# Patient Record
Sex: MALE | Race: BLACK OR AFRICAN AMERICAN | Employment: UNEMPLOYED | URBAN - METROPOLITAN AREA
[De-identification: names, ages, dates, MRNs, and addresses within clinical notes are randomized per-mention and may not be internally consistent; named-entity substitution may affect disease eponyms.]

---

## 2018-02-13 ENCOUNTER — APPOINTMENT (OUTPATIENT)
Dept: ULTRASOUND IMAGING | Age: 63
DRG: 853 | End: 2018-02-13
Attending: EMERGENCY MEDICINE
Payer: COMMERCIAL

## 2018-02-13 ENCOUNTER — HOSPITAL ENCOUNTER (INPATIENT)
Age: 63
LOS: 8 days | Discharge: NURSING, REHAB OR LONG-TERM CARE | DRG: 853 | End: 2018-02-21
Attending: EMERGENCY MEDICINE | Admitting: INTERNAL MEDICINE
Payer: COMMERCIAL

## 2018-02-13 ENCOUNTER — APPOINTMENT (OUTPATIENT)
Dept: GENERAL RADIOLOGY | Age: 63
DRG: 853 | End: 2018-02-13
Attending: EMERGENCY MEDICINE
Payer: COMMERCIAL

## 2018-02-13 ENCOUNTER — APPOINTMENT (OUTPATIENT)
Dept: ULTRASOUND IMAGING | Age: 63
DRG: 853 | End: 2018-02-13
Attending: INTERNAL MEDICINE
Payer: COMMERCIAL

## 2018-02-13 DIAGNOSIS — L03.116 CELLULITIS OF LEFT LEG: ICD-10-CM

## 2018-02-13 DIAGNOSIS — L02.416 ABSCESS OF LEFT LEG: ICD-10-CM

## 2018-02-13 DIAGNOSIS — M79.89 LEFT LEG SWELLING: Primary | ICD-10-CM

## 2018-02-13 DIAGNOSIS — R50.9 FEVER, UNSPECIFIED FEVER CAUSE: ICD-10-CM

## 2018-02-13 PROBLEM — D72.829 LEUKOCYTOSIS: Status: ACTIVE | Noted: 2018-02-13

## 2018-02-13 PROBLEM — E88.09 HYPOALBUMINEMIA: Status: ACTIVE | Noted: 2018-02-13

## 2018-02-13 PROBLEM — D75.839 THROMBOCYTOSIS: Status: ACTIVE | Noted: 2018-02-13

## 2018-02-13 PROBLEM — R74.8 ABNORMAL TRANSAMINASES: Status: ACTIVE | Noted: 2018-02-13

## 2018-02-13 PROBLEM — D64.9 ANEMIA: Status: ACTIVE | Noted: 2018-02-13

## 2018-02-13 LAB
ALBUMIN SERPL-MCNC: 2 G/DL (ref 3.2–4.6)
ALBUMIN/GLOB SERPL: 0.3 {RATIO} (ref 1.2–3.5)
ALP SERPL-CCNC: 212 U/L (ref 50–136)
ALT SERPL-CCNC: 141 U/L (ref 12–65)
ANION GAP SERPL CALC-SCNC: 8 MMOL/L (ref 7–16)
AST SERPL-CCNC: 174 U/L (ref 15–37)
ATRIAL RATE: 107 BPM
BASOPHILS # BLD: 0 K/UL (ref 0–0.2)
BASOPHILS NFR BLD: 0 % (ref 0–2)
BILIRUB SERPL-MCNC: 1.3 MG/DL (ref 0.2–1.1)
BNP SERPL-MCNC: 2 PG/ML
BUN SERPL-MCNC: 20 MG/DL (ref 8–23)
CALCIUM SERPL-MCNC: 9.7 MG/DL (ref 8.3–10.4)
CALCULATED P AXIS, ECG09: 65 DEGREES
CALCULATED R AXIS, ECG10: 49 DEGREES
CALCULATED T AXIS, ECG11: 54 DEGREES
CHLORIDE SERPL-SCNC: 102 MMOL/L (ref 98–107)
CO2 SERPL-SCNC: 27 MMOL/L (ref 21–32)
CREAT SERPL-MCNC: 1.17 MG/DL (ref 0.8–1.5)
DIAGNOSIS, 93000: NORMAL
DIFFERENTIAL METHOD BLD: ABNORMAL
EOSINOPHIL # BLD: 0.1 K/UL (ref 0–0.8)
EOSINOPHIL NFR BLD: 1 % (ref 0.5–7.8)
ERYTHROCYTE [DISTWIDTH] IN BLOOD BY AUTOMATED COUNT: 16 % (ref 11.9–14.6)
GLOBULIN SER CALC-MCNC: 6.1 G/DL (ref 2.3–3.5)
GLUCOSE SERPL-MCNC: 134 MG/DL (ref 65–100)
HCT VFR BLD AUTO: 27 % (ref 41.1–50.3)
HGB BLD-MCNC: 8.5 G/DL (ref 13.6–17.2)
IMM GRANULOCYTES # BLD: 0.2 K/UL (ref 0–0.5)
IMM GRANULOCYTES NFR BLD AUTO: 1 % (ref 0–5)
LYMPHOCYTES # BLD: 1.7 K/UL (ref 0.5–4.6)
LYMPHOCYTES NFR BLD: 13 % (ref 13–44)
MCH RBC QN AUTO: 24.7 PG (ref 26.1–32.9)
MCHC RBC AUTO-ENTMCNC: 31.5 G/DL (ref 31.4–35)
MCV RBC AUTO: 78.5 FL (ref 79.6–97.8)
MONOCYTES # BLD: 1.6 K/UL (ref 0.1–1.3)
MONOCYTES NFR BLD: 12 % (ref 4–12)
NEUTS SEG # BLD: 10 K/UL (ref 1.7–8.2)
NEUTS SEG NFR BLD: 73 % (ref 43–78)
P-R INTERVAL, ECG05: 164 MS
PLATELET # BLD AUTO: 743 K/UL (ref 150–450)
PMV BLD AUTO: 9.3 FL (ref 10.8–14.1)
POTASSIUM SERPL-SCNC: 4.3 MMOL/L (ref 3.5–5.1)
PROT SERPL-MCNC: 8.1 G/DL (ref 6.3–8.2)
Q-T INTERVAL, ECG07: 340 MS
QRS DURATION, ECG06: 80 MS
QTC CALCULATION (BEZET), ECG08: 453 MS
RBC # BLD AUTO: 3.44 M/UL (ref 4.23–5.67)
SODIUM SERPL-SCNC: 137 MMOL/L (ref 136–145)
TROPONIN I SERPL-MCNC: <0.04 NG/ML (ref 0.02–0.05)
VENTRICULAR RATE, ECG03: 107 BPM
WBC # BLD AUTO: 13.7 K/UL (ref 4.3–11.1)

## 2018-02-13 PROCEDURE — 65270000029 HC RM PRIVATE

## 2018-02-13 PROCEDURE — 96366 THER/PROPH/DIAG IV INF ADDON: CPT | Performed by: EMERGENCY MEDICINE

## 2018-02-13 PROCEDURE — 74011250637 HC RX REV CODE- 250/637: Performed by: INTERNAL MEDICINE

## 2018-02-13 PROCEDURE — 80053 COMPREHEN METABOLIC PANEL: CPT | Performed by: EMERGENCY MEDICINE

## 2018-02-13 PROCEDURE — 99283 EMERGENCY DEPT VISIT LOW MDM: CPT | Performed by: EMERGENCY MEDICINE

## 2018-02-13 PROCEDURE — 96367 TX/PROPH/DG ADDL SEQ IV INF: CPT | Performed by: EMERGENCY MEDICINE

## 2018-02-13 PROCEDURE — 87186 SC STD MICRODIL/AGAR DIL: CPT | Performed by: EMERGENCY MEDICINE

## 2018-02-13 PROCEDURE — 87641 MR-STAPH DNA AMP PROBE: CPT | Performed by: EMERGENCY MEDICINE

## 2018-02-13 PROCEDURE — 93971 EXTREMITY STUDY: CPT

## 2018-02-13 PROCEDURE — 87205 SMEAR GRAM STAIN: CPT | Performed by: EMERGENCY MEDICINE

## 2018-02-13 PROCEDURE — 83880 ASSAY OF NATRIURETIC PEPTIDE: CPT | Performed by: EMERGENCY MEDICINE

## 2018-02-13 PROCEDURE — 74011250636 HC RX REV CODE- 250/636: Performed by: INTERNAL MEDICINE

## 2018-02-13 PROCEDURE — 99218 HC RM OBSERVATION: CPT

## 2018-02-13 PROCEDURE — 87040 BLOOD CULTURE FOR BACTERIA: CPT | Performed by: EMERGENCY MEDICINE

## 2018-02-13 PROCEDURE — 74011000258 HC RX REV CODE- 258: Performed by: EMERGENCY MEDICINE

## 2018-02-13 PROCEDURE — 85025 COMPLETE CBC W/AUTO DIFF WBC: CPT | Performed by: EMERGENCY MEDICINE

## 2018-02-13 PROCEDURE — 71046 X-RAY EXAM CHEST 2 VIEWS: CPT

## 2018-02-13 PROCEDURE — 96372 THER/PROPH/DIAG INJ SC/IM: CPT | Performed by: EMERGENCY MEDICINE

## 2018-02-13 PROCEDURE — 84484 ASSAY OF TROPONIN QUANT: CPT | Performed by: EMERGENCY MEDICINE

## 2018-02-13 PROCEDURE — 96365 THER/PROPH/DIAG IV INF INIT: CPT | Performed by: EMERGENCY MEDICINE

## 2018-02-13 PROCEDURE — 93005 ELECTROCARDIOGRAM TRACING: CPT | Performed by: EMERGENCY MEDICINE

## 2018-02-13 PROCEDURE — 99284 EMERGENCY DEPT VISIT MOD MDM: CPT | Performed by: EMERGENCY MEDICINE

## 2018-02-13 PROCEDURE — 74011250636 HC RX REV CODE- 250/636: Performed by: EMERGENCY MEDICINE

## 2018-02-13 PROCEDURE — 87077 CULTURE AEROBIC IDENTIFY: CPT | Performed by: EMERGENCY MEDICINE

## 2018-02-13 RX ORDER — ACETAMINOPHEN AND CODEINE PHOSPHATE 300; 30 MG/1; MG/1
1 TABLET ORAL
COMMUNITY

## 2018-02-13 RX ORDER — CYCLOBENZAPRINE HCL 5 MG
5 TABLET ORAL
COMMUNITY

## 2018-02-13 RX ORDER — ACETAMINOPHEN 500 MG
1000 TABLET ORAL
Status: DISCONTINUED | OUTPATIENT
Start: 2018-02-13 | End: 2018-02-13

## 2018-02-13 RX ORDER — GABAPENTIN 300 MG/1
300 CAPSULE ORAL 3 TIMES DAILY
COMMUNITY
End: 2018-03-07

## 2018-02-13 RX ORDER — HEPARIN SODIUM 5000 [USP'U]/ML
5000 INJECTION, SOLUTION INTRAVENOUS; SUBCUTANEOUS EVERY 8 HOURS
Status: DISCONTINUED | OUTPATIENT
Start: 2018-02-13 | End: 2018-02-21 | Stop reason: HOSPADM

## 2018-02-13 RX ORDER — TRAMADOL HYDROCHLORIDE 50 MG/1
50 TABLET ORAL
Status: DISCONTINUED | OUTPATIENT
Start: 2018-02-13 | End: 2018-02-21 | Stop reason: HOSPADM

## 2018-02-13 RX ORDER — VANCOMYCIN 2 GRAM/500 ML IN 0.9 % SODIUM CHLORIDE INTRAVENOUS
2000 EVERY 12 HOURS
Status: DISCONTINUED | OUTPATIENT
Start: 2018-02-13 | End: 2018-02-15 | Stop reason: DRUGHIGH

## 2018-02-13 RX ORDER — ACETAMINOPHEN 325 MG/1
650 TABLET ORAL
Status: DISCONTINUED | OUTPATIENT
Start: 2018-02-13 | End: 2018-02-21 | Stop reason: HOSPADM

## 2018-02-13 RX ADMIN — HEPARIN SODIUM 5000 UNITS: 5000 INJECTION, SOLUTION INTRAVENOUS; SUBCUTANEOUS at 20:11

## 2018-02-13 RX ADMIN — SODIUM CHLORIDE 1000 ML: 900 INJECTION, SOLUTION INTRAVENOUS at 16:27

## 2018-02-13 RX ADMIN — PIPERACILLIN SODIUM,TAZOBACTAM SODIUM 4.5 G: 4; .5 INJECTION, POWDER, FOR SOLUTION INTRAVENOUS at 16:28

## 2018-02-13 RX ADMIN — TRAMADOL HYDROCHLORIDE 50 MG: 50 TABLET, FILM COATED ORAL at 22:31

## 2018-02-13 RX ADMIN — VANCOMYCIN HYDROCHLORIDE 2000 MG: 10 INJECTION, POWDER, LYOPHILIZED, FOR SOLUTION INTRAVENOUS at 17:02

## 2018-02-13 NOTE — ED TRIAGE NOTES
Patient traveled by bus 12 hours on 2/8/2018 and started swelling to left leg on 2/9/2018. Patient advises pain is worse to knee area, denies any known trauma.

## 2018-02-13 NOTE — IP AVS SNAPSHOT
[place] 
 
 
 [id number] [facility] 
[phone] Patient: Briana Seymour MRN: [id number] VBD:6/36/1518 A check vince indicates which time of day the medication should be taken. My Medications ASK your doctor about these medications Instructions Each Dose to Equal  
 Morning Noon Evening Bedtime  
 cyclobenzaprine 5 mg tablet Commonly known as:  FLEXERIL Your last dose was: Your next dose is: Take 5 mg by mouth. 5 mg  
    
   
   
   
  
 gabapentin 300 mg capsule Commonly known as:  NEURONTIN Your last dose was: Your next dose is: Take 300 mg by mouth three (3) times daily. 300 mg  
    
   
   
   
  
 TYLENOL-CODEINE #3 300-30 mg per tablet Generic drug:  acetaminophen-codeine Your last dose was: Your next dose is: Take 1 Tab by mouth every four (4) hours as needed for Pain. 1 Tab

## 2018-02-13 NOTE — H&P
History and Physical    Patient: Herman Mast MRN: [id number]  SSN: [id number]    YOB: 1955  Age: 57 y.o. Sex: male      Subjective:   Cc: \" my left leg hurts\"    Herman Mast is a 57 y.o. male who has no significant PMH comes today after he experienced left leg pain over 1-2 weeks, that turned edematous 4 days ago. Of note, he lives in [place] and came to visit family members to [place], [place] on 2/9/18 via Bus. He denies trauma, falls, scratches over that leg. He never experienced episodes of cellulitis in the past. Denies fever, chills, general malaise, chest pain, cough, abdominal pain, diarrhea, dysuria, melena, GI bleed or any other symptoms. He has been fasting for 3 weeks due to Confucianist beliefs. He last visited his outside PCP on April/17, and he was told everything was normal.   Upon arrival to er vs /74    RR  16  T100.9F . Labs: wbc 13k, hb 8.5, plat 750k, normal bnp, negative troponin. Duplex of LLE was unremarkable. He was given vanc and zosyn iv. EKG: showed sinus tachycardia, qtc 453, cxr unremarkable. BC x 2 taken. Hospitalist was contacted for admission of this unfortunate patient with a diagnosis of LLE cellulitis. ROS: all pertinent findings described in my note    PMH: as above  Social Hx: non smoker, no alcohol, no drugs  Family hx: his father has ESRD on HD    History reviewed. No pertinent past medical history. Past Surgical History:   Procedure Laterality Date    HX ORTHOPAEDIC      back      History reviewed. No pertinent family history. Social History   Substance Use Topics    Smoking status: Never Smoker    Smokeless tobacco: Never Used    Alcohol use No      Prior to Admission medications    Medication Sig Start Date End Date Taking? Authorizing Provider   acetaminophen-codeine (TYLENOL-CODEINE #3) 300-30 mg per tablet Take 1 Tab by mouth every four (4) hours as needed for Pain.    Yes Phys Other, MD   cyclobenzaprine (FLEXERIL) 5 mg tablet Take 5 mg by mouth. Yes Sylvia Sifuentes MD   gabapentin (NEURONTIN) 300 mg capsule Take 300 mg by mouth three (3) times daily. Yes Sylvia Sifuentes MD        No Known Allergies    Review of Systems:  A comprehensive review of systems was negative except for that written in the History of Present Illness. Objective:     Vitals:    02/13/18 1335   BP: 128/74   Pulse: (!) 120   Resp: 16   Temp: (!) 100.9 °F (38.3 °C)   SpO2: 96%   Weight: 113.4 kg (250 lb)   Height: 5' 8\" (1.727 m)        Physical Exam:  GENERAL: alert, cooperative, no distress, appears stated age  EYE: negative  LYMPHATIC: Cervical, supraclavicular, and axillary nodes normal.   THROAT & NECK: normal and no erythema or exudates noted. LUNG: clear to auscultation bilaterally  HEART: regular rate and rhythm, S1, S2 normal, no murmur, click, rub or gallop  ABDOMEN: distended, soft, non-tender. Bowel sounds normal. No masses,  no organomegaly  EXTREMITIES:  LLE edematous, shiny, mild tender to touch, no skinbreakdown, no ulcers, mild erythema from below the knee to foot   SKIN: chronic skin changes, hyperpigmentation   NEUROLOGIC: negative  PSYCHIATRIC: non focal    Assessment:     Hospital Problems  Date Reviewed: 2/13/2018          Codes Class Noted POA    Leukocytosis ICD-10-CM: D72.829  ICD-9-CM: 288.60  2/13/2018 Yes        Hypoalbuminemia ICD-10-CM: E88.09  ICD-9-CM: 273.8  2/13/2018 Yes        Anemia ICD-10-CM: D64.9  ICD-9-CM: 285.9  2/13/2018 Yes        Left leg cellulitis ICD-10-CM: L03.116  ICD-9-CM: 682.6  2/13/2018 Yes        Abnormal transaminases ICD-10-CM: R74.8  ICD-9-CM: 790.4  2/13/2018 Yes        Thrombocytosis ([address] ICD-10-CM: D47.3  ICD-9-CM: 238.71  2/13/2018 Yes              Plan: 1. Non purulent- Left leg cellulitis: possible superimposed to venous insufficiency   2. Leukocytosis: multifactorial, possible reactive  3. Thrombocytosis: possible to stress / infection  4. Hypoalbuminemia: he has been fasting for around 3 weeks due to Yarsani beliefs / also liver disease to be ruled out in light of elevated lfts  5. Transaminitis and elevated alk phosphatase   6. Anemia: unknown if SEGUNDO / chronic anemia  7. Obesity    Plan:  -Admit under observation  -Continue vancomycin, stop zosyn  -pain control: tylenol, ultram  -iron studies for anemia w/u  -Liver US for hypoalbuminemia  -Send PT/INR, hepatitis panel in am  -Keep left leg elevated  -F/U blood cultures   -Labs in am  -DVT ppx: heparin, GCS over right leg    Full code    Estimated LOS < 2 MN  Estimated DC planning: home  Risk: moderate  Goals of care discussed with patient and family member present in the room     Signed By: Faiza Diaz MD     February 13, 2018

## 2018-02-13 NOTE — IP AVS SNAPSHOT
Summary of Care Report The Summary of Care report has been created to help improve care coordination. Users with access to [employer] or [employer] (Web-based application) may access additional patient information including the Discharge Summary. If you are not currently a [address] user and need more information, please call the number listed below in the [place] [address] section and ask to be connected with Medical Records. Facility Information Name Address Phone [address] [place] [address] [address] [place] [phone] Patient Information Patient Name Sex [place] Gutierrez ([id number]) Male 1955 Discharge Information Admitting Provider Service Area Unit Mariely Lorenz MD / [place]  Med Surg / [phone] Discharge Provider Discharge Date/Time Discharge Disposition Destination (none) 2018 (Pending) [facility] (none) Hospital Problems as of 2018  Reviewed: 2018  4:31 PM by Mariely Lorenz MD  
  
  
  
 Class Noted - Resolved Last Modified POA Active Problems Leukocytosis  2018 - Present 2018 by Mariely Lorenz MD Yes Entered by Mariely Lorenz MD  
  Hypoalbuminemia  2018 - Present 2018 by Mariely Lorenz MD Yes Entered by Mariely Lorenz MD  
  Anemia  2018 - Present 2018 by Mariely Lorenz MD Yes Entered by Mariely Lorenz MD  
  Left leg cellulitis  2018 - Present 2018 by Mariely Lorenz MD Yes Entered by Mariely Lorenz MD  
  Abnormal transaminases  2018 - Present 2018 by Mariely Lorenz MD Yes Entered by Mariely Lorenz MD  
  Thrombocytosis ([address]  2018 - Present 2018 by Mariely Lorenz MD Yes Entered by Mariely Lorenz MD  
  Cellulitis  2/15/2018 - Present 2/15/2018 by Mariely Lorenz MD Unknown Entered by Dilia Noonan MD  
  
Non-Hospital Problems as of 2/20/2018  Reviewed: 2/13/2018  4:31 PM by Dilia Noonan MD  
 None You are allergic to the following No active allergies Current Discharge Medication List  
  
ASK your doctor about these medications Dose & Instructions Dispensing Information Comments  
 cyclobenzaprine 5 mg tablet Commonly known as:  FLEXERIL Dose:  5 mg Take 5 mg by mouth. Refills:  0  
   
 gabapentin 300 mg capsule Commonly known as:  NEURONTIN Dose:  300 mg Take 300 mg by mouth three (3) times daily. Refills:  0  
   
 TYLENOL-CODEINE #3 300-30 mg per tablet Generic drug:  acetaminophen-codeine Dose:  1 Tab Take 1 Tab by mouth every four (4) hours as needed for Pain. Refills:  0 Current Immunizations Name Date  
 TB Skin Test (PPD) Intradermal  Deferred (), 2/18/2018 Surgery Information ID Date/Time Status Primary Surgeon All Procedures Location [id number] 2/17/2018 [facility] Petra Rodrigues MD INCISION AND DRAINAGE LOWER EXTREMITY [facility] MAIN OR    
 [id number] 2/21/2018 [address] [place] [address] Street, MD LEFT LEG  WOUND CLOSURE   ROOM 356 [facility] MAIN OR Follow-up Information Follow up With Details Comments Contact Info None   None (395) Patient stated that they have no PCP Discharge Instructions None Chart Review Routing History Recipient Method Report Sent By Mary Cervantes MD  
Fax: [phone] Phone: [phone] Fax IP Auto Routed MD Nidhi Slater 2/16/2018 10:16 AM 02/16/2018

## 2018-02-13 NOTE — IP AVS SNAPSHOT
[facility] [address] [facility] 
[phone] Patient: Colleen Bhatt MRN: [id number] EWQ:7/46/6771 About your hospitalization You were admitted on:  February 13, 2018 You last received care in the:  [address] [place] [address] You were discharged on:  February 20, 2018 Why you were hospitalized Your primary diagnosis was:  Not on File Your diagnoses also included:  Leukocytosis, Hypoalbuminemia, Anemia, Left Leg Cellulitis, Abnormal Transaminases, Thrombocytosis (Hcc), Cellulitis Follow-up Information Follow up With Details Comments Contact Info None   None (395) Patient stated that they have no PCP Your Scheduled Appointments Wednesday February 21, 2018 EXTREMITY IRRIGATION AND DEBRIDEMENT with Velma Bamberger, MD  
D SURGERY ([address] [place] [address]) [address] [place] [address] [facility]  
[phone] Discharge Orders None A check vince indicates which time of day the medication should be taken. My Medications ASK your doctor about these medications Instructions Each Dose to Equal  
 Morning Noon Evening Bedtime  
 cyclobenzaprine 5 mg tablet Commonly known as:  FLEXERIL Your last dose was: Your next dose is: Take 5 mg by mouth. 5 mg  
    
   
   
   
  
 gabapentin 300 mg capsule Commonly known as:  NEURONTIN Your last dose was: Your next dose is: Take 300 mg by mouth three (3) times daily. 300 mg  
    
   
   
   
  
 TYLENOL-CODEINE #3 300-30 mg per tablet Generic drug:  acetaminophen-codeine Your last dose was: Your next dose is: Take 1 Tab by mouth every four (4) hours as needed for Pain. 1 Tab Discharge Instructions None MyChart Announcement  We are excited to announce that we are making your provider's discharge notes available to you in [employer]. You will see these notes when they are completed and signed by the physician that discharged you from your recent hospital stay. If you have any questions or concerns about any information you see in [employer], please call the Health Information Department where you were seen or reach out to your Primary Care Provider for more information about your plan of care. Introducing [facility]! [facility] introduces [employer] patient portal. Now you can access parts of your medical record, email your doctor's office, and request medication refills online. 1. In your internet browser, go to [email or web address] 2. Click on the First Time User? Click Here link in the Sign In box. You will see the New Member Sign Up page. 3. Enter your [employer] Access Code exactly as it appears below. You will not need to use this code after youve completed the sign-up process. If you do not sign up before the expiration date, you must request a new code. · Man[employer] Access Code: [email or web address] Expires: 5/14/2018  1:31 PM 
 
4. Enter the last four digits of your Social Security Number (xxxx) and Date of Birth (mm/dd/yyyy) as indicated and click Submit. You will be taken to the next sign-up page. 5. Create a Man[employer] Systems ID. This will be your Man[employer] Systems login ID and cannot be changed, so think of one that is secure and easy to remember. 6. Create a [employer] password. You can change your password at any time. 7. Enter your Password Reset Question and Answer. This can be used at a later time if you forget your password. 8. Enter your e-mail address. You will receive e-mail notification when new information is available in [address] 9. Click Sign Up. You can now view and download portions of your medical record. 10. Click the Download Summary menu link to download a portable copy of your medical information. If you have questions, please visit the Frequently Asked Questions section of the MyChart website. Remember, MyChart is NOT to be used for urgent needs. For medical emergencies, dial 911. Now available from your iPhone and Android! Unresulted Labs-Please follow up with your PCP about these lab tests Order Current Status CULTURE, ANAEROBIC Preliminary result CULTURE, ANAEROBIC Preliminary result CULTURE, ANAEROBIC Preliminary result CULTURE, ANAEROBIC Preliminary result CULTURE, ANAEROBIC Preliminary result CULTURE, BLOOD Preliminary result CULTURE, BLOOD Preliminary result Providers Seen During Your Hospitalization Provider Specialty Primary office phone Nidhi Orozco MD Emergency Medicine [phone] Augusto Latif MD Internal Medicine [phone] Immunizations Administered for This Admission Name Date  
 TB Skin Test (PPD) Intradermal  Deferred (), 2/18/2018 Your Primary Care Physician (PCP) Primary Care Physician Office Phone Office Fax NONE ** None ** ** None ** You are allergic to the following No active allergies Recent Documentation Height Weight BMI Smoking Status 1.727 m 113.4 kg 38.01 kg/m2 Never Smoker Patient Belongings The following personal items are in your possession at time of discharge: 
  Dental Appliances: Other (comment) (crown glued in.)  Visual Aid: Glasses      Home Medications: None   Jewelry: Watch, With patient  Clothing: At bedside, Footwear, Jacket/Coat, Shirt, Socks, Pants, Undergarments    Other Valuables: None Discharge Instructions Attachments/References ECHOCARDIOGRAM: TRANSESOPHAGEAL: POST-OP (ENGLISH) Patient Handouts Transesophageal Echocardiogram: What to Expect at [facility] Your Recovery A transesophageal echocardiogram is a test to help your doctor look at the inside of your heart.  A small device called a transducer directs sound waves toward your heart. The sound waves make a picture of the heart's valves and chambers. Before the test, your throat was sprayed with medicine to numb it. You won't be able to eat or drink until the numbness wears off. Your throat may be sore for a few days. You may have had a sedative to help you relax. You may be unsteady after having sedation. It can take a few hours for the medicine's effects to wear off. Common side effects include nausea, vomiting, and feeling sleepy or tired. This care sheet gives you a general idea about how long it will take for you to recover. But each person recovers at a different pace. Follow the steps below to feel better as quickly as possible. How can you care for yourself at home? Activity ? · If a sedative was used, your doctor will tell you when it is safe for you to do your normal activities. ? · For your safety, do not drive or operate any machinery that could be dangerous. Wait until the medicine wears off and you can think clearly and react easily. Diet ? · You can eat your normal diet. Follow-up care is a key part of your treatment and safety. Be sure to make and go to all appointments, and call your doctor if you are having problems. It's also a good idea to know your test results and keep a list of the medicines you take. When should you call for help? Watch closely for changes in your health, and be sure to contact your doctor if you have any problems. Where can you learn more? Go to http://rufus-caitlyn.info/. Enter M068 in the search box to learn more about \"Transesophageal Echocardiogram: What to Expect at Home. \" Current as of: September 21, 2016 Content Version: 11.4 © 3224-6989 [employer]. Care instructions adapted under license by [employer] (which disclaims liability or warranty for this information).  If you have questions about a medical condition or this instruction, always ask your healthcare professional. [address] any warranty or liability for your use of this information. Please provide this summary of care documentation to your next provider. Signatures-by signing, you are acknowledging that this After Visit Summary has been reviewed with you and you have received a copy. Patient Signature:  ____________________________________________________________ Date:  ____________________________________________________________  
  
Niesha Goodricher Provider Signature:  ____________________________________________________________ Date:  ____________________________________________________________

## 2018-02-14 ENCOUNTER — APPOINTMENT (OUTPATIENT)
Dept: ULTRASOUND IMAGING | Age: 63
DRG: 853 | End: 2018-02-14
Attending: INTERNAL MEDICINE
Payer: COMMERCIAL

## 2018-02-14 LAB
ALBUMIN SERPL-MCNC: 1.7 G/DL (ref 3.2–4.6)
ALBUMIN/GLOB SERPL: 0.3 {RATIO} (ref 1.2–3.5)
ALP SERPL-CCNC: 198 U/L (ref 50–136)
ALT SERPL-CCNC: 133 U/L (ref 12–65)
ANION GAP SERPL CALC-SCNC: 9 MMOL/L (ref 7–16)
AST SERPL-CCNC: 133 U/L (ref 15–37)
BILIRUB DIRECT SERPL-MCNC: 0.5 MG/DL
BILIRUB SERPL-MCNC: 0.9 MG/DL (ref 0.2–1.1)
BUN SERPL-MCNC: 15 MG/DL (ref 8–23)
CALCIUM SERPL-MCNC: 9.2 MG/DL (ref 8.3–10.4)
CHLORIDE SERPL-SCNC: 102 MMOL/L (ref 98–107)
CO2 SERPL-SCNC: 25 MMOL/L (ref 21–32)
CREAT SERPL-MCNC: 0.81 MG/DL (ref 0.8–1.5)
FERRITIN SERPL-MCNC: 501 NG/ML (ref 8–388)
GLOBULIN SER CALC-MCNC: 5.1 G/DL (ref 2.3–3.5)
GLUCOSE SERPL-MCNC: 99 MG/DL (ref 65–100)
INR PPP: 1.2
IRON SATN MFR SERPL: 10 %
IRON SERPL-MCNC: 14 UG/DL (ref 35–150)
POTASSIUM SERPL-SCNC: 4.5 MMOL/L (ref 3.5–5.1)
PROT SERPL-MCNC: 6.8 G/DL (ref 6.3–8.2)
PROTHROMBIN TIME: 15.6 SEC (ref 11.5–14.5)
SODIUM SERPL-SCNC: 136 MMOL/L (ref 136–145)
TIBC SERPL-MCNC: 138 UG/DL (ref 250–450)

## 2018-02-14 PROCEDURE — 85610 PROTHROMBIN TIME: CPT | Performed by: INTERNAL MEDICINE

## 2018-02-14 PROCEDURE — 82728 ASSAY OF FERRITIN: CPT | Performed by: INTERNAL MEDICINE

## 2018-02-14 PROCEDURE — 76705 ECHO EXAM OF ABDOMEN: CPT

## 2018-02-14 PROCEDURE — 85025 COMPLETE CBC W/AUTO DIFF WBC: CPT | Performed by: INTERNAL MEDICINE

## 2018-02-14 PROCEDURE — 74011250637 HC RX REV CODE- 250/637: Performed by: INTERNAL MEDICINE

## 2018-02-14 PROCEDURE — 93306 TTE W/DOPPLER COMPLETE: CPT

## 2018-02-14 PROCEDURE — 36415 COLL VENOUS BLD VENIPUNCTURE: CPT | Performed by: INTERNAL MEDICINE

## 2018-02-14 PROCEDURE — 87205 SMEAR GRAM STAIN: CPT | Performed by: INTERNAL MEDICINE

## 2018-02-14 PROCEDURE — P9045 ALBUMIN (HUMAN), 5%, 250 ML: HCPCS | Performed by: INTERNAL MEDICINE

## 2018-02-14 PROCEDURE — 99218 HC RM OBSERVATION: CPT

## 2018-02-14 PROCEDURE — 80074 ACUTE HEPATITIS PANEL: CPT | Performed by: INTERNAL MEDICINE

## 2018-02-14 PROCEDURE — 74011250636 HC RX REV CODE- 250/636: Performed by: INTERNAL MEDICINE

## 2018-02-14 PROCEDURE — 87040 BLOOD CULTURE FOR BACTERIA: CPT | Performed by: INTERNAL MEDICINE

## 2018-02-14 PROCEDURE — 80048 BASIC METABOLIC PNL TOTAL CA: CPT | Performed by: INTERNAL MEDICINE

## 2018-02-14 PROCEDURE — 83540 ASSAY OF IRON: CPT | Performed by: INTERNAL MEDICINE

## 2018-02-14 PROCEDURE — 80076 HEPATIC FUNCTION PANEL: CPT | Performed by: INTERNAL MEDICINE

## 2018-02-14 PROCEDURE — 74011250636 HC RX REV CODE- 250/636: Performed by: EMERGENCY MEDICINE

## 2018-02-14 PROCEDURE — 65270000029 HC RM PRIVATE

## 2018-02-14 RX ORDER — HYDROCODONE BITARTRATE AND ACETAMINOPHEN 10; 325 MG/1; MG/1
1 TABLET ORAL ONCE
Status: COMPLETED | OUTPATIENT
Start: 2018-02-14 | End: 2018-02-14

## 2018-02-14 RX ORDER — FUROSEMIDE 10 MG/ML
20 INJECTION INTRAMUSCULAR; INTRAVENOUS DAILY
Status: DISCONTINUED | OUTPATIENT
Start: 2018-02-14 | End: 2018-02-16

## 2018-02-14 RX ORDER — ALBUMIN HUMAN 50 G/1000ML
25 SOLUTION INTRAVENOUS EVERY 12 HOURS
Status: COMPLETED | OUTPATIENT
Start: 2018-02-14 | End: 2018-02-17

## 2018-02-14 RX ORDER — CYCLOBENZAPRINE HCL 10 MG
10 TABLET ORAL ONCE
Status: COMPLETED | OUTPATIENT
Start: 2018-02-14 | End: 2018-02-14

## 2018-02-14 RX ORDER — SODIUM CHLORIDE 9 MG/ML
100 INJECTION, SOLUTION INTRAVENOUS ONCE
Status: COMPLETED | OUTPATIENT
Start: 2018-02-14 | End: 2018-02-17

## 2018-02-14 RX ADMIN — ALBUMIN (HUMAN) 25 G: 12.5 INJECTION, SOLUTION INTRAVENOUS at 23:52

## 2018-02-14 RX ADMIN — VANCOMYCIN HYDROCHLORIDE 2000 MG: 10 INJECTION, POWDER, LYOPHILIZED, FOR SOLUTION INTRAVENOUS at 16:18

## 2018-02-14 RX ADMIN — HEPARIN SODIUM 5000 UNITS: 5000 INJECTION, SOLUTION INTRAVENOUS; SUBCUTANEOUS at 18:00

## 2018-02-14 RX ADMIN — HEPARIN SODIUM 5000 UNITS: 5000 INJECTION, SOLUTION INTRAVENOUS; SUBCUTANEOUS at 12:04

## 2018-02-14 RX ADMIN — TRAMADOL HYDROCHLORIDE 50 MG: 50 TABLET, FILM COATED ORAL at 12:04

## 2018-02-14 RX ADMIN — VANCOMYCIN HYDROCHLORIDE 2000 MG: 10 INJECTION, POWDER, LYOPHILIZED, FOR SOLUTION INTRAVENOUS at 04:23

## 2018-02-14 RX ADMIN — FUROSEMIDE 20 MG: 10 INJECTION, SOLUTION INTRAMUSCULAR; INTRAVENOUS at 12:05

## 2018-02-14 RX ADMIN — ALBUMIN (HUMAN) 25 G: 12.5 INJECTION, SOLUTION INTRAVENOUS at 12:39

## 2018-02-14 RX ADMIN — HEPARIN SODIUM 5000 UNITS: 5000 INJECTION, SOLUTION INTRAVENOUS; SUBCUTANEOUS at 04:23

## 2018-02-14 RX ADMIN — TRAMADOL HYDROCHLORIDE 50 MG: 50 TABLET, FILM COATED ORAL at 04:23

## 2018-02-14 RX ADMIN — TRAMADOL HYDROCHLORIDE 50 MG: 50 TABLET, FILM COATED ORAL at 18:00

## 2018-02-14 RX ADMIN — Medication: at 12:39

## 2018-02-14 RX ADMIN — ACETAMINOPHEN 650 MG: 325 TABLET ORAL at 08:47

## 2018-02-14 RX ADMIN — ACETAMINOPHEN 650 MG: 325 TABLET ORAL at 00:16

## 2018-02-14 RX ADMIN — CYCLOBENZAPRINE HYDROCHLORIDE 10 MG: 10 TABLET, FILM COATED ORAL at 00:16

## 2018-02-14 RX ADMIN — HYDROCODONE BITARTRATE AND ACETAMINOPHEN 1 TABLET: 10; 325 TABLET ORAL at 00:16

## 2018-02-14 NOTE — PROGRESS NOTES
Breanna Sherman is a 57 y.o M admitted for LLE Cellulitis. Arrived via stretcher with daughter and sister at bedside. Pt stated he was traveling from [place] for a visit here. Pt stated BLE swelling began last Friday, but since then has worsened. Patient is A&Ox4. Able to verbalize needs. Resting quietly with no distress noted. Admission assessment completed. Neurovascular and peripheral vascular checks WNL. LLE elevated on 3 pillows. Voiding clear yellow urine via urinal. Ambulates with 1 assist. PIV capped and flushed without difficulty. Dressing to PIV intact with no S/S of infection. Requesting stronger pain meds. Will page hosp about request. Bed low and locked. Call light within reach. Instructed to call for assistance. Patient verbalizes understanding. Will continue to monitor.

## 2018-02-14 NOTE — PROGRESS NOTES
Pharmacokinetic Consult to Pharmacist    Joce Joshua is a 57 y.o. male being treated for cellulitis with Vancomycin. Height: 5' 8\" (172.7 cm)  Weight: 113.4 kg (250 lb)  Lab Results   Component Value Date/Time    BUN 15 02/14/2018 07:07 AM    Creatinine 0.81 02/14/2018 07:07 AM    WBC 14.6 (H) 02/14/2018 07:07 AM      Estimated Creatinine Clearance: 112.6 mL/min (based on Cr of 0.81). CULTURES:  All Micro Results     Procedure Component Value Units Date/Time    CULTURE, BLOOD [[id number]] Collected:  02/13/18 1626    Order Status:  Completed Specimen:  Blood from Blood Updated:  02/14/18 1037     Special Requests: --        RIGHT  Antecubital       GRAM STAIN GRAM POSITIVE COCCI                 AEROBIC AND ANAEROBIC BOTTLES              RESULTS VERIFIED, PHONED TO AND READ BACK BY [address] [place] [address] RN @3926 2/14/18 BY      Culture result:         CULTURE IN PROGRESS,FURTHER UPDATES TO FOLLOW    CULTURE, BLOOD [[id number]] Collected:  02/13/18 1638    Order Status:  Completed Specimen:  Blood from Blood Updated:  02/14/18 0733     Special Requests: --        LEFT  HAND       Culture result: NO GROWTH AFTER 14 HOURS               No results found for: [place]    Day 2 of vancomycin. Goal trough is 12 - 18. Will continue with 2 grams IV every 12 hours. Will continue to follow patient.       Thank you,  Jaye MillerD, BCPS

## 2018-02-14 NOTE — PROGRESS NOTES
TRANSFER - IN REPORT:    Verbal report received from Sarah Hidalgo on Keren Habermann  being received from ED for routine progression of care      Report consisted of patients Situation, Background, Assessment and   Recommendations(SBAR). Information from the following report(s) SBAR, ED Summary, Intake/Output and MAR was reviewed with the receiving nurse. Opportunity for questions and clarification was provided. Assessment completed upon patients arrival to unit and care assumed.

## 2018-02-14 NOTE — PROGRESS NOTES
Shift assessment complete, see flow sheet for full assessment. Pt alert and oriented x4, denies pain at this time. Respirations even and unlabored breath sounds clear bilaterally. S1 S2 auscultated, VSS. Skin warm, dry. +3 edema in LLE. Abdomen soft and non tender, bowel sounds active in all quadrants. Bed low and locked, call light within reach, pt advised to call if assistance is needed.

## 2018-02-14 NOTE — PROGRESS NOTES
Progress Note    Patient: Milan Garcia MRN: [id number]  SSN: [id number]    YOB: 1955  Age: 57 y.o. Sex: male      Admit Date: 2/13/2018    LOS: 0 days     Subjective:   Patient examined at bedside. He was found in no distress, stated feeling better. Had no fever, no vomiting, no abdominal pain nor diarrhea. Blood culture was positive for MRSA. Currently he remains on vancomycin. ECHO ordered. Awaiting complete results from blood cultures. ROS: all pertinent findings described in my note. Objective:     Vitals:    02/13/18 2130 02/13/18 2230 02/13/18 2302 02/14/18 0400   BP: 148/80 (!) 155/98 (!) 154/94 146/88   Pulse: (!) 112 (!) 113 (!) 110 (!) 107   Resp: 24 21 20 20   Temp:  99.2 °F (37.3 °C) 98.4 °F (36.9 °C) 96.1 °F (35.6 °C)   SpO2: 97% 99% 99% 98%   Weight:       Height:            Intake and Output:  Current Shift:    Last three shifts: 02/12 1901 - 02/14 0700  In: -   Out: 700 [Urine:700]    Physical Exam:   GENERAL: alert, cooperative, no distress, appears stated age  EYE: negative  LYMPHATIC: Cervical, supraclavicular, and axillary nodes normal.   THROAT & NECK: normal and no erythema or exudates noted. LUNG: clear to auscultation bilaterally  HEART: regular rate and rhythm, S1, S2 normal, no murmur, click, rub or gallop  ABDOMEN: soft, non-tender. Bowel sounds normal. No masses,  no organomegaly  EXTREMITIES:  LLE: edema, non tender, mild erythema, right leg: atraumatic, no cyanosis or edema  SKIN: Normal.  NEUROLOGIC: negative  Vascular: pulses present   PSYCHIATRIC: non focal    Lab/Data Review: All lab results for the last 24 hours reviewed. Assessment:     Active Problems:    Leukocytosis (2/13/2018)      Hypoalbuminemia (2/13/2018)      Anemia (2/13/2018)      Left leg cellulitis (2/13/2018)      Abnormal transaminases (2/13/2018)      Thrombocytosis ([address] (2/13/2018)        Plan: 1. Non purulent- Left leg cellulitis: possible superimposed to venous insufficiency: continue vancomycin- keep leg elevated - f/u blood cultures, so far growing MRSA on 1/2 bottle. Repeated cultures sent- ECHO    2.Leukocytosis: multifactorial, possible due to infection - monitor     3. Thrombocytosis: possible to stress / infection- monitor daily     4. Hypoalbuminemia: he has been fasting for around 3 weeks due to Christian beliefs / also liver disease to be ruled out in light of elevated lfts: start albumin iv and alternate with lasix for 2 days     5. Transaminitis and elevated alk phosphatase: stable, liver US was normal- f/u hepatitis panel     6. Anemia: unknown if SEGUNDO / chronic anemia: low iron stores- may need iron transfusion once his infection is clear     7. Obesity: counseling      -DVT ppx: heparin, GCS over right leg     Full code    Signed By: Nat Tuhrman MD     February 14, 2018

## 2018-02-14 NOTE — ED NOTES
Received bedside report from [address] [place] [address]. Left leg elevated on three pillows.

## 2018-02-14 NOTE — ED NOTES
TRANSFER - OUT REPORT:    Verbal report given to [facility]) on [facility]  being transferred to [id number]) for routine progression of care       Report consisted of patients Situation, Background, Assessment and   Recommendations(SBAR). Information from the following report(s) ED Summary was reviewed with the receiving nurse. Lines:       Opportunity for questions and clarification was provided.       Patient transported with:   Tech, family with pt

## 2018-02-14 NOTE — PROGRESS NOTES
Spoke with Dr. Abiola Li about pts request for more pain meds and received an order to give a one time dose of Norco 10mg and to let the morning rounding physician change meds.

## 2018-02-15 ENCOUNTER — APPOINTMENT (OUTPATIENT)
Dept: GENERAL RADIOLOGY | Age: 63
DRG: 853 | End: 2018-02-15
Attending: INTERNAL MEDICINE
Payer: COMMERCIAL

## 2018-02-15 PROBLEM — L03.90 CELLULITIS: Status: ACTIVE | Noted: 2018-02-15

## 2018-02-15 LAB
ANION GAP SERPL CALC-SCNC: 9 MMOL/L (ref 7–16)
BASOPHILS # BLD: 0 K/UL (ref 0–0.2)
BASOPHILS NFR BLD: 0 % (ref 0–2)
BUN SERPL-MCNC: 13 MG/DL (ref 8–23)
CALCIUM SERPL-MCNC: 9.3 MG/DL (ref 8.3–10.4)
CHLORIDE SERPL-SCNC: 100 MMOL/L (ref 98–107)
CO2 SERPL-SCNC: 27 MMOL/L (ref 21–32)
CREAT SERPL-MCNC: 0.89 MG/DL (ref 0.8–1.5)
DIFFERENTIAL METHOD BLD: ABNORMAL
DIFFERENTIAL METHOD BLD: ABNORMAL
EOSINOPHIL # BLD: 0.3 K/UL (ref 0–0.8)
EOSINOPHIL # BLD: 0.4 K/UL (ref 0–0.8)
EOSINOPHIL NFR BLD MANUAL: 3 % (ref 1–8)
EOSINOPHIL NFR BLD: 2 % (ref 0.5–7.8)
ERYTHROCYTE [DISTWIDTH] IN BLOOD BY AUTOMATED COUNT: 16.1 % (ref 11.9–14.6)
ERYTHROCYTE [DISTWIDTH] IN BLOOD BY AUTOMATED COUNT: 16.1 % (ref 11.9–14.6)
GLUCOSE SERPL-MCNC: 101 MG/DL (ref 65–100)
HAV IGM SERPL QL IA: NEGATIVE
HBV CORE IGM SERPL QL IA: NEGATIVE
HBV SURFACE AG SERPL QL IA: NEGATIVE
HCT VFR BLD AUTO: 24 % (ref 41.1–50.3)
HCT VFR BLD AUTO: 25.7 % (ref 41.1–50.3)
HCV AB S/CO SERPL IA: <0.1 S/CO RATIO (ref 0–0.9)
HGB BLD-MCNC: 7.8 G/DL (ref 13.6–17.2)
HGB BLD-MCNC: 8 G/DL (ref 13.6–17.2)
IMM GRANULOCYTES # BLD: 0.4 K/UL (ref 0–0.5)
IMM GRANULOCYTES NFR BLD AUTO: 3 % (ref 0–5)
LYMPHOCYTES # BLD: 2.3 K/UL (ref 0.5–4.6)
LYMPHOCYTES # BLD: 4.7 K/UL (ref 0.5–4.6)
LYMPHOCYTES NFR BLD MANUAL: 32 % (ref 16–44)
LYMPHOCYTES NFR BLD: 16 % (ref 13–44)
MCH RBC QN AUTO: 24.4 PG (ref 26.1–32.9)
MCH RBC QN AUTO: 25.3 PG (ref 26.1–32.9)
MCHC RBC AUTO-ENTMCNC: 31.1 G/DL (ref 31.4–35)
MCHC RBC AUTO-ENTMCNC: 32.5 G/DL (ref 31.4–35)
MCV RBC AUTO: 77.9 FL (ref 79.6–97.8)
MCV RBC AUTO: 78.4 FL (ref 79.6–97.8)
MONOCYTES # BLD: 0.6 K/UL (ref 0.1–1.3)
MONOCYTES # BLD: 1.8 K/UL (ref 0.1–1.3)
MONOCYTES NFR BLD MANUAL: 4 % (ref 3–9)
MONOCYTES NFR BLD: 12 % (ref 4–12)
NEUTS SEG # BLD: 8.9 K/UL (ref 1.7–8.2)
NEUTS SEG # BLD: 9.7 K/UL (ref 1.7–8.2)
NEUTS SEG NFR BLD MANUAL: 61 % (ref 47–75)
NEUTS SEG NFR BLD: 67 % (ref 43–78)
PLATELET # BLD AUTO: 687 K/UL (ref 150–450)
PLATELET # BLD AUTO: 727 K/UL (ref 150–450)
PLATELET COMMENTS,PCOM: ABNORMAL
PMV BLD AUTO: 9.3 FL (ref 10.8–14.1)
PMV BLD AUTO: 9.4 FL (ref 10.8–14.1)
POTASSIUM SERPL-SCNC: 3.7 MMOL/L (ref 3.5–5.1)
RBC # BLD AUTO: 3.08 M/UL (ref 4.23–5.67)
RBC # BLD AUTO: 3.28 M/UL (ref 4.23–5.67)
RBC MORPH BLD: ABNORMAL
SODIUM SERPL-SCNC: 136 MMOL/L (ref 136–145)
VANCOMYCIN TROUGH SERPL-MCNC: 10.6 UG/ML (ref 5–20)
WBC # BLD AUTO: 14.4 K/UL (ref 4.3–11.1)
WBC # BLD AUTO: 14.6 K/UL (ref 4.3–11.1)

## 2018-02-15 PROCEDURE — 36415 COLL VENOUS BLD VENIPUNCTURE: CPT | Performed by: INTERNAL MEDICINE

## 2018-02-15 PROCEDURE — 73560 X-RAY EXAM OF KNEE 1 OR 2: CPT

## 2018-02-15 PROCEDURE — 89060 EXAM SYNOVIAL FLUID CRYSTALS: CPT | Performed by: ORTHOPAEDIC SURGERY

## 2018-02-15 PROCEDURE — P9045 ALBUMIN (HUMAN), 5%, 250 ML: HCPCS | Performed by: INTERNAL MEDICINE

## 2018-02-15 PROCEDURE — 85025 COMPLETE CBC W/AUTO DIFF WBC: CPT | Performed by: INTERNAL MEDICINE

## 2018-02-15 PROCEDURE — 74011250636 HC RX REV CODE- 250/636: Performed by: INTERNAL MEDICINE

## 2018-02-15 PROCEDURE — 65270000029 HC RM PRIVATE

## 2018-02-15 PROCEDURE — 84560 ASSAY OF URINE/URIC ACID: CPT

## 2018-02-15 PROCEDURE — 80202 ASSAY OF VANCOMYCIN: CPT | Performed by: INTERNAL MEDICINE

## 2018-02-15 PROCEDURE — 80048 BASIC METABOLIC PNL TOTAL CA: CPT | Performed by: INTERNAL MEDICINE

## 2018-02-15 PROCEDURE — 74011250637 HC RX REV CODE- 250/637: Performed by: INTERNAL MEDICINE

## 2018-02-15 PROCEDURE — 74011250636 HC RX REV CODE- 250/636: Performed by: EMERGENCY MEDICINE

## 2018-02-15 RX ORDER — VANCOMYCIN HYDROCHLORIDE
1250 EVERY 8 HOURS
Status: DISCONTINUED | OUTPATIENT
Start: 2018-02-16 | End: 2018-02-17 | Stop reason: DRUGHIGH

## 2018-02-15 RX ADMIN — HEPARIN SODIUM 5000 UNITS: 5000 INJECTION, SOLUTION INTRAVENOUS; SUBCUTANEOUS at 17:42

## 2018-02-15 RX ADMIN — VANCOMYCIN HYDROCHLORIDE 2000 MG: 10 INJECTION, POWDER, LYOPHILIZED, FOR SOLUTION INTRAVENOUS at 04:03

## 2018-02-15 RX ADMIN — SODIUM CHLORIDE 100 ML/HR: 900 INJECTION, SOLUTION INTRAVENOUS at 02:14

## 2018-02-15 RX ADMIN — TRAMADOL HYDROCHLORIDE 50 MG: 50 TABLET, FILM COATED ORAL at 17:42

## 2018-02-15 RX ADMIN — HEPARIN SODIUM 5000 UNITS: 5000 INJECTION, SOLUTION INTRAVENOUS; SUBCUTANEOUS at 02:00

## 2018-02-15 RX ADMIN — TRAMADOL HYDROCHLORIDE 50 MG: 50 TABLET, FILM COATED ORAL at 08:38

## 2018-02-15 RX ADMIN — FUROSEMIDE 20 MG: 10 INJECTION, SOLUTION INTRAMUSCULAR; INTRAVENOUS at 13:13

## 2018-02-15 RX ADMIN — ACETAMINOPHEN 650 MG: 325 TABLET ORAL at 21:14

## 2018-02-15 RX ADMIN — ALBUMIN (HUMAN) 25 G: 12.5 INJECTION, SOLUTION INTRAVENOUS at 11:16

## 2018-02-15 RX ADMIN — TRAMADOL HYDROCHLORIDE 50 MG: 50 TABLET, FILM COATED ORAL at 02:01

## 2018-02-15 RX ADMIN — HEPARIN SODIUM 5000 UNITS: 5000 INJECTION, SOLUTION INTRAVENOUS; SUBCUTANEOUS at 11:16

## 2018-02-15 NOTE — PROGRESS NOTES
Shift assessment complete, see flow sheet for full assessment. Pt alert and oriented x4, denies pain at this time. Respirations even and unlabored breath sounds clear bilaterally. S1 S2 auscultated, VSS. Skin warm, dry. +3 edema in LLE. Abdomen soft and non tender, bowel sounds active in all quadrants.  Bed low and locked, call light within reach, pt advised to call if assistance is needed

## 2018-02-15 NOTE — PHYSICIAN ADVISORY
Letter of Determination: Upgrade from Observation to Inpatient Status    This patient was originally hospitalized as Outpatient Status with Observation Services on 2/13/2018 for left lower extremity cellulitis. This patient now meets for Inpatient Admission based on medical necessity. The patient's stay was medically necessary based on blood culture results positive for methicillin sensitive staphylococcus aureus in two bottles, and treatement with intravenous vancomycin 2000 mg every 12 hours, and piperacillin-tazobactam 4.5g. It is our recommendation that this patient's hospitalization status should be upgraded from OBSERVATION to INPATIENT status.      The final decision regarding the patient's hospitalization status depends on the attending physician's judgement.     Manju Atkins MD, TIFFANIE,   Physician [place].

## 2018-02-15 NOTE — CONSULTS
Infectious Disease Consult    Today's Date: 2/15/2018   Admit Date: 2/13/2018    Impression:   · MRSA bacteremia (2/13 - 2/14)  · Left knee pain / swelling concerning for possible septic knee  · Recent lumbar spine surgery with hardware, 10/2017 (in [place])  · Elevated LFTs - hep panel negative; ? NAFLD  · Microcytic anemia with elevated ferritin - likely Chronic disease anemia or inflammatory anemia  · Thrombocytosis    Plan:   · Recommend orthopedic consultation to perform arthrocentesis and consider I&D given his painful L knee. · Will order left knee plain films. · Continue IV vancomycin - target trough of 15-20. · He may need MANJINDER but defer for now. · He has no back pain, so will defer MRI of the spine for now. Anti-infectives:     Inpat Anti-Infectives     Start     Ordered Stop    02/15/18 1500  VANCOMYCIN INFORMATION NOTE  Other,   ONCE      02/14/18 1102 02/16/18 0259    02/13/18 1602  vancomycin (VANCOCIN) 2000 mg in  ml infusion  2,000 mg,   IntraVENous,   EVERY 12 HOURS      02/13/18 1602 --          Subjective:   Date of Consultation:  February 15, 2018  Referring Physician: Dr. Orrin Severance    Patient is a 57 y.o. male who presented to [facility] on 2/13 with left lower extremity pain which had worsened over 1-2 weeks prior. He had been doing PT in [place] where he lives related to a recent back surgery, and he started having pain in his leg. He then took a bus to [place] to visit family members here, but his left leg pain worsened and he came to the hospital.  On presentation, he was noted to have non-purulent left leg cellulitis and venous insufficiency, fever, and leukocytosis. He was placed on IV vancomycin. Admission blood cultures are positive for MRSA in 2/2 bottles, and repeat blood cultures from 2/14 are also positive. He continues to have leukocytosis, but his fevers are improved. MANJINDER from 2/14 shows no evidence of endocarditis. Admision CXR and LLE doppler are unremarkable.   Abdominal US was obtained to evaluate elevated LFTs and no strong evidence of cholecystitis is present. ID is consulted for further evaluation. In discussion with the patient, he is unable to move his left leg without significant pain. It is swollen and he has significant LLE edema. When we tried to lift his leg he had severe left knee pain. He denies back pain. Patient Active Problem List   Diagnosis Code    Leukocytosis D72.829    Hypoalbuminemia E88.09    Anemia D64.9    Left leg cellulitis L03. 116    Abnormal transaminases R74.8    Thrombocytosis (HCC) D47.3     History reviewed. No pertinent past medical history. History reviewed. No pertinent family history. Social History   Substance Use Topics    Smoking status: Never Smoker    Smokeless tobacco: Never Used    Alcohol use No     Past Surgical History:   Procedure Laterality Date    HX ORTHOPAEDIC      back      Prior to Admission medications    Medication Sig Start Date End Date Taking? Authorizing Provider   acetaminophen-codeine (TYLENOL-CODEINE #3) 300-30 mg per tablet Take 1 Tab by mouth every four (4) hours as needed for Pain. Yes Phys Other, MD   cyclobenzaprine (FLEXERIL) 5 mg tablet Take 5 mg by mouth. Yes Phys Other, MD   gabapentin (NEURONTIN) 300 mg capsule Take 300 mg by mouth three (3) times daily. Yes Phys Other, MD       No Known Allergies     Review of Systems:  A comprehensive review of systems was negative except for that written in the History of Present Illness. Objective:     Visit Vitals    /67 (BP 1 Location: Right arm, BP Patient Position: Post activity)    Pulse (!) 106    Temp 98.5 °F (36.9 °C)    Resp 18    Ht 5' 8\" (1.727 m)    Wt 113.4 kg (250 lb)    SpO2 97%    BMI 38.01 kg/m2     Temp (24hrs), Av.4 °F (37.4 °C), Min:98.2 °F (36.8 °C), Max:100.2 °F (37.9 °C)       Lines:  Peripheral IV:            Physical Exam:    General:  Alert, cooperative, in no acute distress   Eyes:  Sclera anicteric. Pupils equally round and reactive to light. Mouth/Throat: Mucous membranes normal, oral pharynx clear   Neck: Supple   Lungs:   Clear to auscultation bilaterally, good effort   CV:  Regular rate and rhythm,no murmur, click, rub or gallop   Abdomen:   Soft, non-tender. bowel sounds normal. non-distended   Extremities: No cyanosis; severe LLE edema   Skin: LLE venous stasis changes noted   Lymph nodes: Cervical and supraclavicular normal   Musculoskeletal: L knee is swollen with minimal ROM and severe pain   Lines/Devices:  Intact, no erythema, drainage or tenderness   Psych: Alert and oriented, normal mood affect given the setting       Data Review:     CBC:  Recent Labs      02/15/18   0441  02/14/18   0707  02/13/18   1533   WBC  14.4*  14.6*  13.7*   GRANS  67  61  73   MONOS  12  4  12   EOS  2  3  1   ANEU  9.7*  8.9*  10.0*   ABL  2.3  4.7*  1.7   HGB  7.8*  8.0*  8.5*   HCT  24.0*  25.7*  27.0*   PLT  687*  727*  743*       BMP:  Recent Labs      02/15/18   0441  02/14/18   0707  02/13/18   1533   CREA  0.89  0.81  1.17   BUN  13  15  20   NA  136  136  137   K  3.7  4.5  4.3   CL  100  102  102   CO2  27  25  27   AGAP  9  9  8   GLU  101*  99  134*       LFTS:  Recent Labs      02/14/18   0707  02/13/18   1533   TBILI  0.9  1.3*   ALT  133*  141*   SGOT  133*  174*   AP  198*  212*   TP  6.8  8.1   ALB  1.7*  2.0*       Microbiology:     All Micro Results     Procedure Component Value Units Date/Time    CULTURE, BLOOD [[id number]]  (Abnormal) Collected:  02/13/18 1638    Order Status:  Completed Specimen:  Blood from Blood Updated:  02/15/18 0808     Special Requests: --        LEFT  HAND       GRAM STAIN GRAM POSITIVE COCCI                 AEROBIC AND ANAEROBIC BOTTLES              CRITICAL RESULT NOT CALLED DUE TO PREVIOUS NOTIFICATION OF CRITICAL RESULT WITHIN THE LAST 24 HOURS. Culture result:         MRSA target DNA sequences detected:  SA target DNA sequence detected within the sample.  Test performed by PCR   Culture/Sensitivity to follow (A)            RESULTS VERIFIED, PHONED TO AND READ BACK BY  Debbi Robbins RN ON 2018 AT 2015 WMR        STAPHYLOCOCCUS AUREUS (A)                 CULTURE IN [place] UPDATES TO FOLLOW    CULTURE, BLOOD [[id number]]  (Abnormal) Collected:  18 1626    Order Status:  Completed Specimen:  Blood from Blood Updated:  02/15/18 0804     Special Requests: --        RIGHT  Antecubital       GRAM STAIN GRAM POSITIVE COCCI                 AEROBIC AND ANAEROBIC BOTTLES              RESULTS VERIFIED, PHONED TO AND READ BACK BY [facility] RN @3862 18 BY      Culture result:         STAPHYLOCOCCUS AUREUS IDENTIFICATION AND SUSCEPTIBILITY TO FOLLOW (A)              MRSA target DNA sequences detected:  SA target DNA sequence detected within the sample. Test performed by PCR   Culture/Sensitivity to follow (A)            RESULTS VERIFIED, PHONED TO AND READ BACK BY  ANGÉLICA RUTHERFORD AT 5056 ON 18 BY W      CULTURE, BLOOD [[id number]] Collected:  18 1205    Order Status:  Completed Specimen:  Blood from Blood Updated:  02/15/18 0720     Special Requests: --        NO SPECIAL REQUESTS  LEFT  HAND       GRAM STAIN         ANAEROBIC BOTTLE POSITIVE AEROBIC BOTTLE POSITIVE      GRAM POSITIVE COCCI                 RESULTS VERIFIED, PHONED TO AND READ BACK BY [facility] FLOOR [id number]. SBRAZEL     Culture result:         CULTURE IN PROGRESS,FURTHER UPDATES TO FOLLOW    CULTURE, BLOOD [[id number]] Collected:  18 1157    Order Status:  Completed Specimen:  Blood from Blood Updated:  02/15/18 0718     Special Requests: --        NO SPECIAL REQUESTS  LEFT  Antecubital       GRAM STAIN         GRAM POSITIVE COCCI AEROBIC BOTTLE POSITIVE              RESULTS VERIFIED, PHONED TO AND READ BACK BY [address] [facility] FLOOR [id number].  SBRAZEL     Culture result:         CULTURE IN PROGRESS,FURTHER UPDATES TO FOLLOW          Imagin18 TTE: AORTIC VALVE: The valve was probably trileaflet. There was no evidence for  stenosis. There was no insufficiency. MITRAL VALVE: Valve structure was normal. There was no evidence for stenosis. There was no regurgitation. TRICUSPID VALVE: The valve structure was normal. There was no evidence for  stenosis. There was trivial regurgitation. PULMONIC VALVE: Not well visualized. There was no evidence for stenosis. There  was mild regurgitation. PERICARDIUM: There was no pericardial effusion. AORTA: The root exhibited normal size. 2/14/18  ABD US: IMPRESSION:   1. Unremarkable ultrasound appearance of the liver.     2. No intra- or extrahepatic biliary ductal dilitation.     3.  Patent portal vein and IVC. Flow in the portal vein is hepatopedal in  direction.      4. Cholelithiasis and moderate gallbladder sludge. No strong indicators for  acute cholecystitis are seen although chronic gallbladder dysfunction cannot be  excluded. If further assessment is desired then this be best performed with a  nuclear medicine hepatobiliary scan. 2/13/18 CXR: IMPRESSION:  Minimal linear atelectasis or scarring lingula. No focal infiltrate  or pulmonary edema. 2/13/18 LLE Doppler: IMPRESSION  IMPRESSION:  No deep venous thrombosis identified.     Signed By: Dilma Moore MD     February 15, 2018

## 2018-02-15 NOTE — PROGRESS NOTES
Progress Note    Patient: Yash Felder MRN: [id number]  SSN: [id number]    YOB: 1955  Age: 57 y.o. Sex: male      Admit Date: 2/13/2018    LOS: 0 days     Subjective:   Patient examined at bedside. He was found in no distress. Denies fever, sob, cough, diarrhea. Blood cultures so far all positive for G+ cocci, 1 of them grew MRSA. Currently remains on vancomycin. ECHO with no evidence of vegetations. ID consulted. Patient stated he lives in [place] and would like to complete his antibiotic treatment there once blood culture results are complete. ROS: all pertinent findings described in my note. Objective:     Vitals:    02/14/18 1604 02/14/18 2042 02/14/18 2326 02/15/18 0509   BP:  136/78 138/80 133/79   Pulse:  (!) 120 (!) 117 (!) 111   Resp:  16  18   Temp: 99.7 °F (37.6 °C) 100.2 °F (37.9 °C) 99.9 °F (37.7 °C) 98.2 °F (36.8 °C)   SpO2:  95% 96% 96%   Weight:       Height:            Intake and Output:  Current Shift:    Last three shifts: 02/13 1901 - 02/15 0700  In: 489 [P.O.:300; I.V.:189]  Out: 1590 [Urine:1590]    Physical Exam:   GENERAL: alert, cooperative, no distress, appears stated age  EYE: negative  LYMPHATIC: Cervical, supraclavicular, and axillary nodes normal.   THROAT & NECK: normal and no erythema or exudates noted. LUNG: clear to auscultation bilaterally  HEART: regular rate and rhythm, S1, S2 normal, no murmur, click, rub or gallop  ABDOMEN: soft, non-tender. Bowel sounds normal. No masses,  no organomegaly  EXTREMITIES:  LLE: edema, tender, mild erythema, right leg: atraumatic, no cyanosis or edema  SKIN: Normal.  NEUROLOGIC: negative  Vascular: pulses present   PSYCHIATRIC: non focal    Lab/Data Review: All lab results for the last 24 hours reviewed.      Assessment:     Active Problems:    Leukocytosis (2/13/2018)      Hypoalbuminemia (2/13/2018)      Anemia (2/13/2018)      Left leg cellulitis (2/13/2018)      Abnormal transaminases (2/13/2018) Thrombocytosis ([facility] (2/13/2018)        Plan: 1. Sepsis due to Non purulent- Left leg cellulitis: possible superimposed to venous insufficiency/ MRSA bacteremia: multiple blood cultures positive for G+Cocci - ECHO negative for vegetations- continue vancomycin- keep leg elevated - f/u blood cultures- ID consult     2. Thrombocytosis: possible to stress / infection- monitor daily     3. Hypoalbuminemia: he has been fasting for around 3 weeks due to Druze beliefs / also liver disease to be ruled out in light of elevated lfts: s/p albumin iv total of 4 doses and  lasix     4. Transaminitis and elevated alk phosphatase: liver US was normal- hepatitis panel negative- likely due to bacteremia- monitor     5. Anemia: unknown if SEGUNDO / chronic anemia: low iron stores- may need iron transfusion once his infection is clear as outpatient     6. Obesity: counseling      -DVT ppx: heparin, GCS over right leg     Full code    *Patient status changed to inpatient. ROS, PMH, social hx and family hx unchanged from HPI done on 2/13/18.      Signed By: Bety Pierce MD     February 15, 2018

## 2018-02-15 NOTE — PROGRESS NOTES
Pharmacokinetic Consult to Pharmacist    Terra Gan is a 57 y.o. male being treated for bacteremia with vancomycin. Height: 5' 8\" (172.7 cm)  Weight: 113.4 kg (250 lb)  Lab Results   Component Value Date/Time    BUN 13 02/15/2018 04:41 AM    Creatinine 0.89 02/15/2018 04:41 AM    WBC 14.4 (H) 02/15/2018 04:41 AM      Estimated Creatinine Clearance: 102.5 mL/min (based on Cr of 0.89). CULTURES:  All Micro Results     Procedure Component Value Units Date/Time    CULTURE, BLOOD [[id number]] Collected:  02/14/18 1205    Order Status:  Completed Specimen:  Blood from Blood Updated:  02/15/18 1249     Special Requests: --        NO SPECIAL REQUESTS  LEFT  HAND       GRAM STAIN GRAM POSITIVE COCCI                 RESULTS VERIFIED, PHONED TO AND READ BACK BY Zaida Rios [facility] [id number]. SBRAZEL              AEROBIC AND ANAEROBIC BOTTLES     Culture result:         CULTURE IN PROGRESS,FURTHER UPDATES TO FOLLOW    CULTURE, BLOOD [[id number]]  (Abnormal) Collected:  02/13/18 1638    Order Status:  Completed Specimen:  Blood from Blood Updated:  02/15/18 0808     Special Requests: --        LEFT  HAND       GRAM STAIN GRAM POSITIVE COCCI                 AEROBIC AND ANAEROBIC BOTTLES              CRITICAL RESULT NOT CALLED DUE TO PREVIOUS NOTIFICATION OF CRITICAL RESULT WITHIN THE LAST 24 HOURS. Culture result:         MRSA target DNA sequences detected:  SA target DNA sequence detected within the sample.  Test performed by PCR   Culture/Sensitivity to follow (A)            RESULTS VERIFIED, PHONED TO AND READ BACK BY  Joe Cheney RN ON 02/14/2018 AT 2015 R        STAPHYLOCOCCUS AUREUS (A)                 [facility] UPDATES TO FOLLOW    CULTURE, BLOOD [[id number]]  (Abnormal) Collected:  02/13/18 1626    Order Status:  Completed Specimen:  Blood from Blood Updated:  02/15/18 0804     Special Requests: --        RIGHT  Antecubital       GRAM STAIN GRAM POSITIVE COCCI                 AEROBIC AND ANAEROBIC BOTTLES              RESULTS VERIFIED, PHONED TO AND READ BACK BY Woody GREENE RN @4379 2/14/18 BY      Culture result:         STAPHYLOCOCCUS AUREUS IDENTIFICATION AND SUSCEPTIBILITY TO FOLLOW (A)              MRSA target DNA sequences detected:  SA target DNA sequence detected within the sample. Test performed by PCR   Culture/Sensitivity to follow (A)            RESULTS VERIFIED, PHONED TO AND READ BACK BY  ANGÉLICA RUTHERFORD AT 7121 ON 2.14.18 BY W      CULTURE, BLOOD [[id number]] Collected:  02/14/18 1157    Order Status:  Completed Specimen:  Blood from Blood Updated:  02/15/18 0718     Special Requests: --        NO SPECIAL REQUESTS  LEFT  Antecubital       GRAM STAIN         GRAM POSITIVE COCCI AEROBIC BOTTLE POSITIVE              RESULTS VERIFIED, PHONED TO AND READ BACK BY [facility] [id number]. SBRAZEL     Culture result:         CULTURE IN PROGRESS,FURTHER UPDATES TO FOLLOW            Lab Results   Component Value Date/Time    Vancomycin,trough 10.6 02/15/2018 02:58 PM       Day 3 of vancomycin. Goal trough is 15-20.  2000mg dose is hanging currently. Will increase vancomycin to 1250mg q 8 hours starting tomorrow at 0100. Will continue to follow patient.       Thank you,  Tarry Eisenmenger, PharmD

## 2018-02-15 NOTE — PROGRESS NOTES
Spiritual Care visit. Initial visit.  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  affirmed his diverse experiences, and prayed for his recovery and health.     Visit by Sydney aYdav M.Ed., Th.B. ,Staff

## 2018-02-16 ENCOUNTER — APPOINTMENT (OUTPATIENT)
Dept: MRI IMAGING | Age: 63
DRG: 853 | End: 2018-02-16
Attending: INTERNAL MEDICINE
Payer: COMMERCIAL

## 2018-02-16 LAB
ABO + RH BLD: NORMAL
ALBUMIN SERPL-MCNC: 2.4 G/DL (ref 3.2–4.6)
ALBUMIN/GLOB SERPL: 0.5 {RATIO} (ref 1.2–3.5)
ALP SERPL-CCNC: 182 U/L (ref 50–136)
ALT SERPL-CCNC: 125 U/L (ref 12–65)
ANION GAP SERPL CALC-SCNC: 11 MMOL/L (ref 7–16)
AST SERPL-CCNC: 112 U/L (ref 15–37)
BACTERIA SPEC CULT: ABNORMAL
BILIRUB DIRECT SERPL-MCNC: 0.3 MG/DL
BILIRUB SERPL-MCNC: 0.8 MG/DL (ref 0.2–1.1)
BLOOD GROUP ANTIBODIES SERPL: NORMAL
BUN SERPL-MCNC: 11 MG/DL (ref 8–23)
CALCIUM SERPL-MCNC: 9.4 MG/DL (ref 8.3–10.4)
CHLORIDE SERPL-SCNC: 99 MMOL/L (ref 98–107)
CO2 SERPL-SCNC: 26 MMOL/L (ref 21–32)
CREAT SERPL-MCNC: 0.86 MG/DL (ref 0.8–1.5)
DIFFERENTIAL METHOD BLD: ABNORMAL
EOSINOPHIL # BLD: 0.2 K/UL (ref 0–0.8)
EOSINOPHIL NFR BLD MANUAL: 2 % (ref 1–8)
ERYTHROCYTE [DISTWIDTH] IN BLOOD BY AUTOMATED COUNT: 16.1 % (ref 11.9–14.6)
GLOBULIN SER CALC-MCNC: 5.3 G/DL (ref 2.3–3.5)
GLUCOSE SERPL-MCNC: 109 MG/DL (ref 65–100)
GRAM STN SPEC: ABNORMAL
HCT VFR BLD AUTO: 25.3 % (ref 41.1–50.3)
HGB BLD-MCNC: 8 G/DL (ref 13.6–17.2)
LYMPHOCYTES # BLD: 2.5 K/UL (ref 0.5–4.6)
LYMPHOCYTES NFR BLD MANUAL: 20 % (ref 16–44)
MCH RBC QN AUTO: 24.5 PG (ref 26.1–32.9)
MCHC RBC AUTO-ENTMCNC: 31.6 G/DL (ref 31.4–35)
MCV RBC AUTO: 77.6 FL (ref 79.6–97.8)
METAMYELOCYTES NFR BLD MANUAL: 3 %
MONOCYTES # BLD: 0.7 K/UL (ref 0.1–1.3)
MONOCYTES NFR BLD MANUAL: 6 % (ref 3–9)
NEUTS BAND NFR BLD MANUAL: 1 % (ref 0–6)
NEUTS SEG # BLD: 9 K/UL (ref 1.7–8.2)
NEUTS SEG NFR BLD MANUAL: 68 % (ref 47–75)
NRBC BLD-RTO: 2 PER 100 WBC
PLATELET # BLD AUTO: 711 K/UL (ref 150–450)
PLATELET COMMENTS,PCOM: ABNORMAL
PMV BLD AUTO: 9.1 FL (ref 10.8–14.1)
POTASSIUM SERPL-SCNC: 3.7 MMOL/L (ref 3.5–5.1)
PROT SERPL-MCNC: 7.7 G/DL (ref 6.3–8.2)
RBC # BLD AUTO: 3.26 M/UL (ref 4.23–5.67)
RBC MORPH BLD: ABNORMAL
SERVICE CMNT-IMP: ABNORMAL
SODIUM SERPL-SCNC: 136 MMOL/L (ref 136–145)
SPECIMEN EXP DATE BLD: NORMAL
WBC # BLD AUTO: 12.4 K/UL (ref 4.3–11.1)

## 2018-02-16 PROCEDURE — 86900 BLOOD TYPING SEROLOGIC ABO: CPT | Performed by: SURGERY

## 2018-02-16 PROCEDURE — 74011250637 HC RX REV CODE- 250/637: Performed by: INTERNAL MEDICINE

## 2018-02-16 PROCEDURE — A9577 INJ MULTIHANCE: HCPCS | Performed by: INTERNAL MEDICINE

## 2018-02-16 PROCEDURE — 87205 SMEAR GRAM STAIN: CPT | Performed by: INTERNAL MEDICINE

## 2018-02-16 PROCEDURE — 80076 HEPATIC FUNCTION PANEL: CPT | Performed by: INTERNAL MEDICINE

## 2018-02-16 PROCEDURE — 74011000258 HC RX REV CODE- 258: Performed by: INTERNAL MEDICINE

## 2018-02-16 PROCEDURE — P9045 ALBUMIN (HUMAN), 5%, 250 ML: HCPCS | Performed by: INTERNAL MEDICINE

## 2018-02-16 PROCEDURE — 74011250636 HC RX REV CODE- 250/636: Performed by: INTERNAL MEDICINE

## 2018-02-16 PROCEDURE — 85025 COMPLETE CBC W/AUTO DIFF WBC: CPT | Performed by: INTERNAL MEDICINE

## 2018-02-16 PROCEDURE — 87040 BLOOD CULTURE FOR BACTERIA: CPT | Performed by: INTERNAL MEDICINE

## 2018-02-16 PROCEDURE — 74011000250 HC RX REV CODE- 250: Performed by: ORTHOPAEDIC SURGERY

## 2018-02-16 PROCEDURE — 87186 SC STD MICRODIL/AGAR DIL: CPT | Performed by: INTERNAL MEDICINE

## 2018-02-16 PROCEDURE — 65270000029 HC RM PRIVATE

## 2018-02-16 PROCEDURE — 36415 COLL VENOUS BLD VENIPUNCTURE: CPT | Performed by: INTERNAL MEDICINE

## 2018-02-16 PROCEDURE — 87077 CULTURE AEROBIC IDENTIFY: CPT | Performed by: INTERNAL MEDICINE

## 2018-02-16 PROCEDURE — 87070 CULTURE OTHR SPECIMN AEROBIC: CPT | Performed by: INTERNAL MEDICINE

## 2018-02-16 PROCEDURE — 80048 BASIC METABOLIC PNL TOTAL CA: CPT | Performed by: INTERNAL MEDICINE

## 2018-02-16 PROCEDURE — 87075 CULTR BACTERIA EXCEPT BLOOD: CPT | Performed by: INTERNAL MEDICINE

## 2018-02-16 PROCEDURE — 73720 MRI LWR EXTREMITY W/O&W/DYE: CPT

## 2018-02-16 RX ORDER — HYDROMORPHONE HYDROCHLORIDE 2 MG/ML
0.5 INJECTION, SOLUTION INTRAMUSCULAR; INTRAVENOUS; SUBCUTANEOUS
Status: DISCONTINUED | OUTPATIENT
Start: 2018-02-16 | End: 2018-02-21 | Stop reason: HOSPADM

## 2018-02-16 RX ORDER — SODIUM CHLORIDE 9 MG/ML
250 INJECTION, SOLUTION INTRAVENOUS AS NEEDED
Status: DISCONTINUED | OUTPATIENT
Start: 2018-02-16 | End: 2018-02-21 | Stop reason: HOSPADM

## 2018-02-16 RX ORDER — LIDOCAINE HYDROCHLORIDE 10 MG/ML
10 INJECTION, SOLUTION EPIDURAL; INFILTRATION; INTRACAUDAL; PERINEURAL ONCE
Status: COMPLETED | OUTPATIENT
Start: 2018-02-16 | End: 2018-02-16

## 2018-02-16 RX ORDER — SODIUM CHLORIDE 0.9 % (FLUSH) 0.9 %
10 SYRINGE (ML) INJECTION
Status: COMPLETED | OUTPATIENT
Start: 2018-02-16 | End: 2018-02-16

## 2018-02-16 RX ORDER — LIDOCAINE HYDROCHLORIDE 10 MG/ML
10 INJECTION, SOLUTION EPIDURAL; INFILTRATION; INTRACAUDAL; PERINEURAL ONCE
Status: DISPENSED | OUTPATIENT
Start: 2018-02-16 | End: 2018-02-17

## 2018-02-16 RX ORDER — NALOXONE HYDROCHLORIDE 0.4 MG/ML
0.4 INJECTION, SOLUTION INTRAMUSCULAR; INTRAVENOUS; SUBCUTANEOUS
Status: DISCONTINUED | OUTPATIENT
Start: 2018-02-16 | End: 2018-02-21 | Stop reason: HOSPADM

## 2018-02-16 RX ADMIN — ALBUMIN (HUMAN) 25 G: 12.5 INJECTION, SOLUTION INTRAVENOUS at 00:45

## 2018-02-16 RX ADMIN — ACETAMINOPHEN 650 MG: 325 TABLET ORAL at 12:01

## 2018-02-16 RX ADMIN — TRAMADOL HYDROCHLORIDE 50 MG: 50 TABLET, FILM COATED ORAL at 17:54

## 2018-02-16 RX ADMIN — Medication 10 ML: at 16:35

## 2018-02-16 RX ADMIN — HEPARIN SODIUM 5000 UNITS: 5000 INJECTION, SOLUTION INTRAVENOUS; SUBCUTANEOUS at 11:42

## 2018-02-16 RX ADMIN — FUROSEMIDE 20 MG: 10 INJECTION, SOLUTION INTRAMUSCULAR; INTRAVENOUS at 12:25

## 2018-02-16 RX ADMIN — TRAMADOL HYDROCHLORIDE 50 MG: 50 TABLET, FILM COATED ORAL at 12:01

## 2018-02-16 RX ADMIN — VANCOMYCIN HYDROCHLORIDE 1250 MG: 10 INJECTION, POWDER, LYOPHILIZED, FOR SOLUTION INTRAVENOUS at 02:20

## 2018-02-16 RX ADMIN — GADOBENATE DIMEGLUMINE 10 ML: 529 INJECTION, SOLUTION INTRAVENOUS at 16:35

## 2018-02-16 RX ADMIN — VANCOMYCIN HYDROCHLORIDE 1250 MG: 10 INJECTION, POWDER, LYOPHILIZED, FOR SOLUTION INTRAVENOUS at 18:00

## 2018-02-16 RX ADMIN — TRAMADOL HYDROCHLORIDE 50 MG: 50 TABLET, FILM COATED ORAL at 02:37

## 2018-02-16 RX ADMIN — HEPARIN SODIUM 5000 UNITS: 5000 INJECTION, SOLUTION INTRAVENOUS; SUBCUTANEOUS at 18:47

## 2018-02-16 RX ADMIN — LIDOCAINE HYDROCHLORIDE 1 ML: 10 INJECTION, SOLUTION EPIDURAL; INFILTRATION; INTRACAUDAL; PERINEURAL at 20:00

## 2018-02-16 RX ADMIN — HEPARIN SODIUM 5000 UNITS: 5000 INJECTION, SOLUTION INTRAVENOUS; SUBCUTANEOUS at 02:37

## 2018-02-16 RX ADMIN — PIPERACILLIN SODIUM,TAZOBACTAM SODIUM 4.5 G: 4; .5 INJECTION, POWDER, FOR SOLUTION INTRAVENOUS at 21:25

## 2018-02-16 RX ADMIN — VANCOMYCIN HYDROCHLORIDE 1250 MG: 10 INJECTION, POWDER, LYOPHILIZED, FOR SOLUTION INTRAVENOUS at 11:42

## 2018-02-16 NOTE — PROGRESS NOTES
Tylenol 650 mg given at this time for c/o left knee pain that pt rated at 6/10. Will monitor effectiveness.

## 2018-02-16 NOTE — PROGRESS NOTES
Dual skin assessment completed with Joya Mendoza. No skin concerns noted. Patient has a band-aid to left knee from puncture for synovial fluids aspiration.

## 2018-02-16 NOTE — CONSULTS
[address]    Shelley Ashford  MR#: [id number]  : 1955  ACCOUNT #: [de-identified]   DATE OF SERVICE: 02/15/2018    CHIEF COMPLAINT:  Possible septic left knee. HISTORY OF PRESENT ILLNESS:  Mr. Kris Potts is a 13-year-old male who has been hospitalized at [address] [place] [address] since . The details of his hospitalization are well documented in his chart. he has sepsis, along with left lower extremity swelling. He reports that his left knee has never been a problem in the past.  He does not know of any serious injury to his left knee. Ten years ago, his right knee gave him some trouble, but that resolved uneventfully. He says neither hip has been a bother to him. He denies a history of gout. He reports that he has pain in his left knee, but a generalized discomfort in the entire left lower extremity also. He says that his upper extremity joints do not bother him. He had lumbar spine surgery last fall. PAST FAMILY/SOCIAL HISTORY:  He is recently . He grew up in [place] but has lived in [place] for the last 12 years. He is an . He has friends and family in the [place] area. REVIEW OF SYSTEMS:  Positive for lumbar spine disease, for which he has had surgery. He does not know of any preexisting gout, hypertension, diabetes, seizure disorder. FAMILY HISTORY:  Noncontributory. PHYSICAL EXAMINATION:   GENERAL:  The examination takes place with him supine in his bed. VITAL SIGNS:  He is afebrile. His pulse is recorded as 110 and blood pressure 116/62. Respirations are 18 and O2 sat is 96%. GENERAL:  He is alert, oriented and conversant. EXTREMITIES:  He feels slightly warm. His upper extremities show no restriction of motion, hot, painful or swollen joints.   His hips have diminished range bilaterally with each externally rotating about 30 degrees and internally rotating to neutral.  Neither hip is painful to him. His right knee has no effusion. His right lower extremity is not swollen. His entire left lower extremity is swollen with a diffuse edema. He has strong pedal pulses in both feet. He has a trace effusion in the left knee by palpation. There is no point tenderness about the left knee. Comfortable ROM is 0-80. RADIOGRAPHS:  Multiple x-rays of the left knee show severe osteoarthritis of all three compartments of the knee. There is bone-on-bone component to it. I do not clearly see an effusion on the x-ray. PROCEDURE NOTE:  After the patient granted permission to do the same, I prepped the skin with Betadine, anesthetized the medial parapatellar area of the left knee with 1 mL of 1% plain lidocaine and then I aspirated his knee with an 18 gauge needle obtaining approximately 5 mL of cloudy yellowish fluid with white specks in it. This has the appearance of gout. The fluid was placed on a purple top, red top, and culture tube and sent to the lab with appropriate orders. IMPRESSION:  1. Sepsis. 2.  Left lower extremity edema, multifactorial.  3.  Probable gouty inflammation of the left knee. 4. Severe osteoathritis of the left knee    DISCUSSION:  I do not think this is going to prove to be an infected left knee, but cultures will be of great help with that. I have discussed with him the possibility that if septic, it may warrant repeat aspiration or possibly surgery. He may continue to use the leg as he is comfortable doing so. MD Corazon Sánchez / Nannette Castleman  D: 02/15/2018 19:52     T: 02/16/2018 00:19  JOB #: [id number]  CC: Grant Cantu MD

## 2018-02-16 NOTE — PROGRESS NOTES
TRANSFER - IN REPORT:    Verbal report received from [facility] (name) on Clarisa Rodriguez  being received from Ortho (unit) for routine progression of care      Report consisted of patients Situation, Background, Assessment and   Recommendations(SBAR). Information from the following report(s) SBAR, Kardex, Intake/Output, MAR, Accordion, Recent Results and Med Rec Status was reviewed with the receiving nurse. Opportunity for questions and clarification was provided. Assessment completed upon patients arrival to unit and care assumed.

## 2018-02-16 NOTE — PROGRESS NOTES
Boyd Atkins has called the lab and they indicate that the only study they have done is synovial fluid analysis for crystals, and they indicate that this is \"being sent out\". That confuses me. The lab says they have the tubes and the fluid. We are going to try once again to obtain the studiess ordered. Lest anyone wonder, please read my \"nursing misc\" order of 8 pm Thurs . Each desired study is clearly described there. I don't know what else one can do . Today his knee does not have an effusion. His whole leg is swollen in a fashion I associate with low oncotic pressure of hypoalbuminemia. He is profoundly malnourished based on his labs and clinical history. Based on my clinical exam today I don not think he has a septic knee or an abcess in his left lower extremity.

## 2018-02-16 NOTE — PROGRESS NOTES
Full note dictated. I aspirated 5 cc of fluid from knee. It appears similar to gout fluid. Very little fluid in the knee.

## 2018-02-16 NOTE — PROGRESS NOTES
Am assessment completed. Pt is alert and oriented x 3, lungs clear, breathing non-labored. Bowel sounds + q 4 continent of bowel and bladder. Verbalizes needs well. Tolerating meals. Took shower this morning. Legs swollen left leg more that right. Left leg hot to touch. Ambulates very slowly and with a walker. Continue to monitor.

## 2018-02-16 NOTE — PROGRESS NOTES
The uric acid test was sent to [facility] by our lab on the knee for processing

## 2018-02-16 NOTE — PROGRESS NOTES
Ortho notified of MRI findings as was infectious disease. Dr. Tone Oglesby also talking with MD on call for ortho.

## 2018-02-16 NOTE — PROGRESS NOTES
Infectious Disease Progress Note    Today's Date: 2018   Admit Date: 2018    Impression:   · MRSA bacteremia ( - )  · Left knee pain / swelling concerning for possible septic knee  · Recent lumbar spine surgery with hardware, 10/2017 (in [place])  · Elevated LFTs - hep panel negative; ? NAFLD  · Microcytic anemia with elevated ferritin - likely Chronic disease anemia or inflammatory anemia  · Thrombocytosis    Plan:   · Continue IV vancomycin. · Repeat blood cultures today. · He may need MANJINDER but defer until next week. · He seems a bit confused so will need to keep an eye on him. · Unclear if cultures were sent from left knee - is this the miscellaneous test at [facility]? · Will get MRI of left leg to clarify what's going on. · Defer MRI of the spine - no back pain and he is able to flex hips without pain. Anti-infectives:   IV vancomycin    Subjective:   Date of Consultation:  2018  Referring Physician: Dr. Jabier Clarke    He was able to get a shower today; remains tachycardic; says he is worried about a fever but none recorded; also seemed unclear that he is already on IV antibiotics    No Known Allergies     Review of Systems:  Pertinent items are noted in the History of Present Illness.     Objective:     Visit Vitals    /81 (BP 1 Location: Left arm, BP Patient Position: At rest)    Pulse (!) 106    Temp 99.8 °F (37.7 °C)    Resp 18    Ht 5' 8\" (1.727 m)    Wt 113.4 kg (250 lb)    SpO2 100%    BMI 38.01 kg/m2     Temp (24hrs), Av.8 °F (37.1 °C), Min:96.9 °F (36.1 °C), Max:99.8 °F (37.7 °C)       Lines:  Peripheral IV:            Physical Exam:    General:  Alert, cooperative, in shower chair;   Eyes:  Sclera anicteric   Mouth/Throat: oral pharynx clear   Neck: Supple   Lungs:   Breathing comfortably   CV:  Tachycardic   Abdomen:   non-distended   Extremities: No cyanosis; severe LLE edema   Skin: LLE venous stasis changes noted   Lymph nodes:    Musculoskeletal: L knee is swollen with minimal ROM   Lines/Devices:  Intact, no erythema, drainage or tenderness   Psych: Alert and responsive; confused about some aspects of care;       Data Review:     CBC:  Recent Labs      02/16/18   0642  02/15/18   0441  02/14/18   0707   WBC  12.4*  14.4*  14.6*   GRANS  68  67  61   MONOS  6  12  4   EOS  2  2  3   ANEU  9.0*  9.7*  8.9*   ABL  2.5  2.3  4.7*   HGB  8.0*  7.8*  8.0*   HCT  25.3*  24.0*  25.7*   PLT  711*  687*  727*       BMP:  Recent Labs      02/16/18   0642  02/15/18   0441  02/14/18   0707   CREA  0.86  0.89  0.81   BUN  11  13  15   NA  136  136  136   K  3.7  3.7  4.5   CL  99  100  102   CO2  26  27  25   AGAP  11  9  9   GLU  109*  101*  99       LFTS:  Recent Labs      02/16/18   0642  02/14/18   0707  02/13/18   1533   TBILI  0.8  0.9  1.3*   ALT  125*  133*  141*   SGOT  112*  133*  174*   AP  182*  198*  212*   TP  7.7  6.8  8.1   ALB  2.4*  1.7*  2.0*       Microbiology:     All Micro Results     Procedure Component Value Units Date/Time    CULTURE, BLOOD [[id number]] Collected:  02/16/18 0852    Order Status:  Completed Specimen:  Blood from Blood Updated:  02/16/18 0857    CULTURE, BLOOD [[id number]] Collected:  02/16/18 0846    Order Status:  Completed Specimen:  Blood from Blood Updated:  02/16/18 0856    CULTURE, BLOOD [[id number]]  (Abnormal) Collected:  02/14/18 1157    Order Status:  Completed Specimen:  Blood from Blood Updated:  02/16/18 0725     Special Requests: --        NO SPECIAL REQUESTS  LEFT  Antecubital       GRAM STAIN         GRAM POSITIVE COCCI AEROBIC BOTTLE POSITIVE              RESULTS VERIFIED, PHONED TO AND READ BACK BY [address] [facility] FLOOR [id number].  SBRAZEL     Culture result: STAPHYLOCOCCUS AUREUS (A)         REFER TO [id number] FOR ID AND SUSCEPTIBILITY    CULTURE, BLOOD [[id number]]  (Abnormal) Collected:  02/14/18 1205    Order Status:  Completed Specimen:  Blood from Blood Updated:  02/16/18 0725     Special Requests: --        NO SPECIAL REQUESTS  LEFT  HAND       GRAM STAIN GRAM POSITIVE COCCI                 RESULTS VERIFIED, PHONED TO AND READ BACK BY [id number] [facility] [id number]. SBRAZEL              AEROBIC AND ANAEROBIC BOTTLES     Culture result: STAPHYLOCOCCUS AUREUS (A)         REFER TO [id number] FOR ID AND SUSCEPTIBILITY    CULTURE, BLOOD [[id number]]  (Abnormal) Collected:  18 1638    Order Status:  Completed Specimen:  Blood from Blood Updated:  18     Special Requests: --        LEFT  HAND       GRAM STAIN GRAM POSITIVE COCCI                 AEROBIC AND ANAEROBIC BOTTLES              CRITICAL RESULT NOT CALLED DUE TO PREVIOUS NOTIFICATION OF CRITICAL RESULT WITHIN THE LAST 24 HOURS. Culture result: STAPHYLOCOCCUS AUREUS (A)               RESULTS VERIFIED, PHONED TO AND READ BACK BY  Audrey Dominguez RN ON 2018 AT 2015 [facility]                MRSA target DNA sequences detected:  SA target DNA sequence detected within the sample. Test performed by PCR   Culture/Sensitivity to follow (A)      REFER TO [id number]    CULTURE, BLOOD [[id number]]  (Abnormal)  (Susceptibility) Collected:  18 1626    Order Status:  Completed Specimen:  Blood from Blood Updated:  18     Special Requests: --        RIGHT  Antecubital       GRAM STAIN GRAM POSITIVE COCCI                 AEROBIC AND ANAEROBIC BOTTLES              RESULTS VERIFIED, PHONED TO AND READ BACK BY [facility] RN @7862 18 BY      Culture result:         **METHICILLIN RESISTANT STAPHYLOCOCCUS AUREUS** Use Rifampin only in conjunction with another antimicrobial agent. Do not use as Monotherapy. (A)              MRSA target DNA sequences detected:  SA target DNA sequence detected within the sample. Test performed by PCR   Culture/Sensitivity to follow (A)            RESULTS VERIFIED, PHONED TO AND READ BACK BY  ANGÉLICA RUTHERFORD AT 1949 ON 18 BY             Imagin/15/18 L knee film: IMPRESSION:  1.  Large joint effusion and soft tissue edema without acute osseous abnormality. An infectious/inflammatory process of the left knee joint cannot be excluded  given the appearance. Recommend clinical correlation.     Signed By: Carmelita Perez MD     February 16, 2018

## 2018-02-16 NOTE — PROGRESS NOTES
Admission assessment completed via doc flow sheet. Patient is alert and oriented x 4. Respirations even and unlabored on room air. Lung sounds CTA bilaterally. Heart sounds S1, S2 auscultated and regular. Abdomen soft but obese. Bowel sounds active to all 4 quadrants. IV flushed  without difficulty. Patient uses personal walker to ambulate. Patient reported left knee pain that he rated at 6/10 at this time. Will medicate with tylenol. Bed is locked and in low position. Bed rails x 2. Patient is encouraged to call for assistance. Call light within reach.

## 2018-02-16 NOTE — PROGRESS NOTES
Progress Note    Patient: Sudheer Bernstein MRN: [id number]  SSN: [id number]    YOB: 1955  Age: 57 y.o. Sex: male      Admit Date: 2/13/2018    LOS: 3 days     Subjective:   Patient examined at bedside. He was found in no distress. MRI of the left leg showed multiloculated fluid collections over left leg and large effusion at left knee joint concerning for septic arthritis. I contacted orthopedic MD on call Dr Carolin Bustillos and case explained. He stated will come to evaluate patient. General surgery consult placed for I&D left leg. ROS: all pertinent findings described in my note. Objective:     Vitals:    02/15/18 1107 02/15/18 1531 02/15/18 2232 02/16/18 0401   BP: 133/71 116/62 133/84 138/73   Pulse: (!) 103 (!) 110 (!) 112 (!) 102   Resp: 18 18 20 18   Temp: 98.8 °F (37.1 °C) 99.7 °F (37.6 °C) 98.9 °F (37.2 °C) 96.9 °F (36.1 °C)   SpO2: 92% 96% 94% 96%   Weight:       Height:            Intake and Output:  Current Shift:    Last three shifts: 02/14 1901 - 02/16 0700  In: 489 [P.O.:300; I.V.:189]  Out: 1090 [Urine:1090]    Physical Exam:   GENERAL: alert, cooperative, no distress, appears stated age  EYE: negative  LYMPHATIC: Cervical, supraclavicular, and axillary nodes normal.   THROAT & NECK: normal and no erythema or exudates noted. LUNG: clear to auscultation bilaterally  HEART: regular rate and rhythm, S1, S2 normal, no murmur, click, rub or gallop  ABDOMEN: soft, non-tender. Bowel sounds normal. No masses,  no organomegaly  EXTREMITIES:  LLE: edema, tender, mild erythema, right leg: atraumatic, no cyanosis or edema  SKIN: Normal.  NEUROLOGIC: negative  Vascular: pulses present   PSYCHIATRIC: non focal    Lab/Data Review: All lab results for the last 24 hours reviewed.      Assessment:     Active Problems:    Leukocytosis (2/13/2018)      Hypoalbuminemia (2/13/2018)      Anemia (2/13/2018)      Left leg cellulitis (2/13/2018)      Abnormal transaminases (2/13/2018) Thrombocytosis ([facility] [address] (2/13/2018)      Cellulitis (2/15/2018)        Plan: 1. Sepsis of the Lef leg with multiloculated abscess / large left knee joint effusion concerning for septic arthritis-SP MRI findings  / MRSA bacteremia: ECHO negative for vegetations- continue vancomycin- f/u blood cultures- ID F/U- ortho and general surgery consult- patient may need I&D and knee drainage. 2.Thrombocytosis: possible to stress / infection- monitor daily     3. Hypoalbuminemia: he has been fasting for around 3 weeks due to Shinto beliefs / also liver disease to be ruled out in light of elevated lfts: s/p albumin iv total of 4 doses and  lasix     4. Transaminitis and elevated alk phosphatase: liver US was normal- hepatitis panel negative- likely due to bacteremia- monitor     5. Anemia: unknown if SEGUNDO / chronic anemia: low iron stores- may need iron transfusion once his infection is clear as outpatient     6. Obesity: counseling      -DVT ppx: heparin, GCS over right leg     Full code      Signed By: Joanne Mak MD     February 16, 2018

## 2018-02-17 ENCOUNTER — ANESTHESIA (OUTPATIENT)
Dept: SURGERY | Age: 63
DRG: 853 | End: 2018-02-17
Payer: COMMERCIAL

## 2018-02-17 ENCOUNTER — ANESTHESIA EVENT (OUTPATIENT)
Dept: SURGERY | Age: 63
DRG: 853 | End: 2018-02-17
Payer: COMMERCIAL

## 2018-02-17 LAB
ALBUMIN SERPL-MCNC: 2.6 G/DL (ref 3.2–4.6)
ALBUMIN/GLOB SERPL: 0.4 {RATIO} (ref 1.2–3.5)
ALP SERPL-CCNC: 226 U/L (ref 50–136)
ALT SERPL-CCNC: 175 U/L (ref 12–65)
ANION GAP SERPL CALC-SCNC: 11 MMOL/L (ref 7–16)
AST SERPL-CCNC: 145 U/L (ref 15–37)
BASOPHILS # BLD: 0.1 K/UL (ref 0–0.2)
BASOPHILS NFR BLD: 0 % (ref 0–2)
BILIRUB DIRECT SERPL-MCNC: 0.3 MG/DL
BILIRUB SERPL-MCNC: 0.9 MG/DL (ref 0.2–1.1)
BUN SERPL-MCNC: 11 MG/DL (ref 8–23)
CALCIUM SERPL-MCNC: 10.3 MG/DL (ref 8.3–10.4)
CHLORIDE SERPL-SCNC: 98 MMOL/L (ref 98–107)
CO2 SERPL-SCNC: 27 MMOL/L (ref 21–32)
CREAT SERPL-MCNC: 0.93 MG/DL (ref 0.8–1.5)
DIFFERENTIAL METHOD BLD: ABNORMAL
EOSINOPHIL # BLD: 0.4 K/UL (ref 0–0.8)
EOSINOPHIL NFR BLD: 3 % (ref 0.5–7.8)
ERYTHROCYTE [DISTWIDTH] IN BLOOD BY AUTOMATED COUNT: 16.5 % (ref 11.9–14.6)
GLOBULIN SER CALC-MCNC: 6.3 G/DL (ref 2.3–3.5)
GLUCOSE SERPL-MCNC: 108 MG/DL (ref 65–100)
HCT VFR BLD AUTO: 29 % (ref 41.1–50.3)
HGB BLD-MCNC: 8.8 G/DL (ref 13.6–17.2)
IMM GRANULOCYTES # BLD: 0.7 K/UL (ref 0–0.5)
IMM GRANULOCYTES NFR BLD AUTO: 5 % (ref 0–5)
LYMPHOCYTES # BLD: 2.2 K/UL (ref 0.5–4.6)
LYMPHOCYTES NFR BLD: 15 % (ref 13–44)
MCH RBC QN AUTO: 23.8 PG (ref 26.1–32.9)
MCHC RBC AUTO-ENTMCNC: 30.3 G/DL (ref 31.4–35)
MCV RBC AUTO: 78.4 FL (ref 79.6–97.8)
MONOCYTES # BLD: 1.4 K/UL (ref 0.1–1.3)
MONOCYTES NFR BLD: 10 % (ref 4–12)
NEUTS SEG # BLD: 9.6 K/UL (ref 1.7–8.2)
NEUTS SEG NFR BLD: 67 % (ref 43–78)
PLATELET # BLD AUTO: 798 K/UL (ref 150–450)
PMV BLD AUTO: 9.3 FL (ref 10.8–14.1)
POTASSIUM SERPL-SCNC: 3.8 MMOL/L (ref 3.5–5.1)
PROT SERPL-MCNC: 8.9 G/DL (ref 6.3–8.2)
RBC # BLD AUTO: 3.7 M/UL (ref 4.23–5.67)
SODIUM SERPL-SCNC: 136 MMOL/L (ref 136–145)
VANCOMYCIN TROUGH SERPL-MCNC: 14.2 UG/ML (ref 5–20)
WBC # BLD AUTO: 14.4 K/UL (ref 4.3–11.1)

## 2018-02-17 PROCEDURE — 77030008477 HC STYL SATN SLP COVD -A: Performed by: NURSE ANESTHETIST, CERTIFIED REGISTERED

## 2018-02-17 PROCEDURE — 74011000258 HC RX REV CODE- 258: Performed by: INTERNAL MEDICINE

## 2018-02-17 PROCEDURE — 36415 COLL VENOUS BLD VENIPUNCTURE: CPT | Performed by: INTERNAL MEDICINE

## 2018-02-17 PROCEDURE — 77030011640 HC PAD GRND REM COVD -A: Performed by: SURGERY

## 2018-02-17 PROCEDURE — 65270000029 HC RM PRIVATE

## 2018-02-17 PROCEDURE — P9045 ALBUMIN (HUMAN), 5%, 250 ML: HCPCS

## 2018-02-17 PROCEDURE — 77030019895 HC PCKNG STRP IODO -A: Performed by: SURGERY

## 2018-02-17 PROCEDURE — 77030031139 HC SUT VCRL2 J&J -A: Performed by: SURGERY

## 2018-02-17 PROCEDURE — 77030002916 HC SUT ETHLN J&J -A: Performed by: SURGERY

## 2018-02-17 PROCEDURE — 77030018836 HC SOL IRR NACL ICUM -A: Performed by: SURGERY

## 2018-02-17 PROCEDURE — 87186 SC STD MICRODIL/AGAR DIL: CPT | Performed by: SURGERY

## 2018-02-17 PROCEDURE — 94760 N-INVAS EAR/PLS OXIMETRY 1: CPT

## 2018-02-17 PROCEDURE — 74011250637 HC RX REV CODE- 250/637: Performed by: SURGERY

## 2018-02-17 PROCEDURE — 76060000033 HC ANESTHESIA 1 TO 1.5 HR: Performed by: SURGERY

## 2018-02-17 PROCEDURE — 74011250636 HC RX REV CODE- 250/636: Performed by: INTERNAL MEDICINE

## 2018-02-17 PROCEDURE — 74011250636 HC RX REV CODE- 250/636

## 2018-02-17 PROCEDURE — 77010033678 HC OXYGEN DAILY

## 2018-02-17 PROCEDURE — 74011000250 HC RX REV CODE- 250

## 2018-02-17 PROCEDURE — 80202 ASSAY OF VANCOMYCIN: CPT | Performed by: INTERNAL MEDICINE

## 2018-02-17 PROCEDURE — 87077 CULTURE AEROBIC IDENTIFY: CPT | Performed by: SURGERY

## 2018-02-17 PROCEDURE — 87205 SMEAR GRAM STAIN: CPT | Performed by: INTERNAL MEDICINE

## 2018-02-17 PROCEDURE — 77030008703 HC TU ET UNCUF COVD -A: Performed by: NURSE ANESTHETIST, CERTIFIED REGISTERED

## 2018-02-17 PROCEDURE — 74011000250 HC RX REV CODE- 250: Performed by: SURGERY

## 2018-02-17 PROCEDURE — 87205 SMEAR GRAM STAIN: CPT | Performed by: SURGERY

## 2018-02-17 PROCEDURE — 80048 BASIC METABOLIC PNL TOTAL CA: CPT | Performed by: INTERNAL MEDICINE

## 2018-02-17 PROCEDURE — 85025 COMPLETE CBC W/AUTO DIFF WBC: CPT | Performed by: INTERNAL MEDICINE

## 2018-02-17 PROCEDURE — 76210000016 HC OR PH I REC 1 TO 1.5 HR: Performed by: SURGERY

## 2018-02-17 PROCEDURE — 87075 CULTR BACTERIA EXCEPT BLOOD: CPT | Performed by: SURGERY

## 2018-02-17 PROCEDURE — 74011250636 HC RX REV CODE- 250/636: Performed by: ANESTHESIOLOGY

## 2018-02-17 PROCEDURE — 77030020782 HC GWN BAIR PAWS FLX 3M -B: Performed by: NURSE ANESTHETIST, CERTIFIED REGISTERED

## 2018-02-17 PROCEDURE — 76010000161 HC OR TIME 1 TO 1.5 HR INTENSV-TIER 1: Performed by: SURGERY

## 2018-02-17 PROCEDURE — 80076 HEPATIC FUNCTION PANEL: CPT | Performed by: INTERNAL MEDICINE

## 2018-02-17 RX ORDER — NEOSTIGMINE METHYLSULFATE 1 MG/ML
INJECTION INTRAVENOUS AS NEEDED
Status: DISCONTINUED | OUTPATIENT
Start: 2018-02-17 | End: 2018-02-17 | Stop reason: HOSPADM

## 2018-02-17 RX ORDER — NALOXONE HYDROCHLORIDE 0.4 MG/ML
0.1 INJECTION, SOLUTION INTRAMUSCULAR; INTRAVENOUS; SUBCUTANEOUS
Status: DISCONTINUED | OUTPATIENT
Start: 2018-02-17 | End: 2018-02-17 | Stop reason: HOSPADM

## 2018-02-17 RX ORDER — LIDOCAINE HYDROCHLORIDE 20 MG/ML
INJECTION, SOLUTION EPIDURAL; INFILTRATION; INTRACAUDAL; PERINEURAL AS NEEDED
Status: DISCONTINUED | OUTPATIENT
Start: 2018-02-17 | End: 2018-02-17 | Stop reason: HOSPADM

## 2018-02-17 RX ORDER — OXYCODONE AND ACETAMINOPHEN 5; 325 MG/1; MG/1
2 TABLET ORAL
Status: DISCONTINUED | OUTPATIENT
Start: 2018-02-17 | End: 2018-02-21 | Stop reason: HOSPADM

## 2018-02-17 RX ORDER — ALBUMIN HUMAN 50 G/1000ML
SOLUTION INTRAVENOUS AS NEEDED
Status: DISCONTINUED | OUTPATIENT
Start: 2018-02-17 | End: 2018-02-17 | Stop reason: HOSPADM

## 2018-02-17 RX ORDER — PROPOFOL 10 MG/ML
INJECTION, EMULSION INTRAVENOUS AS NEEDED
Status: DISCONTINUED | OUTPATIENT
Start: 2018-02-17 | End: 2018-02-17 | Stop reason: HOSPADM

## 2018-02-17 RX ORDER — ROCURONIUM BROMIDE 10 MG/ML
INJECTION, SOLUTION INTRAVENOUS AS NEEDED
Status: DISCONTINUED | OUTPATIENT
Start: 2018-02-17 | End: 2018-02-17 | Stop reason: HOSPADM

## 2018-02-17 RX ORDER — SODIUM CHLORIDE 0.9 % (FLUSH) 0.9 %
5-10 SYRINGE (ML) INJECTION AS NEEDED
Status: DISCONTINUED | OUTPATIENT
Start: 2018-02-17 | End: 2018-02-17 | Stop reason: HOSPADM

## 2018-02-17 RX ORDER — DEXAMETHASONE SODIUM PHOSPHATE 4 MG/ML
INJECTION, SOLUTION INTRA-ARTICULAR; INTRALESIONAL; INTRAMUSCULAR; INTRAVENOUS; SOFT TISSUE AS NEEDED
Status: DISCONTINUED | OUTPATIENT
Start: 2018-02-17 | End: 2018-02-17 | Stop reason: HOSPADM

## 2018-02-17 RX ORDER — NALBUPHINE HYDROCHLORIDE 10 MG/ML
5 INJECTION, SOLUTION INTRAMUSCULAR; INTRAVENOUS; SUBCUTANEOUS
Status: DISCONTINUED | OUTPATIENT
Start: 2018-02-17 | End: 2018-02-17 | Stop reason: HOSPADM

## 2018-02-17 RX ORDER — OXYCODONE AND ACETAMINOPHEN 5; 325 MG/1; MG/1
1 TABLET ORAL
Status: DISCONTINUED | OUTPATIENT
Start: 2018-02-17 | End: 2018-02-21 | Stop reason: HOSPADM

## 2018-02-17 RX ORDER — SODIUM CHLORIDE, SODIUM LACTATE, POTASSIUM CHLORIDE, CALCIUM CHLORIDE 600; 310; 30; 20 MG/100ML; MG/100ML; MG/100ML; MG/100ML
INJECTION, SOLUTION INTRAVENOUS
Status: DISCONTINUED | OUTPATIENT
Start: 2018-02-17 | End: 2018-02-17 | Stop reason: HOSPADM

## 2018-02-17 RX ORDER — VANCOMYCIN/0.9 % SOD CHLORIDE 1.5G/250ML
1500 PLASTIC BAG, INJECTION (ML) INTRAVENOUS EVERY 8 HOURS
Status: DISCONTINUED | OUTPATIENT
Start: 2018-02-17 | End: 2018-02-21 | Stop reason: HOSPADM

## 2018-02-17 RX ORDER — SUCCINYLCHOLINE CHLORIDE 20 MG/ML
INJECTION INTRAMUSCULAR; INTRAVENOUS AS NEEDED
Status: DISCONTINUED | OUTPATIENT
Start: 2018-02-17 | End: 2018-02-17 | Stop reason: HOSPADM

## 2018-02-17 RX ORDER — FENTANYL CITRATE 50 UG/ML
INJECTION, SOLUTION INTRAMUSCULAR; INTRAVENOUS AS NEEDED
Status: DISCONTINUED | OUTPATIENT
Start: 2018-02-17 | End: 2018-02-17 | Stop reason: HOSPADM

## 2018-02-17 RX ORDER — HYDROMORPHONE HYDROCHLORIDE 2 MG/ML
0.5 INJECTION, SOLUTION INTRAMUSCULAR; INTRAVENOUS; SUBCUTANEOUS
Status: COMPLETED | OUTPATIENT
Start: 2018-02-17 | End: 2018-02-17

## 2018-02-17 RX ORDER — ONDANSETRON 2 MG/ML
INJECTION INTRAMUSCULAR; INTRAVENOUS AS NEEDED
Status: DISCONTINUED | OUTPATIENT
Start: 2018-02-17 | End: 2018-02-17 | Stop reason: HOSPADM

## 2018-02-17 RX ORDER — FLUMAZENIL 0.1 MG/ML
0.2 INJECTION INTRAVENOUS
Status: DISCONTINUED | OUTPATIENT
Start: 2018-02-17 | End: 2018-02-17 | Stop reason: HOSPADM

## 2018-02-17 RX ORDER — VANCOMYCIN/0.9 % SOD CHLORIDE 1.5G/250ML
1500 PLASTIC BAG, INJECTION (ML) INTRAVENOUS EVERY 12 HOURS
Status: DISCONTINUED | OUTPATIENT
Start: 2018-02-17 | End: 2018-02-17

## 2018-02-17 RX ORDER — ETOMIDATE 2 MG/ML
INJECTION INTRAVENOUS AS NEEDED
Status: DISCONTINUED | OUTPATIENT
Start: 2018-02-17 | End: 2018-02-17 | Stop reason: HOSPADM

## 2018-02-17 RX ORDER — HYDRALAZINE HYDROCHLORIDE 20 MG/ML
20 INJECTION INTRAMUSCULAR; INTRAVENOUS
Status: DISCONTINUED | OUTPATIENT
Start: 2018-02-17 | End: 2018-02-21 | Stop reason: HOSPADM

## 2018-02-17 RX ORDER — GLYCOPYRROLATE 0.2 MG/ML
INJECTION INTRAMUSCULAR; INTRAVENOUS AS NEEDED
Status: DISCONTINUED | OUTPATIENT
Start: 2018-02-17 | End: 2018-02-17 | Stop reason: HOSPADM

## 2018-02-17 RX ORDER — OXYCODONE HYDROCHLORIDE 5 MG/1
5 TABLET ORAL
Status: DISCONTINUED | OUTPATIENT
Start: 2018-02-17 | End: 2018-02-17 | Stop reason: HOSPADM

## 2018-02-17 RX ORDER — BACITRACIN 50000 [IU]/1
INJECTION, POWDER, FOR SOLUTION INTRAMUSCULAR AS NEEDED
Status: DISCONTINUED | OUTPATIENT
Start: 2018-02-17 | End: 2018-02-17 | Stop reason: HOSPADM

## 2018-02-17 RX ADMIN — PIPERACILLIN SODIUM,TAZOBACTAM SODIUM 4.5 G: 4; .5 INJECTION, POWDER, FOR SOLUTION INTRAVENOUS at 04:48

## 2018-02-17 RX ADMIN — HYDROMORPHONE HYDROCHLORIDE 0.5 MG: 2 INJECTION, SOLUTION INTRAMUSCULAR; INTRAVENOUS; SUBCUTANEOUS at 10:22

## 2018-02-17 RX ADMIN — FENTANYL CITRATE 100 MCG: 50 INJECTION, SOLUTION INTRAMUSCULAR; INTRAVENOUS at 09:29

## 2018-02-17 RX ADMIN — PIPERACILLIN SODIUM,TAZOBACTAM SODIUM 4.5 G: 4; .5 INJECTION, POWDER, FOR SOLUTION INTRAVENOUS at 21:25

## 2018-02-17 RX ADMIN — HEPARIN SODIUM 5000 UNITS: 5000 INJECTION, SOLUTION INTRAVENOUS; SUBCUTANEOUS at 02:45

## 2018-02-17 RX ADMIN — HEPARIN SODIUM 5000 UNITS: 5000 INJECTION, SOLUTION INTRAVENOUS; SUBCUTANEOUS at 19:29

## 2018-02-17 RX ADMIN — ROCURONIUM BROMIDE 20 MG: 10 INJECTION, SOLUTION INTRAVENOUS at 09:12

## 2018-02-17 RX ADMIN — PROPOFOL 50 MG: 10 INJECTION, EMULSION INTRAVENOUS at 09:15

## 2018-02-17 RX ADMIN — LIDOCAINE HYDROCHLORIDE 60 MG: 20 INJECTION, SOLUTION EPIDURAL; INFILTRATION; INTRACAUDAL; PERINEURAL at 08:55

## 2018-02-17 RX ADMIN — ALBUMIN HUMAN 25 G: 50 SOLUTION INTRAVENOUS at 09:18

## 2018-02-17 RX ADMIN — OXYCODONE HYDROCHLORIDE AND ACETAMINOPHEN 2 TABLET: 5; 325 TABLET ORAL at 11:41

## 2018-02-17 RX ADMIN — HYDROMORPHONE HYDROCHLORIDE 0.5 MG: 2 INJECTION, SOLUTION INTRAMUSCULAR; INTRAVENOUS; SUBCUTANEOUS at 10:27

## 2018-02-17 RX ADMIN — HYDROMORPHONE HYDROCHLORIDE 0.5 MG: 2 INJECTION, SOLUTION INTRAMUSCULAR; INTRAVENOUS; SUBCUTANEOUS at 10:32

## 2018-02-17 RX ADMIN — GLYCOPYRROLATE 0.2 MG: 0.2 INJECTION INTRAMUSCULAR; INTRAVENOUS at 09:43

## 2018-02-17 RX ADMIN — VANCOMYCIN HYDROCHLORIDE 1500 MG: 10 INJECTION, POWDER, LYOPHILIZED, FOR SOLUTION INTRAVENOUS at 14:11

## 2018-02-17 RX ADMIN — VANCOMYCIN HYDROCHLORIDE 1500 MG: 10 INJECTION, POWDER, LYOPHILIZED, FOR SOLUTION INTRAVENOUS at 21:27

## 2018-02-17 RX ADMIN — VANCOMYCIN HYDROCHLORIDE 1250 MG: 10 INJECTION, POWDER, LYOPHILIZED, FOR SOLUTION INTRAVENOUS at 02:33

## 2018-02-17 RX ADMIN — HYDROMORPHONE HYDROCHLORIDE 0.5 MG: 2 INJECTION, SOLUTION INTRAMUSCULAR; INTRAVENOUS; SUBCUTANEOUS at 10:37

## 2018-02-17 RX ADMIN — ONDANSETRON 4 MG: 2 INJECTION INTRAMUSCULAR; INTRAVENOUS at 09:09

## 2018-02-17 RX ADMIN — HEPARIN SODIUM 5000 UNITS: 5000 INJECTION, SOLUTION INTRAVENOUS; SUBCUTANEOUS at 11:20

## 2018-02-17 RX ADMIN — SUCCINYLCHOLINE CHLORIDE 140 MG: 20 INJECTION INTRAMUSCULAR; INTRAVENOUS at 08:55

## 2018-02-17 RX ADMIN — PIPERACILLIN SODIUM,TAZOBACTAM SODIUM 4.5 G: 4; .5 INJECTION, POWDER, FOR SOLUTION INTRAVENOUS at 14:11

## 2018-02-17 RX ADMIN — NEOSTIGMINE METHYLSULFATE 3 MG: 1 INJECTION INTRAVENOUS at 09:43

## 2018-02-17 RX ADMIN — DEXAMETHASONE SODIUM PHOSPHATE 10 MG: 4 INJECTION, SOLUTION INTRA-ARTICULAR; INTRALESIONAL; INTRAMUSCULAR; INTRAVENOUS; SOFT TISSUE at 09:09

## 2018-02-17 RX ADMIN — PROPOFOL 50 MG: 10 INJECTION, EMULSION INTRAVENOUS at 09:12

## 2018-02-17 RX ADMIN — FENTANYL CITRATE 100 MCG: 50 INJECTION, SOLUTION INTRAMUSCULAR; INTRAVENOUS at 08:55

## 2018-02-17 RX ADMIN — ETOMIDATE 20 MG: 2 INJECTION INTRAVENOUS at 08:55

## 2018-02-17 RX ADMIN — ROCURONIUM BROMIDE 5 MG: 10 INJECTION, SOLUTION INTRAVENOUS at 08:55

## 2018-02-17 RX ADMIN — SODIUM CHLORIDE, SODIUM LACTATE, POTASSIUM CHLORIDE, CALCIUM CHLORIDE: 600; 310; 30; 20 INJECTION, SOLUTION INTRAVENOUS at 08:43

## 2018-02-17 NOTE — CONSULTS
[address] [facility]    Nunu Olivia  MR#: [id number]  : 1955  ACCOUNT #: [de-identified]   DATE OF SERVICE: 2018    REASON FOR CONSULTATION:  Multiple abscesses in the thigh and calf, as well as an infected left knee. HISTORY OF PRESENT ILLNESS:  This is a 12-year-old male admitted by the hospitalist service on 2018. He reports a 2-week history of left knee and left lower extremity pain. He says the left leg became very edematous 1 week ago. He denies any trauma to the left lower extremity, any falls, scratches, insect bites, stings. He came into the hospital.  He was seen by Dr. Gilda David of the orthopedic service. In the morning of 2018 they tapped his left knee. ID was then consulted due to his septic picture. They did an MRI. It shows multiple abscesses in the left thigh around the knee and in the deeper tissue. Surgery was consulted generically. I was called to see the patient. I asked that since orthopedics had already seen the patient and they should be capable of draining abscesses in the thigh as part of their general training that they see the patient. Dr. Edna Hagen was on-call and he looked at the MRI, said he did not feel comfortable doing this; he would just take care of the knee. I am seeing the patient for care of these abscesses in the thigh area and around the knee. PAST MEDICAL HISTORY:  He denied diabetes, hypertension, coronary artery disease or hyperlipidemia. PAST SURGICAL HISTORY:  He has had back surgery. SOCIAL HISTORY:  He is a nonsmoker. Denies alcohol use. HOME MEDICATIONS:  Tylenol No. 3 and Neurontin. ALLERGIES:  NO KNOWN DRUG ALLERGIES. PHYSICAL EXAMINATION:  VITAL SIGNS:  Pulse of 102, BP is 152/86, temp is 98.9. GENERAL APPEARANCE:  A well-developed, well-nourished, elderly  male sitting on the edge of the bed in room 356.   HEART:  Sinus tachycardia, rate approximately 104.  LUNGS:  Clear, no rales, rhonchi or wheezing. ABDOMEN:  Soft, nontender, nondistended. EXTREMITIES:  Left lower extremity and right lower extremity were both edematous. Left lower extremity was markedly edematous. There was marked swelling around the knee and medial thigh area. I palpated his entire leg, could not palpate distinct fluctuant area. The MRI results show multiloculated fluid collections within the left lower extremity, most compatible with abscess. A large joint effusion is present with enhancement concerning for septic arthritis. There is a large right joint effusion as well. There are also some smaller ones in the calf area. ASSESSMENT:  Multiple abscesses and infected left lower extremity. PLAN:  To take the patient to the OR, open these up, drain and debride any tissue that may require debridement. I went through the risks of bleeding, infection, anesthesia, potential for further surgery, potential for amputation, and even mortality with this type of problem. The patient understood.       MD Antonio Olivia / Gilda Urrutia  D: 02/17/2018 06:03     T: 02/17/2018 11:39  JOB #: [id number]

## 2018-02-17 NOTE — PROGRESS NOTES
TRANSFER - OUT REPORT:    Verbal report given to Alejandro RN(name) on Gudelia Hatch  being transferred to Preop(unit) for ordered procedure       Report consisted of patients Situation, Background, Assessment and   Recommendations(SBAR). Information from the following report(s) SBAR and Kardex was reviewed with the receiving nurse. Lines:   Peripheral IV 02/13/18 Right Antecubital (Active)   Site Assessment Clean, dry, & intact 2/17/2018  7:04 AM   Phlebitis Assessment 0 2/17/2018  4:57 AM   Infiltration Assessment 0 2/17/2018  4:57 AM   Dressing Status Clean, dry, & intact 2/17/2018  4:57 AM   Dressing Type Transparent;Tape 2/17/2018  4:57 AM   Hub Color/Line Status Infusing 2/17/2018  4:57 AM        Opportunity for questions and clarification was provided.

## 2018-02-17 NOTE — BRIEF OP NOTE
BRIEF OPERATIVE NOTE    Date of Procedure: 2/17/2018   Preoperative Diagnosis: LLE ABSCESSES  Postoperative Diagnosis: PERIARTICULAR AND SUPRAARTICULAR ABSCESSES  Procedure(s):INCISION AND DRAINAGE OF LEFT LOWER EXTREMITY PERIARTICULAR AND SUPRAARTICULAR ABSCESSES  Surgeon(s) and Role:     * Lexie Linares MD - Primary              Assistant Staff: None      Surgical Staff:  Circ-1: Rios Adan RN  Circ-2: Maria Elena Martinez RN  Scrub Tech-1: Randy Cordova  No case tracking events are documented in the log. Anesthesia: Spinal   Estimated Blood Loss: 25 cc's  Specimens:   ID Type Source Tests Collected by Time Destination   1 : left leg #1 Wound Knee  CULTURE, ANAEROBIC Lexie Linares MD 2/17/2018 3406 Microbiology   2 : left leg #2 Wound  CULTURE, WOUND W GRAM STAIN Lexie Linares MD 2/17/2018 2833 Microbiology      Findings: See dictated note. Complications: None.   Implants: * No implants in log *

## 2018-02-17 NOTE — PROGRESS NOTES
Progress Note    Patient: Shaun Montoya MRN: [id number]  SSN: [id number]    YOB: 1955  Age: 57 y.o. Sex: male      Admit Date: 2/13/2018    LOS: 4 days     Subjective:   Patient examined at bedside. He underwent left knee drainage of effusion, left leg I&D of supraarticular and periarticular abscesses. He continues on vancomycin. ROS: all pertinent findings described in my note. Objective:     Vitals:    02/16/18 1820 02/16/18 2135 02/16/18 2150 02/17/18 0456   BP: 153/83 158/85  152/86   Pulse: (!) 105 (!) 107 (!) 102 (!) 102   Resp: 20 22 20   Temp: 99.5 °F (37.5 °C)  100 °F (37.8 °C) 98.9 °F (37.2 °C)   SpO2: 96% 99%  98%   Weight:       Height:            Intake and Output:  Current Shift: 02/17 0701 - 02/17 1900  In: -   Out: 300 [Urine:300]  Last three shifts: 02/15 1901 - 02/17 0700  In: -   Out: 1925 [Urine:1925]    Physical Exam:   GENERAL: alert, cooperative, no distress, appears stated age  EYE: negative  LYMPHATIC: Cervical, supraclavicular, and axillary nodes normal.   THROAT & NECK: normal and no erythema or exudates noted. LUNG: clear to auscultation bilaterally  HEART: regular rate and rhythm, S1, S2 normal, no murmur, click, rub or gallop  ABDOMEN: soft, non-tender. Bowel sounds normal. No masses,  no organomegaly  EXTREMITIES:  LLE: covered with sterile gauze. Pulses present   SKIN: Normal.  NEUROLOGIC: negative  PSYCHIATRIC: non focal    Lab/Data Review: All lab results for the last 24 hours reviewed. Assessment:     Active Problems:    Leukocytosis (2/13/2018)      Hypoalbuminemia (2/13/2018)      Anemia (2/13/2018)      Left leg cellulitis (2/13/2018)      Abnormal transaminases (2/13/2018)      Thrombocytosis ([address] (2/13/2018)      Cellulitis (2/15/2018)        Plan: 1. Lef leg with multiloculated abscess / large left knee joint effusion concerning for septic arthritis/ MRSA bacteremia: S/P I&D periarticular and supraarticular abscesses-  ECHO negative for vegetations- continue vancomycin- f/u blood cultures- ID F/U- ortho and general surgery consult-      2. Thrombocytosis: possible to stress / infection- monitor daily     3. Hypoalbuminemia: s/p albumin iv total of 4 doses and  lasix     4. Transaminitis and elevated alk phosphatase: liver US was normal- hepatitis panel negative- likely due to bacteremia- monitor     5. Anemia: unknown if SEGUNDO / chronic anemia: low iron stores- may need iron transfusion once his infection is clear as outpatient     6. Obesity: counseling      -DVT ppx: heparin, GCS over right leg     Full code    Signed By: Terrence Graham MD     February 17, 2018

## 2018-02-17 NOTE — PROGRESS NOTES
Paged by Dr Fabi Ernst to discuss the need for surgical eval. Spoke with him and he asked to check with Ortho if they would drain the knee as well as perform I&D of the abscess. Spoke with Ortho on call Dr. Deysi Alanis and examined the pt with him, reviewed MRI together as well. He is concerned about possible necrotizing fascitis and a large area in thigh and leg is infected with fluid collections and abscesses. Pt is in discomfort and both his knees have fluid. Dr Deysi Alanis is going to tap both knees STAT and resend fluid for analysis with cultures for concern of septic arthritis, but defers I&D to General surgery. Called back Dr. Fabi Ernst and discussed ortho input. Since pt in not NPO, he asked to make pt NPO after midnight and he will see pt in am and take pt to the OR. This plan was d/w patient's RN.

## 2018-02-17 NOTE — PROGRESS NOTES
Pharmacokinetic Consult to Pharmacist    Terra Fate is a 57 y.o. male being treated for left leg cellulitis with zosyn and vancomycin. Height: 5' 8\" (172.7 cm)  Weight: 113.4 kg (250 lb)  Lab Results   Component Value Date/Time    BUN 11 02/17/2018 06:13 AM    Creatinine 0.93 02/17/2018 06:13 AM    WBC 14.4 (H) 02/17/2018 06:13 AM      Estimated Creatinine Clearance: 98.1 mL/min (based on Cr of 0.93). CULTURES:  2/13 BC x 2  :  MRSA from one tube and SA from second tube  2/14 BC x 2 : SA  2/16 BC x 2 : pending  2/16 knee spiriate x 3 : pending  2/17 BX x 2 : in process    Lab Results   Component Value Date/Time    Vancomycin,trough 14.2 02/17/2018 01:00 AM       Day 5 of vancomycin. Goal trough is 15-20. We will increase the dose to vancomycin 1500mg q8h.   We will continue to follow the patient and adjust the dose as necessary per guidelines    Thank you,  Sparkle William, PharmD

## 2018-02-17 NOTE — PROGRESS NOTES
I was called to see this patient about his knee. I did review Dr. Modesto Linn notes. I did tap his left knee tonight and did get pus. It will be sent for culture. I   also aspirated the right knee and got clear fluid. It does not appear to be infected. The fluid is sent for culture of the right knee as well. I did talk with Dr. Mindy Urias. I will plan to scope the knee and wash out the joint. I will do this tomorrow. He will do the drainage of the thigh and calf. There are extensive deep abscesses. Orthopedics was consulted  about the knee not the thigh. I can do the knee but am not comfortable with the deep soft tissue abscesses because of the anatomic location. I will be glad to help in any way I can. I do not want to open the knee joint. The scope is the best way to go.

## 2018-02-17 NOTE — PROGRESS NOTES
Patient received from PACU to room 356  via bed . Patient alert & oriented, respirations easy & regular with O2 @ 3L via nasal cannula sats on 97%. Left thigh with dressing wrapped in bandage intact . Call light within reach, instructed to call for assistance as needed. Family members at bedside.

## 2018-02-17 NOTE — OP NOTES
[facility] REPORT    Tory Alamo  MR#: [id number]  : 1955  ACCOUNT #: [de-identified]   DATE OF SERVICE: 2018    PREOPERATIVE DIAGNOSIS:  Left lower extremity abscess. POSTOPERATIVE DIAGNOSIS:  Left lower extremity periarticular and supraarticular abscesses. PROCEDURE PERFORMED:  Incision and drainage of left lower extremity periarticular and supraarticular abscesses. SURGEON:  Edvin Mcmahon MD     ASSISTANT:  None     ANESTHESIA:  General endotracheal anesthesia with Dr. Chato Nevarez.      ESTIMATED BLOOD LOSS: 25 mL. SPECIMENS REMOVED:  We took 4 sets of cultures from the abscess fluid. COMPLICATIONS:  None. IMPLANTS:  None. HISTORY:  A 78-year-old male who I was asked to see on the evening of 2018 at approximately 7:00 p.m. The patient is a 78-year-old gentleman who reported a 2 week history of left lower extremity pain, now with pain in the right lower extremity as well. Dr. Conor Saez of orthopedics had been consulted in the morning of 2018 and had done a consult where he tapped the knee. The patient had cellulitis and swelling of the lower extremity. Infectious disease was consulted and they recommended an MRI. An MRI was finished at 1645 and it showed multiple fluid collections within the deep muscles of the left leg, particularly along the anterior and lateral thigh. There was large joint effusion with enhancement as well as enhancement of the joint space concerning for septic arthritis. There was also a large right joint effusion. This was all concerning for possible myositis and a surgery consult was called for. I called the floor and said that this appeared to be involving the left knee joint and was an orthopedic problem, that the abscesses were just superior to the knee joint and involved the knee joint as well.   That was my interpretation of the MRI and Dr. Erick John, who was on call for orthopedics came in, saw the patient, reviewed the MRI and felt that this was much too complicated for him to take care of. I asked him what he did not feel comfortable taking care of and he said well there were abscesses in the muscle tissue. Once again, I said these are just superior to the knee joint and involve the knee joint as well. All of those are orthopedic things plus Orthopedics does deal with the musculoskeletal system, muscles being part of the musculoskeletal system. He felt that all he wanted to do was a knee arthroscopy and he would leave everything else up to me. I said that I did not think that this was a general surgery problem and he deferred saying that he would not do anything else. I felt the patient needed to be cared for. Therefore, I came in the early morning hours of 02/17, went over drainage of these abscesses the potential for having to do the same surgery over again, potential for amputation and even for mortality if the patient becomes septic and develops multisystem organ failure. The patient seemed to understand and agreed. I did speak to him at about 5:30 in the morning. There were no family members or anyone present to go over this with. DESCRIPTION OF PROCEDURE:  The patient was seen in the preoperative area, his left leg was marked with a yes indicating we would operate on the left leg. He was then brought to the room #1 at [address] [place] [address] on Saturday morning 02/17/2018. He was placed on the operating table in supine position. General endotracheal anesthesia was administered without complication. He was already on Zosyn and vancomycin. This served as his antibiotic coverage. The patient's left leg was prepped and draped in the usual sterile manner. I did a timeout identifying patient, surgeon, procedure and his birthdate of 1955.   At this point, I made a longitudinal incision on the left lateral aspect of the left lower extremity from the level of the superior aspect of the patella all the way up to the mid-thigh. Incision was carried to the skin and soft tissue. I found the fascia for the vastus lateralis and incised this. I mobilized the vastus lateralis laterally and came down into a plane between the vastus lateralis and the vastus medialis. I then entered this space and the joint space of the left knee. On penetrating this capsule, turbid light yellow fluid shot into the air approximately 1 foot as it was under marked pressure. We quickly suctioned some of this and then saved enough so that we could do multiple cultures of it. We took 4 culture swabs of this which were sent for aerobic, anaerobic, Gram stain. All this was sent to microbiology. I then opened this capsule up. This was a septic knee. It involved the knee joint. I could put my hand into the area between the patella and the tibia and fibula. I could put my fingertip in through the gap between the tibia and the femur. I irrigated this whole area with about a liter of saline. Dr. Ana Hermosillo, who had planned to do a knee arthroscopy was called into the room. He looked at it and said there was nothing else that he needed to do. I told him this was a septic left knee and I said that I thought that this should have been his problem to take care of. He once again reiterated saying that he thanked me for taking care of the problem and left the room. At this point, I packed the entire area with Betadine soaked Kerlix. Then ABD pads were placed over it and it was wrapped lightly with an Ace wrap. This is obviously an orthopedic problem. I do not have much experience in this. did not feel terribly comfortable taking care of this, but felt that this patient needed to have something done and Dr. Ana Hermosillo did not seem to either be willing or capable of taking care of this problem. The patient was extubated and brought to recovery room in stable condition. He will be on antibiotics.   He is followed by the hospitalist service and infectious disease service, orthopedics and the general surgery service. I will see the patient again on 02/18/2017.       MD CHRISTINA Romero / NAZIA  D: 02/17/2018 09:56     T: 02/17/2018 17:36  JOB #: [id number]

## 2018-02-17 NOTE — PROGRESS NOTES
Shift assessment complete. HR regular, RR even and unlabored. Abd soft, positive bowel sounds. IVF infusing without complications. All needs met at this time. Will monitor with hourly rounds.

## 2018-02-17 NOTE — PERIOP NOTES
TRANSFER - OUT REPORT:    Verbal report given to receiving nurse Robyn(name) on Patricia South  being transferred to [facility](unit) for routine progression of care       Report consisted of patients Situation, Background, Assessment and   Recommendations(SBAR). Information from the following report(s) OR Summary, Procedure Summary, Intake/Output and MAR was reviewed with the receiving nurse. Opportunity for questions and clarification was provided.       Patient transported with:   O2 @ 3 liters  Registered Nurse

## 2018-02-17 NOTE — PROGRESS NOTES
TRANSFER - IN REPORT:    Verbal report received from Shashank(name) on Alison Montague  being received from [facility]) for routine post - op      Report consisted of patients Situation, Background, Assessment and   Recommendations(SBAR). Information from the following report(s) SBAR and Kardex was reviewed with the receiving nurse. Opportunity for questions and clarification was provided. Assessment completed upon patients arrival to unit and care assumed.

## 2018-02-17 NOTE — ANESTHESIA POSTPROCEDURE EVALUATION
Post-Anesthesia Evaluation and Assessment    Patient: Brielle Smith MRN: [id number]  SSN: [id number]    YOB: 1955  Age: 57 y.o. Sex: male       Cardiovascular Function/Vital Signs  Visit Vitals    /89    Pulse (!) 109    Temp 36.3 °C (97.3 °F)    Resp 15    Ht 5' 8\" (1.727 m)    Wt 113.4 kg (250 lb)    SpO2 96%    BMI 38.01 kg/m2       Patient is status post general anesthesia for Procedure(s):  INCISION AND DRAINAGE LOWER EXTREMITY. Nausea/Vomiting: None    Postoperative hydration reviewed and adequate. Pain:  Pain Scale 1: Numeric (0 - 10) (02/17/18 0959)  Pain Intensity 1: 0 (02/17/18 0959)   Managed    Neurological Status:   Neuro (WDL): Within Defined Limits (02/17/18 0959)  Neuro  Neurologic State: Drowsy (02/17/18 0113)  Orientation Level: Oriented to person (02/17/18 6529)  Cognition: Appropriate for age attention/concentration (02/17/18 0704)  Speech: Clear (02/17/18 0704)  LUE Motor Response: Purposeful (02/17/18 0704)  LLE Motor Response: Purposeful (02/17/18 0704)  RUE Motor Response: Purposeful (02/17/18 0704)  RLE Motor Response: Purposeful (02/17/18 0704)   At baseline    Mental Status and Level of Consciousness: Arousable    Pulmonary Status:   O2 Device: Nasal cannula (02/17/18 1014)   Adequate oxygenation and airway patent    Complications related to anesthesia: None    Post-anesthesia assessment completed.  No concerns    Signed By: Sully Patricio MD     February 17, 2018

## 2018-02-17 NOTE — PROGRESS NOTES
Change of shift report received from outgoing nurse, Kylee Fermin RN. Pt awake, sitting up in bed, resp easy & regular. NPO maintained. Family member at bedside. Call light within reach, pt instructed to call for assistance as needed.

## 2018-02-17 NOTE — PROGRESS NOTES
The left knee and thigh were drained by Dr. Raulito Batres. This should help him a great deal. The right knee is painful and swollen still. I did another aspiration of the right knee  but the fluid is clear and does not appear to be infected. I did send it for culture as well. There was about 50 ml of fluid. Procedure: The right knee is aspirated under sterile technique.

## 2018-02-17 NOTE — ANESTHESIA PREPROCEDURE EVALUATION
Anesthetic History   No history of anesthetic complications            Review of Systems / Medical History  Patient summary reviewed and pertinent labs reviewed    Pulmonary  Within defined limits                 Neuro/Psych   Within defined limits           Cardiovascular  Within defined limits                Exercise tolerance: >4 METS  Comments: Denies recent CP, SOB or Palpitations   GI/Hepatic/Renal                Endo/Other        Obesity and anemia     Other Findings   Comments: Thrombocytosis    Elevated LFTs    Hypoalbunemic - likely malnourished. Has been fasting for past 3 weeks for religous reasons.     Sepsis - likely from knee or leg abscess         Physical Exam    Airway  Mallampati: I  TM Distance: > 6 cm  Neck ROM: normal range of motion   Mouth opening: Normal     Cardiovascular    Rhythm: regular  Rate: abnormal        Comments: Tachycardic Dental  No notable dental hx       Pulmonary  Breath sounds clear to auscultation               Abdominal  GI exam deferred       Other Findings            Anesthetic Plan    ASA: 3, emergent  Anesthesia type: general          Induction: Intravenous  Anesthetic plan and risks discussed with: Patient

## 2018-02-17 NOTE — PROGRESS NOTES
Surgery  Called to see the patient who was seen by Dr. Tiana Foster of orthopedic surgery this morning about an infected left leg. The patient appears to have an infected left knee and abscesses in the left thigh. I asked Dr. Maxine Ramos to call the orthopedic surgeons back as they had seen the patient earlier today. Certainly an orthopedic surgeon who deals with extremities every day can drain abscesses in the leg. This is not solely a problem for general surgery. Will see the patient if orthopedics refuses.   [place], MD

## 2018-02-17 NOTE — PROGRESS NOTES
Surgery  I spoke with Dr. Tresa Gordon of orthopedics who said he does not feel comfortable draining the abscesses in the left extremity. He would like to scope the left knee which he can do after I am through with my part.

## 2018-02-17 NOTE — PROGRESS NOTES
Surgery  I spoke to Dr. Scarlett Veronica who said orthopedics does not feel comfortable with this problem. He has already eaten dinner. Will make him NPO, type and screen him, add Zosyn and take him to the OR tomorrow morning.    Ban Fernández MD.

## 2018-02-18 LAB
ALBUMIN SERPL-MCNC: 2.5 G/DL (ref 3.2–4.6)
ALBUMIN/GLOB SERPL: 0.4 {RATIO} (ref 1.2–3.5)
ALP SERPL-CCNC: 176 U/L (ref 50–136)
ALT SERPL-CCNC: 120 U/L (ref 12–65)
ANION GAP SERPL CALC-SCNC: 10 MMOL/L (ref 7–16)
AST SERPL-CCNC: 73 U/L (ref 15–37)
BASOPHILS # BLD: 0 K/UL (ref 0–0.2)
BASOPHILS NFR BLD: 0 % (ref 0–2)
BILIRUB DIRECT SERPL-MCNC: 0.3 MG/DL
BILIRUB SERPL-MCNC: 0.8 MG/DL (ref 0.2–1.1)
BUN SERPL-MCNC: 13 MG/DL (ref 8–23)
CALCIUM SERPL-MCNC: 10.5 MG/DL (ref 8.3–10.4)
CHLORIDE SERPL-SCNC: 101 MMOL/L (ref 98–107)
CO2 SERPL-SCNC: 26 MMOL/L (ref 21–32)
CREAT SERPL-MCNC: 0.94 MG/DL (ref 0.8–1.5)
DIFFERENTIAL METHOD BLD: ABNORMAL
EOSINOPHIL # BLD: 0.1 K/UL (ref 0–0.8)
EOSINOPHIL NFR BLD: 0 % (ref 0.5–7.8)
ERYTHROCYTE [DISTWIDTH] IN BLOOD BY AUTOMATED COUNT: 16.5 % (ref 11.9–14.6)
GLOBULIN SER CALC-MCNC: 5.6 G/DL (ref 2.3–3.5)
GLUCOSE SERPL-MCNC: 98 MG/DL (ref 65–100)
HCT VFR BLD AUTO: 26.6 % (ref 41.1–50.3)
HGB BLD-MCNC: 8.5 G/DL (ref 13.6–17.2)
IMM GRANULOCYTES # BLD: 0.4 K/UL (ref 0–0.5)
IMM GRANULOCYTES NFR BLD AUTO: 2 % (ref 0–5)
LYMPHOCYTES # BLD: 2.3 K/UL (ref 0.5–4.6)
LYMPHOCYTES NFR BLD: 13 % (ref 13–44)
MCH RBC QN AUTO: 25.4 PG (ref 26.1–32.9)
MCHC RBC AUTO-ENTMCNC: 32 G/DL (ref 31.4–35)
MCV RBC AUTO: 79.4 FL (ref 79.6–97.8)
MONOCYTES # BLD: 1.5 K/UL (ref 0.1–1.3)
MONOCYTES NFR BLD: 8 % (ref 4–12)
NEUTS SEG # BLD: 13.5 K/UL (ref 1.7–8.2)
NEUTS SEG NFR BLD: 77 % (ref 43–78)
PLATELET # BLD AUTO: 736 K/UL (ref 150–450)
PMV BLD AUTO: 9.5 FL (ref 10.8–14.1)
POTASSIUM SERPL-SCNC: 4 MMOL/L (ref 3.5–5.1)
PROT SERPL-MCNC: 8.1 G/DL (ref 6.3–8.2)
RBC # BLD AUTO: 3.35 M/UL (ref 4.23–5.67)
SODIUM SERPL-SCNC: 137 MMOL/L (ref 136–145)
VANCOMYCIN TROUGH SERPL-MCNC: 17.5 UG/ML (ref 5–20)
WBC # BLD AUTO: 17.8 K/UL (ref 4.3–11.1)

## 2018-02-18 PROCEDURE — 74011250637 HC RX REV CODE- 250/637: Performed by: SURGERY

## 2018-02-18 PROCEDURE — 74011250637 HC RX REV CODE- 250/637: Performed by: INTERNAL MEDICINE

## 2018-02-18 PROCEDURE — 77030018836 HC SOL IRR NACL ICUM -A

## 2018-02-18 PROCEDURE — 74011250636 HC RX REV CODE- 250/636: Performed by: INTERNAL MEDICINE

## 2018-02-18 PROCEDURE — 86580 TB INTRADERMAL TEST: CPT | Performed by: INTERNAL MEDICINE

## 2018-02-18 PROCEDURE — 80076 HEPATIC FUNCTION PANEL: CPT | Performed by: SURGERY

## 2018-02-18 PROCEDURE — 65270000029 HC RM PRIVATE

## 2018-02-18 PROCEDURE — 36415 COLL VENOUS BLD VENIPUNCTURE: CPT | Performed by: SURGERY

## 2018-02-18 PROCEDURE — 80202 ASSAY OF VANCOMYCIN: CPT | Performed by: INTERNAL MEDICINE

## 2018-02-18 PROCEDURE — 85025 COMPLETE CBC W/AUTO DIFF WBC: CPT | Performed by: SURGERY

## 2018-02-18 PROCEDURE — 74011000258 HC RX REV CODE- 258: Performed by: INTERNAL MEDICINE

## 2018-02-18 PROCEDURE — 74011000302 HC RX REV CODE- 302: Performed by: INTERNAL MEDICINE

## 2018-02-18 PROCEDURE — 80048 BASIC METABOLIC PNL TOTAL CA: CPT | Performed by: SURGERY

## 2018-02-18 RX ADMIN — TUBERCULIN PURIFIED PROTEIN DERIVATIVE 5 UNITS: 5 INJECTION, SOLUTION INTRADERMAL at 18:39

## 2018-02-18 RX ADMIN — OXYCODONE HYDROCHLORIDE AND ACETAMINOPHEN 2 TABLET: 5; 325 TABLET ORAL at 13:24

## 2018-02-18 RX ADMIN — VANCOMYCIN HYDROCHLORIDE 1500 MG: 10 INJECTION, POWDER, LYOPHILIZED, FOR SOLUTION INTRAVENOUS at 20:30

## 2018-02-18 RX ADMIN — ACETAMINOPHEN 650 MG: 325 TABLET ORAL at 23:13

## 2018-02-18 RX ADMIN — OXYCODONE HYDROCHLORIDE AND ACETAMINOPHEN 1 TABLET: 5; 325 TABLET ORAL at 20:45

## 2018-02-18 RX ADMIN — VANCOMYCIN HYDROCHLORIDE 1500 MG: 10 INJECTION, POWDER, LYOPHILIZED, FOR SOLUTION INTRAVENOUS at 04:33

## 2018-02-18 RX ADMIN — PIPERACILLIN SODIUM,TAZOBACTAM SODIUM 4.5 G: 4; .5 INJECTION, POWDER, FOR SOLUTION INTRAVENOUS at 13:22

## 2018-02-18 RX ADMIN — HEPARIN SODIUM 5000 UNITS: 5000 INJECTION, SOLUTION INTRAVENOUS; SUBCUTANEOUS at 20:28

## 2018-02-18 RX ADMIN — VANCOMYCIN HYDROCHLORIDE 1500 MG: 10 INJECTION, POWDER, LYOPHILIZED, FOR SOLUTION INTRAVENOUS at 13:21

## 2018-02-18 RX ADMIN — HEPARIN SODIUM 5000 UNITS: 5000 INJECTION, SOLUTION INTRAVENOUS; SUBCUTANEOUS at 11:03

## 2018-02-18 RX ADMIN — HEPARIN SODIUM 5000 UNITS: 5000 INJECTION, SOLUTION INTRAVENOUS; SUBCUTANEOUS at 02:43

## 2018-02-18 RX ADMIN — HYDROMORPHONE HYDROCHLORIDE 0.5 MG: 2 INJECTION, SOLUTION INTRAMUSCULAR; INTRAVENOUS; SUBCUTANEOUS at 09:16

## 2018-02-18 RX ADMIN — PIPERACILLIN SODIUM,TAZOBACTAM SODIUM 4.5 G: 4; .5 INJECTION, POWDER, FOR SOLUTION INTRAVENOUS at 04:33

## 2018-02-18 RX ADMIN — OXYCODONE HYDROCHLORIDE AND ACETAMINOPHEN 2 TABLET: 5; 325 TABLET ORAL at 06:42

## 2018-02-18 NOTE — PROGRESS NOTES
He is feeling much better following the surgery By Dr. Elma Thomas yesterday. The right knee is much better as well. The culture so far for the right   knee is negative so far. The right knee is not infected I believe, but he does have significant arthritis. I could do an injection for the right knee later if he   would like. He said he is planning to stay in the area with his family for a while. The effusion of the right knee is significantly down today. I will check him again tomorrow.

## 2018-02-18 NOTE — PROGRESS NOTES
Assessment done via doc flow sheet. Pt up in chair at bedside, alert & oriented x3, resp easy & regular, lungs CTA bilaterally. Left thigh with bandage dressing intact, old drainage noted. Denies pain at this time. Call light within reach, pt instructed to call for assistance as needed.

## 2018-02-18 NOTE — PROGRESS NOTES
Dr. Rachel Cabrera here requesting pain med to be given to patient prior to dressing change. Dilauidid 0.5mg given slow iv push . Pain 3/10 at this time. Patient sitting up in chair.

## 2018-02-18 NOTE — PROGRESS NOTES
Progress Note    Patient: Lj Vanegas MRN: [id number]  SSN: [id number]    YOB: 1955  Age: 57 y.o. Sex: male      Admit Date: 2/13/2018    LOS: 5 days     Subjective:   Patient examined at bedside. He feels better, said his left thigh is much improved and less edematous. No fever, but noted with leukocytosis and tachycardia. Wound dressing changed today. ROS: all pertinent findings described in my note. Objective:     Vitals:    02/17/18 1538 02/17/18 2052 02/17/18 2333 02/18/18 0411   BP: 118/72  138/76 138/81   Pulse: (!) 109  (!) 102 96   Resp: 18 18 20   Temp: 98 °F (36.7 °C)  98.6 °F (37 °C) 97.6 °F (36.4 °C)   SpO2: 97% 98% 98% 100%   Weight:       Height:            Intake and Output:  Current Shift:    Last three shifts: 02/16 1901 - 02/18 0700  In: 1900 [I.V.:1900]  Out: 2900 [Urine:2875]    Physical Exam:   GENERAL: alert, cooperative, no distress, appears stated age  EYE: negative  LYMPHATIC: Cervical, supraclavicular, and axillary nodes normal.   THROAT & NECK: normal and no erythema or exudates noted. LUNG: clear to auscultation bilaterally  HEART: regular rate and rhythm, S1, S2 normal, no murmur, click, rub or gallop  ABDOMEN: soft, non-tender. Bowel sounds normal. No masses,  no organomegaly  EXTREMITIES:  LLE: covered with sterile gauze. Pulses present, less edema   SKIN: Normal.  NEUROLOGIC: negative  PSYCHIATRIC: non focal    Lab/Data Review: All lab results for the last 24 hours reviewed. Assessment:     Active Problems:    Leukocytosis (2/13/2018)      Hypoalbuminemia (2/13/2018)      Anemia (2/13/2018)      Left leg cellulitis (2/13/2018)      Abnormal transaminases (2/13/2018)      Thrombocytosis ([address] (2/13/2018)      Cellulitis (2/15/2018)        Plan:   1.  Sepsis due to Left leg with multiloculated abscess /  septic arthritis/ MRSA bacteremia: S/P I&D periarticular and supraarticular abscesses-  ECHO negative for vegetations- continue vancomycin- f/u blood cultures- ID F/U- ortho and general surgery consult-      2. Thrombocytosis: possible to stress / infection- monitor daily     3. Hypoalbuminemia: s/p albumin iv total of 4 doses and  lasix     4. Transaminitis and elevated alk phosphatase: liver US was normal- hepatitis panel negative- likely due to bacteremia- monitor     5. Anemia: unknown if SEGUNDO / chronic anemia: low iron stores- may need iron transfusion once his infection is clear as outpatient     6. Obesity: counseling      -DVT ppx: heparin, GCS over right leg     Full code    Signed By: Lisa Del Rio MD     February 18, 2018

## 2018-02-18 NOTE — PROGRESS NOTES
Pharmacokinetic Consult to Pharmacist    Breanna Sherman is a 57 y.o. male being treated for sepsis of unknonw etiology with vancomycin. Height: 5' 8\" (172.7 cm)  Weight: 113.4 kg (250 lb)  Lab Results   Component Value Date/Time    BUN 13 02/18/2018 06:06 AM    Creatinine 0.94 02/18/2018 06:06 AM    WBC 17.8 (H) 02/18/2018 06:06 AM      Estimated Creatinine Clearance: 97 mL/min (based on Cr of 0.94). CULTURES:  2/13 BC x 2  :  MRSA from one tube and SA from second tube  2/14 BC x 2 : SA  2/16 BC x 2 : pending  2/16 knee spiriate x 3 : pending  2/17 BX x 2 : in process      Lab Results   Component Value Date/Time    Vancomycin,trough 17.5 02/18/2018 12:13 PM       Day 6 of vancomycin. Goal trough is 15-20.   We will continue the dose of 1500mg q8h ; will continue to follow the patient  and adjust the dose as necessary per guidelines    Thank you,  Christiano Cosme, PaulD

## 2018-02-18 NOTE — PROGRESS NOTES
General Surgery Progress Note    2/18/2018    Admit Date: 2/13/2018    Subjective:     Surgery POD #1  Left lower extremity packing removed at bedside. Very painful even with analgesics given before procedure. Objective:     Visit Vitals    /79 (BP 1 Location: Right arm, BP Patient Position: At rest)    Pulse 99    Temp 98.4 °F (36.9 °C)    Resp 20    Ht 5' 8\" (1.727 m)    Wt 250 lb (113.4 kg)    SpO2 97%    BMI 38.01 kg/m2         Intake/Output Summary (Last 24 hours) at 02/18/18 0942  Last data filed at 02/18/18 0606   Gross per 24 hour   Intake              900 ml   Output             1975 ml   Net            -1075 ml        EXAM:  Extremity: left lateral thigh wound is clean. No evidence of necrotizing fasciitis. Muscle tissue very healthy. Data Review    Recent Results (from the past 24 hour(s))   CULTURE, BODY FLUID W GRAM STAIN    Collection Time: 02/17/18  9:45 AM   Result Value Ref Range    Special Requests: NO SPECIAL REQUESTS      GRAM STAIN PENDING     Culture result: NO GROWTH 1 DAY     HEPATIC FUNCTION PANEL    Collection Time: 02/18/18  6:06 AM   Result Value Ref Range    Protein, total 8.1 6.3 - 8.2 g/dL    Albumin 2.5 (L) 3.2 - 4.6 g/dL    Globulin 5.6 (H) 2.3 - 3.5 g/dL    A-G Ratio 0.4 (L) 1.2 - 3.5      Bilirubin, total 0.8 0.2 - 1.1 MG/DL    Bilirubin, direct 0.3 <0.4 MG/DL    Alk.  phosphatase 176 (H) 50 - 136 U/L    AST (SGOT) 73 (H) 15 - 37 U/L    ALT (SGPT) 120 (H) 12 - 65 U/L   CBC WITH AUTOMATED DIFF    Collection Time: 02/18/18  6:06 AM   Result Value Ref Range    WBC 17.8 (H) 4.3 - 11.1 K/uL    RBC 3.35 (L) 4.23 - 5.67 M/uL    HGB 8.5 (L) 13.6 - 17.2 g/dL    HCT 26.6 (L) 41.1 - 50.3 %    MCV 79.4 (L) 79.6 - 97.8 FL    MCH 25.4 (L) 26.1 - 32.9 PG    MCHC 32.0 31.4 - 35.0 g/dL    RDW 16.5 (H) 11.9 - 14.6 %    PLATELET 547 (H) 360 - 450 K/uL    MPV 9.5 (L) 10.8 - 14.1 FL    DF AUTOMATED      NEUTROPHILS 77 43 - 78 %    LYMPHOCYTES 13 13 - 44 %    MONOCYTES 8 4.0 - 12.0 %    EOSINOPHILS 0 (L) 0.5 - 7.8 %    BASOPHILS 0 0.0 - 2.0 %    IMMATURE GRANULOCYTES 2 0.0 - 5.0 %    ABS. NEUTROPHILS 13.5 (H) 1.7 - 8.2 K/UL    ABS. LYMPHOCYTES 2.3 0.5 - 4.6 K/UL    ABS. MONOCYTES 1.5 (H) 0.1 - 1.3 K/UL    ABS. EOSINOPHILS 0.1 0.0 - 0.8 K/UL    ABS. BASOPHILS 0.0 0.0 - 0.2 K/UL    ABS. IMM. GRANS. 0.4 0.0 - 0.5 K/UL   METABOLIC PANEL, BASIC    Collection Time: 02/18/18  6:06 AM   Result Value Ref Range    Sodium 137 136 - 145 mmol/L    Potassium 4.0 3.5 - 5.1 mmol/L    Chloride 101 98 - 107 mmol/L    CO2 26 21 - 32 mmol/L    Anion gap 10 7 - 16 mmol/L    Glucose 98 65 - 100 mg/dL    BUN 13 8 - 23 MG/DL    Creatinine 0.94 0.8 - 1.5 MG/DL    GFR est AA >60 >60 ml/min/1.73m2    GFR est non-AA >60 >60 ml/min/1.73m2    Calcium 10.5 (H) 8.3 - 10.4 MG/DL        Hospital Problems  Date Reviewed: 2/13/2018          Codes Class Noted POA    Cellulitis ICD-10-CM: L03.90  ICD-9-CM: 682.9  2/15/2018 Unknown        Leukocytosis ICD-10-CM: D72.829  ICD-9-CM: 288.60  2/13/2018 Yes        Hypoalbuminemia ICD-10-CM: E88.09  ICD-9-CM: 273.8  2/13/2018 Yes        Anemia ICD-10-CM: D64.9  ICD-9-CM: 285.9  2/13/2018 Yes        Left leg cellulitis ICD-10-CM: L03.116  ICD-9-CM: 682.6  2/13/2018 Yes        Abnormal transaminases ICD-10-CM: R74.8  ICD-9-CM: 790.4  2/13/2018 Yes        Thrombocytosis ([address] ICD-10-CM: D47.3  ICD-9-CM: 238.71  2/13/2018 Yes          1. Will consult Wound Care. 2. Continue antibiotics. 3. Local wound care.   Romana Lota, MD.

## 2018-02-19 LAB
ALBUMIN SERPL-MCNC: 2.2 G/DL (ref 3.2–4.6)
ALBUMIN/GLOB SERPL: 0.4 {RATIO} (ref 1.2–3.5)
ALP SERPL-CCNC: 158 U/L (ref 50–136)
ALT SERPL-CCNC: 117 U/L (ref 12–65)
ANION GAP SERPL CALC-SCNC: 9 MMOL/L (ref 7–16)
AST SERPL-CCNC: 77 U/L (ref 15–37)
BACTERIA SPEC CULT: ABNORMAL
BACTERIA SPEC CULT: NORMAL
BACTERIA SPEC CULT: NORMAL
BASOPHILS # BLD: 0 K/UL (ref 0–0.2)
BASOPHILS NFR BLD: 0 % (ref 0–2)
BILIRUB DIRECT SERPL-MCNC: 0.2 MG/DL
BILIRUB SERPL-MCNC: 0.5 MG/DL (ref 0.2–1.1)
BUN SERPL-MCNC: 15 MG/DL (ref 8–23)
CALCIUM SERPL-MCNC: 9.2 MG/DL (ref 8.3–10.4)
CHLORIDE SERPL-SCNC: 103 MMOL/L (ref 98–107)
CO2 SERPL-SCNC: 27 MMOL/L (ref 21–32)
CREAT SERPL-MCNC: 1.09 MG/DL (ref 0.8–1.5)
DIFFERENTIAL METHOD BLD: ABNORMAL
EOSINOPHIL # BLD: 0.3 K/UL (ref 0–0.8)
EOSINOPHIL NFR BLD: 2 % (ref 0.5–7.8)
ERYTHROCYTE [DISTWIDTH] IN BLOOD BY AUTOMATED COUNT: 17 % (ref 11.9–14.6)
GLOBULIN SER CALC-MCNC: 5 G/DL (ref 2.3–3.5)
GLUCOSE SERPL-MCNC: 99 MG/DL (ref 65–100)
GRAM STN SPEC: ABNORMAL
GRAM STN SPEC: NORMAL
HCT VFR BLD AUTO: 25.2 % (ref 41.1–50.3)
HGB BLD-MCNC: 7.6 G/DL (ref 13.6–17.2)
IMM GRANULOCYTES # BLD: 0.4 K/UL (ref 0–0.5)
IMM GRANULOCYTES NFR BLD AUTO: 3 % (ref 0–5)
LYMPHOCYTES # BLD: 2.4 K/UL (ref 0.5–4.6)
LYMPHOCYTES NFR BLD: 18 % (ref 13–44)
MCH RBC QN AUTO: 23.9 PG (ref 26.1–32.9)
MCHC RBC AUTO-ENTMCNC: 30.2 G/DL (ref 31.4–35)
MCV RBC AUTO: 79.2 FL (ref 79.6–97.8)
MM INDURATION POC: NORMAL (ref 0–5)
MONOCYTES # BLD: 1.3 K/UL (ref 0.1–1.3)
MONOCYTES NFR BLD: 10 % (ref 4–12)
NEUTS SEG # BLD: 8.7 K/UL (ref 1.7–8.2)
NEUTS SEG NFR BLD: 67 % (ref 43–78)
PLATELET # BLD AUTO: 660 K/UL (ref 150–450)
PMV BLD AUTO: 9.1 FL (ref 10.8–14.1)
POTASSIUM SERPL-SCNC: 3.8 MMOL/L (ref 3.5–5.1)
PPD POC: NORMAL NEGATIVE
PROT SERPL-MCNC: 7.2 G/DL (ref 6.3–8.2)
RBC # BLD AUTO: 3.18 M/UL (ref 4.23–5.67)
SERVICE CMNT-IMP: ABNORMAL
SERVICE CMNT-IMP: ABNORMAL
SERVICE CMNT-IMP: NORMAL
SERVICE CMNT-IMP: NORMAL
SODIUM SERPL-SCNC: 139 MMOL/L (ref 136–145)
WBC # BLD AUTO: 13.1 K/UL (ref 4.3–11.1)

## 2018-02-19 PROCEDURE — 85025 COMPLETE CBC W/AUTO DIFF WBC: CPT | Performed by: SURGERY

## 2018-02-19 PROCEDURE — 77030019952 HC CANSTR VAC ASST KCON -B

## 2018-02-19 PROCEDURE — 36415 COLL VENOUS BLD VENIPUNCTURE: CPT | Performed by: SURGERY

## 2018-02-19 PROCEDURE — 74750000023 HC WOUND THERAPY

## 2018-02-19 PROCEDURE — 80076 HEPATIC FUNCTION PANEL: CPT | Performed by: SURGERY

## 2018-02-19 PROCEDURE — 80048 BASIC METABOLIC PNL TOTAL CA: CPT | Performed by: SURGERY

## 2018-02-19 PROCEDURE — 77030019934 HC DRSG VAC ASST KCON -B

## 2018-02-19 PROCEDURE — 87040 BLOOD CULTURE FOR BACTERIA: CPT | Performed by: INTERNAL MEDICINE

## 2018-02-19 PROCEDURE — 74011250637 HC RX REV CODE- 250/637: Performed by: SURGERY

## 2018-02-19 PROCEDURE — 97606 NEG PRS WND THER DME>50 SQCM: CPT

## 2018-02-19 PROCEDURE — 65270000029 HC RM PRIVATE

## 2018-02-19 PROCEDURE — 74011250636 HC RX REV CODE- 250/636: Performed by: INTERNAL MEDICINE

## 2018-02-19 RX ORDER — HYDROMORPHONE HYDROCHLORIDE 2 MG/ML
1 INJECTION, SOLUTION INTRAMUSCULAR; INTRAVENOUS; SUBCUTANEOUS
Status: DISCONTINUED | OUTPATIENT
Start: 2018-02-19 | End: 2018-02-21 | Stop reason: HOSPADM

## 2018-02-19 RX ADMIN — HYDROMORPHONE HYDROCHLORIDE 0.5 MG: 2 INJECTION, SOLUTION INTRAMUSCULAR; INTRAVENOUS; SUBCUTANEOUS at 13:30

## 2018-02-19 RX ADMIN — VANCOMYCIN HYDROCHLORIDE 1500 MG: 10 INJECTION, POWDER, LYOPHILIZED, FOR SOLUTION INTRAVENOUS at 21:23

## 2018-02-19 RX ADMIN — HEPARIN SODIUM 5000 UNITS: 5000 INJECTION, SOLUTION INTRAVENOUS; SUBCUTANEOUS at 19:41

## 2018-02-19 RX ADMIN — OXYCODONE HYDROCHLORIDE AND ACETAMINOPHEN 2 TABLET: 5; 325 TABLET ORAL at 10:56

## 2018-02-19 RX ADMIN — HEPARIN SODIUM 5000 UNITS: 5000 INJECTION, SOLUTION INTRAVENOUS; SUBCUTANEOUS at 02:40

## 2018-02-19 RX ADMIN — HEPARIN SODIUM 5000 UNITS: 5000 INJECTION, SOLUTION INTRAVENOUS; SUBCUTANEOUS at 10:50

## 2018-02-19 RX ADMIN — OXYCODONE HYDROCHLORIDE AND ACETAMINOPHEN 2 TABLET: 5; 325 TABLET ORAL at 23:59

## 2018-02-19 RX ADMIN — HYDROMORPHONE HYDROCHLORIDE 0.5 MG: 2 INJECTION, SOLUTION INTRAMUSCULAR; INTRAVENOUS; SUBCUTANEOUS at 05:00

## 2018-02-19 RX ADMIN — HYDROMORPHONE HYDROCHLORIDE 0.5 MG: 2 INJECTION, SOLUTION INTRAMUSCULAR; INTRAVENOUS; SUBCUTANEOUS at 21:25

## 2018-02-19 RX ADMIN — VANCOMYCIN HYDROCHLORIDE 1500 MG: 10 INJECTION, POWDER, LYOPHILIZED, FOR SOLUTION INTRAVENOUS at 15:02

## 2018-02-19 RX ADMIN — VANCOMYCIN HYDROCHLORIDE 1500 MG: 10 INJECTION, POWDER, LYOPHILIZED, FOR SOLUTION INTRAVENOUS at 04:51

## 2018-02-19 NOTE — PROGRESS NOTES
Problem: Nutrition Deficit  Goal: *Optimize nutritional status  Nutrition:  Reason for assessment: Length of stay day 5. Assessment:   Diet order(s): GI soft  Food/Nutrition Patient History:  Pt presented with left leg pain 1-2 weeks; became edematous. Pt noted to have left leg cellulitis and infection of left knee. Pt s/p I & D day 2-wound vac in place. Pt states he has been trying to eat healthy foods. States he has been on a 21 day fast in January for [employer] reasons-week 1 only salads, week 2 consist of protein drinks and week 3 only water. Pt states he may have lost some weight during 3 week fast.  Pt on leave of absence from work r/t back injury; has been seeing a  and PT 2 X per week. Anthropometrics:Height: 5' 8\" (172.7 cm),  Weight: 113.4 kg (250 lb), Weight Source: Patient stated, Body mass index is 38.01 kg/(m^2). BMI class of Obesity Class 11. Macronutrient needs:  EER:  0502-7417 kcal /day (15-18 kcal/kg BW)  EPR:   grams protein/day (1.2-1.5 grams/kg IBW) 60GFR >  Intake/Comparative Standards: 1 recorded intake at 100%; pt verbalized good po intake at breakfast, minimal po intake at lunch r/t not hungry; pain. Based on limited data, pt potentially meets ~ 50-75% estimated kcal and protein needs. Nutrition Diagnosis: Increased nutrient needs r/t wound healing as evidenced by left leg cellulitis, s/p day 2 surgery with wound vac in place. Intervention:  Meals and snacks: Continue current diet. Nutrition Supplement Therapy:  Initiated Ensure Enlive 2 X day. Ordered weight   Discharge nutrition plan: Too soon to determine. Coordination of Nutrition Care:  Interdisciplinary Rounds.     Itzel Henley, [address], [facility] [address] [place] [address], MPH  [phone]

## 2018-02-19 NOTE — WOUND CARE
Patient seen for large I&D site on left thigh, lateral. 19.2x 7.0x 5.0cm with exposed muscle. Clean and heavy serosanguinous drainage. VAC placed and home VAC form clinicals completed and given to Kanika . Patient has had a VAC in the past and was in agreement to using this therapy. Premedicated for pain but still had significant pain. Primary nurse present and aware. Dressing included adaptic to base, 1 piece and 3 pieces of black vac foam. Set to 125 mmHg. Will follow up on Wednesday for dressing if he remains in acute care.

## 2018-02-19 NOTE — PROGRESS NOTES
His right knee is doing well. The culture is negative so far. The fluid has not come back. The problem with the right knee  is arthritis. He can have a cortisone injection later if necessary. He will need home antibiotic IV therapy for the infection of the left knee joint. Infectious Disease can set this up. The wound care nurse will see the patient and he can be followed in the wound care clinic. A wound vac would be a good idea. I will also follow him in my office as well.

## 2018-02-19 NOTE — PROGRESS NOTES
Patient with complaints of increased pain and drainage from his wound. Leg wound reinforced and patient medicated with Percocet x 2 for pain.

## 2018-02-19 NOTE — PROGRESS NOTES
PT consult  Order received, but pt has been observed by PT & Nursing staff nindependently walking laps entire 3 rd floor repeatedly. No follow up skilled acute PT appears indicated at this time.  Santos Neil, PT, ARACELIS

## 2018-02-19 NOTE — PROGRESS NOTES
Progress Note    Patient: Yash Felder MRN: [id number]  SSN: [id number]    YOB: 1955  Age: 57 y.o. Sex: male      Admit Date: 2/13/2018    LOS: 6 days     Subjective:   Yash Felder is a 57 y.o. male who has no significant PMH who was admitted for left leg non purulent cellulitis. Of note, he lives in [place] and came to visit family members to [place], [place] on 2/9/18. He denied falls, prior knee or leg surgery. Duplex was was unremarkable for DVT. He was started on vancomycin. Has persistent Blood cultures positive for MRSA bacteremia. ECHO negative for vegetations. Due to poor improvement of his thigh and leg edema and pain he underwent an MRI of his leg showing multiloculated abscesses and findings of septic knee. He underwent I&D and knee lavage on 2/17/18 by general surgery. Orthopedics performed knee aspiration. So far, all BC and wound aspirate and cultures are positive for staph aureus. ID on board. Plan for outpatient antibiotic completion at SNF/Rehab and wound care once Blood cultures are negative and he gets a PICC line. 2/19/18: on my assessment today he was found in no distress. He spike fever last night. Leukocytosis trending down. ROS: all pertinent findings described in my note. Objective:     Vitals:    02/18/18 2011 02/18/18 2243 02/19/18 0125 02/19/18 0355   BP: 140/82 137/54 136/64 138/68   Pulse: 100 (!) 122 100 98   Resp: 20 22 20 20   Temp: 98.4 °F (36.9 °C) (!) 101.5 °F (38.6 °C) 100 °F (37.8 °C) 98.8 °F (37.1 °C)   SpO2: 97% 97% 94% 96%   Weight:       Height:            Intake and Output:  Current Shift:    Last three shifts: 02/17 1901 - 02/19 0700  In: 400 [I.V.:400]  Out: 2150 [Urine:2150]    Physical Exam:   GENERAL: alert, cooperative, no distress, appears stated age  EYE: negative  LYMPHATIC: Cervical, supraclavicular, and axillary nodes normal.   THROAT & NECK: normal and no erythema or exudates noted.    LUNG: clear to auscultation bilaterally  HEART: regular rate and rhythm, S1, S2 normal, no murmur, click, rub or gallop  ABDOMEN: soft, non-tender. Bowel sounds normal. No masses,  no organomegaly  EXTREMITIES:  LLE: covered with VAC. Pulses present, less edema   SKIN: Normal.  NEUROLOGIC: negative  PSYCHIATRIC: non focal    Lab/Data Review: All lab results for the last 24 hours reviewed. Assessment:     Active Problems:    Leukocytosis (2/13/2018)      Hypoalbuminemia (2/13/2018)      Anemia (2/13/2018)      Left leg cellulitis (2/13/2018)      Abnormal transaminases (2/13/2018)      Thrombocytosis ([address] (2/13/2018)      Cellulitis (2/15/2018)        Plan:   1. Sepsis due to Left leg with multiloculated abscess /  septic arthritis/ MRSA bacteremia: S/P I&D periarticular and supraarticular abscesses- continue vancomycin- f/u blood and wound cultures- ID F/U- ortho and general surgery consult- cardiology consult for MANJINDER to r/o infective endocarditis      2. Thrombocytosis: possible to stress / infection- monitor daily     3. Hypoalbuminemia: s/p albumin iv total of 4 doses and  lasix     4. Transaminitis and elevated alk phosphatase: liver US was normal- hepatitis panel negative- likely due to bacteremia- monitor     5. Anemia:  SEGUNDO / chronic anemia: low iron stores- may need iron transfusion once his infection is clear as outpatient - F/U HH if < 7gr transfuse PRN     6. Obesity: counseling      -DVT ppx: heparin, GCS over right leg     Full code    Care manager on board.  For SNF once he is medically ready     Signed By: Abida Kitchen MD     February 19, 2018

## 2018-02-19 NOTE — PROGRESS NOTES
Chart reviewed. SW speaks with pt regarding d/c and possible need for long term IV ABX. Pt lives in [place], came to [place] to visit family ( pt's sister Catherine Dorsey [phone],  Toby Avilez [phone] )  Pt's daughter in [place] (  LOGBOT  [phone] ). Pt states if he needs IV ABX at d/c he will need to go to a rehab/SNF. Pt states he is unable to stay with family. SW explained process  Of acquiring SNF and pt's insurance [facility] ) will have to approve admission. Will continue to follow & await MD order.

## 2018-02-19 NOTE — PROGRESS NOTES
Discharge instructions and prescriptions provided and explained to the pt. Med side effect sheet reviewed. Opportunity for questions provided. Pt is using the restroom. Instructed to call once ready to leave.

## 2018-02-19 NOTE — PROGRESS NOTES
Patient has a MEWS score of 6. Heart rate is elevated and patient has a low-grade fever. PRN Tylenol given.

## 2018-02-19 NOTE — PROGRESS NOTES
Infectious Disease Progress Note    Today's Date: 2018   Admit Date: 2018    Impression:   · MRSA bacteremia ( - )  · MRSA pyomyositis left leg  · MRSA left knee septic arthritis, aspirated 18  · Recent lumbar spine surgery with hardware, 10/2017 (in [place])  · Elevated LFTs - hep panel negative; ? NAFLD  · Microcytic anemia with elevated ferritin - likely Chronic disease anemia or inflammatory anemia  · Thrombocytosis    Plan:   · Continue IV vancomycin. · Repeat blood cultures pending from today  · Consult cardiology for a MANJINDER which would change the expected duration and antibiotic choice if IE found  · Duration of therapy to be determined. He is from [place], but is willing to stay in [facility] until this has been adequately treated. Anti-infectives:   IV vancomycin    Subjective:   Dr. Rachel Cabrera I&D 50 ml of infected fluid from deep tissue abscesses in left leg on 18. He had temp 101.5 last night. No Known Allergies     Review of Systems:  Pertinent items are noted in the History of Present Illness. Objective:     Visit Vitals    /80 (BP 1 Location: Left arm, BP Patient Position: At rest;Sitting)    Pulse (!) 112    Temp 98.5 °F (36.9 °C)    Resp 22    Ht 5' 8\" (1.727 m)    Wt 113.4 kg (250 lb)    SpO2 97%    BMI 38.01 kg/m2     Temp (24hrs), Av.2 °F (37.3 °C), Min:98.4 °F (36.9 °C), Max:101.5 °F (38.6 °C)       Lines:  Peripheral IV:            Physical Exam:    General:  Alert, cooperative, in bed   Eyes:  Sclera anicteric   Mouth/Throat: oral pharynx clear   Neck: Supple   Lungs:   Breathing comfortably   CV:  Tachycardic   Abdomen:   non-distended   Extremities: No cyanosis; severe LLE edema   Skin: LLE venous stasis changes noted   Lymph nodes:    Musculoskeletal: Left lateral thigh wound VAC.   Left knee is mildly swollen   Lines/Devices:  Intact, no erythema, drainage or tenderness   Psych: Alert and responsive; mentating appropriately       Data Review: CBC:  Recent Labs      02/19/18 0618 02/18/18 0606  02/17/18 0613   WBC  13.1*  17.8*  14.4*   GRANS  67  77  67   MONOS  10  8  10   EOS  2  0*  3   ANEU  8.7*  13.5*  9.6*   ABL  2.4  2.3  2.2   HGB  7.6*  8.5*  8.8*   HCT  25.2*  26.6*  29.0*   PLT  660*  736*  798*       BMP:  Recent Labs      02/19/18 0618 02/18/18   0606 02/17/18 0613   CREA  1.09  0.94  0.93   BUN  15  13  11   NA  139  137  136   K  3.8  4.0  3.8   CL  103  101  98   CO2  27  26  27   AGAP  9  10  11   GLU  99  98  108*       LFTS:  Recent Labs      02/19/18 0618 02/18/18 0606 02/17/18 0613   TBILI  0.5  0.8  0.9   ALT  117*  120*  175*   SGOT  77*  73*  145*   AP  158*  176*  226*   TP  7.2  8.1  8.9*   ALB  2.2*  2.5*  2.6*       Microbiology:     All Micro Results     Procedure Component Value Units Date/Time    CULTURE, BLOOD [[id number]]  (Abnormal)  (Susceptibility) Collected:  02/16/18 0846    Order Status:  Completed Specimen:  Blood from Blood Updated:  02/19/18 1427     Special Requests: --        LEFT  Antecubital       GRAM STAIN GRAM POSITIVE COCCI                 AEROBIC AND ANAEROBIC BOTTLES              RESULTS VERIFIED, PHONED TO AND READ BACK BY Keren Murry4 RN AT [address] [place] [address] Av ON [id number]      Culture result:         **METHICILLIN RESISTANT STAPHYLOCOCCUS AUREUS** Use Rifampin only in conjunction with another antimicrobial agent. Do not use as Monotherapy.  (A)      PATIENT IS A KNOWN MRSA         Particia Just RN AT 6527 2.19.18 CW    CULTURE, BODY FLUID Sherre Vitaly STAIN [[id number]] Collected:  02/17/18 0945    Order Status:  Completed Specimen:  Knee  Updated:  02/19/18 0909     Special Requests: NO SPECIAL REQUESTS        GRAM STAIN 10 TO 20 WBC'S/OIF      NO DEFINITE ORGANISM SEEN        Culture result: NO GROWTH 2 DAYS       CULTURE, Lisa De La Garza STAIN [[id number]]  (Abnormal) Collected:  02/17/18 0940    Order Status:  Completed Specimen:  Wound from Knee  Updated:  02/19/18 0905     Special Requests: LEFT GRAM STAIN 2 TO 10 WBC'S/OIF      NO DEFINITE ORGANISM SEEN        Culture result:         LIGHT STAPHYLOCOCCUS AUREUS (A)    CULTURE, Bertrum Tang STAIN [[id number]]  (Abnormal) Collected:  02/17/18 0920    Order Status:  Completed Specimen:  Wound from Knee  Updated:  02/19/18 0902     Special Requests: LEFT        GRAM STAIN 25 TO 50 WBC'S/OIF      NO DEFINITE ORGANISM SEEN        Culture result:         SCANT STAPHYLOCOCCUS AUREUS SENSITIVITY TO FOLLOW (A)    CULTURE, BODY FLUID Sondra Jeffery STAIN [[id number]] Collected:  02/16/18 2003    Order Status:  Completed Specimen:  Knee  Updated:  02/19/18 0844     Special Requests: --        RIGHT  KNEE       GRAM STAIN 50  WBC'S/OIF      NO DEFINITE ORGANISM SEEN        Culture result: NO GROWTH 2 DAYS       CULTURE, ANAEROBIC [[id number]] Collected:  02/16/18 2003    Order Status:  Completed Specimen:  Knee  Updated:  02/19/18 0818     Special Requests: --        RIGHT  KNEE       Culture result:         NO ANAEROBIC GROWTH IN 2 DAYS    CULTURE, ANAEROBIC [[id number]] Collected:  02/16/18 2004    Order Status:  Completed Specimen:  Knee  Updated:  02/19/18 0818     Special Requests: --        LEFT  KNEE       Culture result:         SUBCULTURE IS NECESSARY TO DETERMINE PRESENCE OR ABSENCE OF ANAEROBIC BACTERIA IN THIS CULTURE. FURTHER REPORT TO FOLLOW AFTER INCUBATION OF SUBCULTURE. CULTURE, ANAEROBIC [[id number]] Collected:  02/17/18 0920    Order Status:  Completed Specimen:  Knee  Updated:  02/19/18 0818     Special Requests: LEFT        Culture result:         SUBCULTURE IS NECESSARY TO DETERMINE PRESENCE OR ABSENCE OF ANAEROBIC BACTERIA IN THIS CULTURE. FURTHER REPORT TO FOLLOW AFTER INCUBATION OF SUBCULTURE.     CULTURE, ANAEROBIC [[id number]] Collected:  02/17/18 0940    Order Status:  Completed Specimen:  Knee  Updated:  02/19/18 0817     Special Requests: LEFT        Culture result:         SUBCULTURE IS NECESSARY TO DETERMINE PRESENCE OR ABSENCE OF ANAEROBIC BACTERIA IN THIS CULTURE. FURTHER REPORT TO FOLLOW AFTER INCUBATION OF SUBCULTURE.     CULTURE, ANAEROBIC [[id number]] Collected:  02/17/18 0945    Order Status:  Completed Specimen:  Knee  Updated:  02/19/18 0817     Special Requests: RIGHT     Culture result:         NO ANAEROBIC GROWTH IN 2 DAYS    CULTURE, BLOOD [[id number]]  (Abnormal) Collected:  02/16/18 0852    Order Status:  Completed Specimen:  Blood from Blood Updated:  02/19/18 0636     Special Requests: --        LEFT  HAND       GRAM STAIN GRAM POSITIVE COCCI                 AEROBIC AND ANAEROBIC BOTTLES              RESULTS VERIFIED, PHONED TO AND READ BACK BY Keren Lombardo RN AT 9954 ON [id number] ZF     Culture result: STAPHYLOCOCCUS AUREUS (A)               For Susceptibility Refer to Culture  [facility] NO [id number]      CULTURE, BLOOD [[id number]] Collected:  02/19/18 0618    Order Status:  Completed Specimen:  Blood from Blood Updated:  02/19/18 0630    CULTURE, BLOOD [[id number]] Collected:  02/19/18 0622    Order Status:  Completed Specimen:  Blood from Blood Updated:  02/19/18 0630    CULTURE, BODY FLUID Gwendalyn Dominican STAIN [[id number]]  (Abnormal) Collected:  02/16/18 2004    Order Status:  Completed Specimen:  Knee  Updated:  02/18/18 0925     Special Requests: --        LEFT  KNEE       GRAM STAIN         TOO NUMEROUS TO COUNT WBC'S/OIF      RARE GRAM POSITIVE COCCI        Culture result:         LIGHT STAPHYLOCOCCUS AUREUS SENSITIVITY TO FOLLOW (A)    CULTURE, Tylene Nephew STAIN [[id number]] Collected:  02/17/18 0945    Order Status:  Canceled Specimen:  Wound from Knee  Updated:  02/17/18 1020    CULTURE, BODY FLUID Gwendalyn Dominican STAIN [[id number]] Collected:  02/16/18 2000    Order Status:  Canceled Specimen:  Knee Fluid     CULTURE, Tylene Nephew STAIN [[id number]] Collected:  02/16/18 2000    Order Status:  Canceled Specimen:  Wound from Knee      CULTURE, BODY FLUID Gwendalyn Dominican STAIN [[id number]] Collected:  02/16/18 1330    Order Status:  Canceled Specimen:  Knee Fluid     CULTURE, ANAEROBIC [[id number]] Collected:  02/16/18 1330    Order Status:  Canceled Specimen:  Knee      AFB CULTURE + SMEAR W/RFLX ID FROM CULTURE [[id number]]     Order Status:  Sent     CULTURE, BLOOD [[id number]]  (Abnormal) Collected:  02/14/18 1157    Order Status:  Completed Specimen:  Blood from Blood Updated:  02/16/18 0725     Special Requests: --        NO SPECIAL REQUESTS  LEFT  Antecubital       GRAM STAIN         GRAM POSITIVE COCCI AEROBIC BOTTLE POSITIVE              RESULTS VERIFIED, PHONED TO AND READ BACK BY [address] FLOOR [id number]. SBRAZEL     Culture result: STAPHYLOCOCCUS AUREUS (A)         REFER TO [id number] FOR ID AND SUSCEPTIBILITY    CULTURE, BLOOD [[id number]]  (Abnormal) Collected:  02/14/18 1205    Order Status:  Completed Specimen:  Blood from Blood Updated:  02/16/18 0725     Special Requests: --        NO SPECIAL REQUESTS  LEFT  HAND       GRAM STAIN GRAM POSITIVE COCCI                 RESULTS VERIFIED, PHONED TO AND READ BACK BY [address] [facility] FLOOR [id number]. SBRAZEL              AEROBIC AND ANAEROBIC BOTTLES     Culture result: STAPHYLOCOCCUS AUREUS (A)         REFER TO [id number] FOR ID AND SUSCEPTIBILITY    CULTURE, BLOOD [[id number]]  (Abnormal) Collected:  02/13/18 1638    Order Status:  Completed Specimen:  Blood from Blood Updated:  02/16/18 0725     Special Requests: --        LEFT  HAND       GRAM STAIN GRAM POSITIVE COCCI                 AEROBIC AND ANAEROBIC BOTTLES              CRITICAL RESULT NOT CALLED DUE TO PREVIOUS NOTIFICATION OF CRITICAL RESULT WITHIN THE LAST 24 HOURS. Culture result: STAPHYLOCOCCUS AUREUS (A)               RESULTS VERIFIED, PHONED TO AND READ BACK BY  Denver Simmering, RN ON 02/14/2018 AT [address] [place] [address]                MRSA target DNA sequences detected:  SA target DNA sequence detected within the sample.  Test performed by PCR   Culture/Sensitivity to follow (A)      REFER TO [id number]    CULTURE, BLOOD [[id number]]  (Abnormal)  (Susceptibility) Collected:  18 1626    Order Status:  Completed Specimen:  Blood from Blood Updated:  18 0725     Special Requests: --        RIGHT  Antecubital       GRAM STAIN GRAM POSITIVE COCCI                 AEROBIC AND ANAEROBIC BOTTLES              RESULTS VERIFIED, PHONED TO AND READ BACK BY Laine GREENE RN @0849 18 BY      Culture result:         **METHICILLIN RESISTANT STAPHYLOCOCCUS AUREUS** Use Rifampin only in conjunction with another antimicrobial agent. Do not use as Monotherapy. (A)              MRSA target DNA sequences detected:  SA target DNA sequence detected within the sample. Test performed by PCR   Culture/Sensitivity to follow (A)            RESULTS VERIFIED, PHONED TO AND READ BACK BY  ANGÉLICA RUTHERFORD AT 9616 ON 18 BY NAHUN            Imagin/15/18 L knee film: IMPRESSION:  1. Large joint effusion and soft tissue edema without acute osseous abnormality. An infectious/inflammatory process of the left knee joint cannot be excluded  given the appearance. Recommend clinical correlation. 18 MRI left leg  IMPRESSION:  1. Multiloculated fluid collections within the left lower extremity most compatible with abscesses. A large joint effusion is present with enhancement as well as enhancement within the joint space therefore concerning for septic arthritis. There are no definite findings of osteomyelitis. 2. Large right joint effusion.     Signed By: Chun Goodwin MD     2018

## 2018-02-19 NOTE — PROGRESS NOTES
Yael from wound care to patient's bedside for application of wound vac. Medicated with Dilaudid 0.5 mg IV.

## 2018-02-19 NOTE — PROGRESS NOTES
Spoke with Dr. Khoi Adan and received order for Dilaudid 1 mg IV for wound care changes only. Read back and verified.

## 2018-02-19 NOTE — PROGRESS NOTES
General Surgery Progress Note    2/19/2018    Admit Date: 2/13/2018    Subjective:     Surgery POD #2  The patient says his left knee feels \"better. \" He would like to start walking. Objective:     Visit Vitals    /85 (BP 1 Location: Left arm, BP Patient Position: At rest;Sitting)    Pulse (!) 107    Temp 98.6 °F (37 °C)    Resp 22    Ht 5' 8\" (1.727 m)    Wt 250 lb (113.4 kg)    SpO2 99%    BMI 38.01 kg/m2         Intake/Output Summary (Last 24 hours) at 02/19/18 1037  Last data filed at 02/19/18 0843   Gross per 24 hour   Intake                0 ml   Output             1900 ml   Net            -1900 ml        EXAM:  Extr: left knee wound is clean. Packing in place. No fasciitis. Data Review    Recent Results (from the past 24 hour(s))   VANCOMYCIN, TROUGH    Collection Time: 02/18/18 12:13 PM   Result Value Ref Range    Vancomycin,trough 17.5 5 - 20 ug/mL   HEPATIC FUNCTION PANEL    Collection Time: 02/19/18  6:18 AM   Result Value Ref Range    Protein, total 7.2 6.3 - 8.2 g/dL    Albumin 2.2 (L) 3.2 - 4.6 g/dL    Globulin 5.0 (H) 2.3 - 3.5 g/dL    A-G Ratio 0.4 (L) 1.2 - 3.5      Bilirubin, total 0.5 0.2 - 1.1 MG/DL    Bilirubin, direct 0.2 <0.4 MG/DL    Alk. phosphatase 158 (H) 50 - 136 U/L    AST (SGOT) 77 (H) 15 - 37 U/L    ALT (SGPT) 117 (H) 12 - 65 U/L   CBC WITH AUTOMATED DIFF    Collection Time: 02/19/18  6:18 AM   Result Value Ref Range    WBC 13.1 (H) 4.3 - 11.1 K/uL    RBC 3.18 (L) 4.23 - 5.67 M/uL    HGB 7.6 (L) 13.6 - 17.2 g/dL    HCT 25.2 (L) 41.1 - 50.3 %    MCV 79.2 (L) 79.6 - 97.8 FL    MCH 23.9 (L) 26.1 - 32.9 PG    MCHC 30.2 (L) 31.4 - 35.0 g/dL    RDW 17.0 (H) 11.9 - 14.6 %    PLATELET 695 (H) 258 - 450 K/uL    MPV 9.1 (L) 10.8 - 14.1 FL    DF AUTOMATED      NEUTROPHILS 67 43 - 78 %    LYMPHOCYTES 18 13 - 44 %    MONOCYTES 10 4.0 - 12.0 %    EOSINOPHILS 2 0.5 - 7.8 %    BASOPHILS 0 0.0 - 2.0 %    IMMATURE GRANULOCYTES 3 0.0 - 5.0 %    ABS.  NEUTROPHILS 8.7 (H) 1.7 - 8.2 K/UL    ABS. LYMPHOCYTES 2.4 0.5 - 4.6 K/UL    ABS. MONOCYTES 1.3 0.1 - 1.3 K/UL    ABS. EOSINOPHILS 0.3 0.0 - 0.8 K/UL    ABS. BASOPHILS 0.0 0.0 - 0.2 K/UL    ABS. IMM. GRANS. 0.4 0.0 - 0.5 K/UL   METABOLIC PANEL, BASIC    Collection Time: 02/19/18  6:18 AM   Result Value Ref Range    Sodium 139 136 - 145 mmol/L    Potassium 3.8 3.5 - 5.1 mmol/L    Chloride 103 98 - 107 mmol/L    CO2 27 21 - 32 mmol/L    Anion gap 9 7 - 16 mmol/L    Glucose 99 65 - 100 mg/dL    BUN 15 8 - 23 MG/DL    Creatinine 1.09 0.8 - 1.5 MG/DL    GFR est AA >60 >60 ml/min/1.73m2    GFR est non-AA >60 >60 ml/min/1.73m2    Calcium 9.2 8.3 - 10.4 MG/DL        Hospital Problems  Date Reviewed: 2/13/2018          Codes Class Noted POA    Cellulitis ICD-10-CM: L03.90  ICD-9-CM: 682.9  2/15/2018 Unknown        Leukocytosis ICD-10-CM: D72.829  ICD-9-CM: 288.60  2/13/2018 Yes        Hypoalbuminemia ICD-10-CM: E88.09  ICD-9-CM: 273.8  2/13/2018 Yes        Anemia ICD-10-CM: D64.9  ICD-9-CM: 285.9  2/13/2018 Yes        Left leg cellulitis ICD-10-CM: L03.116  ICD-9-CM: 682.6  2/13/2018 Yes        Abnormal transaminases ICD-10-CM: R74.8  ICD-9-CM: 790.4  2/13/2018 Yes        Thrombocytosis ([address] ICD-10-CM: D47.3  ICD-9-CM: 238.71  2/13/2018 Yes          1. I am not comfortable taking care of this patient with an open left knee. I have no experience caring for this problem. I do not know whether this needs to be washed out, have a Wound VAC placed or simply just local wound care. 2. I saw Dr. Twylla Cos, of orthopedics, who deals with knees and hips on a daily basis. He said he would look at the patient for me to offer any suggestions. 3. ID following. Continue antibiotics. 4. Placement. 5. PT consult.   Raulito Batres MD.

## 2018-02-19 NOTE — PROGRESS NOTES
Shift Assessment - Patient is alert and oriented. Respirations even and unlabored. No complaint of pain at this time. Patient sitting in recliner watching TV. Woundcare consulted for left leg. Denies pain at this time. Call light within reach, pt instructed to call for assistance.

## 2018-02-19 NOTE — PROGRESS NOTES
Received bedside shift report from off going nurse Gabbie Panda RN which included SBAR, MAR, and Plan of Care. Patient is resting quietly with eyes closed and respirations present. Patient is on contact precautions for MRSA. Will continue to follow patient's progression through hourly rounding. Will meet all requests as needed and appropriate.

## 2018-02-20 ENCOUNTER — ANESTHESIA EVENT (OUTPATIENT)
Dept: SURGERY | Age: 63
DRG: 853 | End: 2018-02-20
Payer: COMMERCIAL

## 2018-02-20 LAB
ALBUMIN SERPL-MCNC: 2.5 G/DL (ref 3.2–4.6)
ALBUMIN/GLOB SERPL: 0.4 {RATIO} (ref 1.2–3.5)
ALP SERPL-CCNC: 169 U/L (ref 50–136)
ALT SERPL-CCNC: 109 U/L (ref 12–65)
ANION GAP SERPL CALC-SCNC: 11 MMOL/L (ref 7–16)
AST SERPL-CCNC: 58 U/L (ref 15–37)
BACTERIA SPEC CULT: ABNORMAL
BASOPHILS # BLD: 0 K/UL (ref 0–0.2)
BASOPHILS NFR BLD: 0 % (ref 0–2)
BILIRUB DIRECT SERPL-MCNC: 0.2 MG/DL
BILIRUB SERPL-MCNC: 0.5 MG/DL (ref 0.2–1.1)
BODY FLD TYPE: NORMAL
BUN SERPL-MCNC: 13 MG/DL (ref 8–23)
CALCIUM SERPL-MCNC: 10 MG/DL (ref 8.3–10.4)
CHLORIDE SERPL-SCNC: 105 MMOL/L (ref 98–107)
CO2 SERPL-SCNC: 25 MMOL/L (ref 21–32)
CREAT SERPL-MCNC: 1.12 MG/DL (ref 0.8–1.5)
CRYSTALS FLD MICRO: NORMAL
DIFFERENTIAL METHOD BLD: ABNORMAL
EOSINOPHIL # BLD: 0.3 K/UL (ref 0–0.8)
EOSINOPHIL NFR BLD: 2 % (ref 0.5–7.8)
ERYTHROCYTE [DISTWIDTH] IN BLOOD BY AUTOMATED COUNT: 17.8 % (ref 11.9–14.6)
GLOBULIN SER CALC-MCNC: 5.9 G/DL (ref 2.3–3.5)
GLUCOSE SERPL-MCNC: 102 MG/DL (ref 65–100)
GRAM STN SPEC: ABNORMAL
HCT VFR BLD AUTO: 27.6 % (ref 41.1–50.3)
HGB BLD-MCNC: 8.3 G/DL (ref 13.6–17.2)
IMM GRANULOCYTES # BLD: 0.3 K/UL (ref 0–0.5)
IMM GRANULOCYTES NFR BLD AUTO: 2 % (ref 0–5)
LYMPHOCYTES # BLD: 2.2 K/UL (ref 0.5–4.6)
LYMPHOCYTES NFR BLD: 14 % (ref 13–44)
MCH RBC QN AUTO: 24.4 PG (ref 26.1–32.9)
MCHC RBC AUTO-ENTMCNC: 30.1 G/DL (ref 31.4–35)
MCV RBC AUTO: 81.2 FL (ref 79.6–97.8)
MM INDURATION POC: NORMAL MM (ref 0–5)
MONOCYTES # BLD: 1.3 K/UL (ref 0.1–1.3)
MONOCYTES NFR BLD: 8 % (ref 4–12)
NEUTS SEG # BLD: 12 K/UL (ref 1.7–8.2)
NEUTS SEG NFR BLD: 74 % (ref 43–78)
PLATELET # BLD AUTO: 741 K/UL (ref 150–450)
PMV BLD AUTO: 9.3 FL (ref 10.8–14.1)
POTASSIUM SERPL-SCNC: 3.4 MMOL/L (ref 3.5–5.1)
PPD POC: NEGATIVE NEGATIVE
PROT SERPL-MCNC: 8.4 G/DL (ref 6.3–8.2)
RBC # BLD AUTO: 3.4 M/UL (ref 4.23–5.67)
SERVICE CMNT-IMP: ABNORMAL
SODIUM SERPL-SCNC: 141 MMOL/L (ref 136–145)
WBC # BLD AUTO: 16 K/UL (ref 4.3–11.1)

## 2018-02-20 PROCEDURE — 74011250636 HC RX REV CODE- 250/636: Performed by: INTERNAL MEDICINE

## 2018-02-20 PROCEDURE — 74011250637 HC RX REV CODE- 250/637: Performed by: FAMILY MEDICINE

## 2018-02-20 PROCEDURE — 65270000029 HC RM PRIVATE

## 2018-02-20 PROCEDURE — 80076 HEPATIC FUNCTION PANEL: CPT | Performed by: SURGERY

## 2018-02-20 PROCEDURE — 85025 COMPLETE CBC W/AUTO DIFF WBC: CPT | Performed by: SURGERY

## 2018-02-20 PROCEDURE — 74750000023 HC WOUND THERAPY

## 2018-02-20 PROCEDURE — 80048 BASIC METABOLIC PNL TOTAL CA: CPT | Performed by: SURGERY

## 2018-02-20 PROCEDURE — 36415 COLL VENOUS BLD VENIPUNCTURE: CPT | Performed by: SURGERY

## 2018-02-20 PROCEDURE — 74011250637 HC RX REV CODE- 250/637: Performed by: SURGERY

## 2018-02-20 RX ORDER — TRAMADOL HYDROCHLORIDE 50 MG/1
50 TABLET ORAL
Status: CANCELLED | OUTPATIENT
Start: 2018-02-20

## 2018-02-20 RX ORDER — VANCOMYCIN/0.9 % SOD CHLORIDE 1.5G/250ML
1500 PLASTIC BAG, INJECTION (ML) INTRAVENOUS EVERY 8 HOURS
Status: CANCELLED | OUTPATIENT
Start: 2018-02-20

## 2018-02-20 RX ORDER — HYDROMORPHONE HYDROCHLORIDE 2 MG/ML
0.5 INJECTION, SOLUTION INTRAMUSCULAR; INTRAVENOUS; SUBCUTANEOUS
Status: CANCELLED | OUTPATIENT
Start: 2018-02-20

## 2018-02-20 RX ORDER — HYDRALAZINE HYDROCHLORIDE 20 MG/ML
20 INJECTION INTRAMUSCULAR; INTRAVENOUS
Status: CANCELLED | OUTPATIENT
Start: 2018-02-20

## 2018-02-20 RX ORDER — OXYCODONE AND ACETAMINOPHEN 5; 325 MG/1; MG/1
2 TABLET ORAL
Status: CANCELLED | OUTPATIENT
Start: 2018-02-20

## 2018-02-20 RX ORDER — SODIUM CHLORIDE 9 MG/ML
250 INJECTION, SOLUTION INTRAVENOUS AS NEEDED
Status: CANCELLED | OUTPATIENT
Start: 2018-02-20

## 2018-02-20 RX ORDER — NALOXONE HYDROCHLORIDE 0.4 MG/ML
0.4 INJECTION, SOLUTION INTRAMUSCULAR; INTRAVENOUS; SUBCUTANEOUS
Status: CANCELLED | OUTPATIENT
Start: 2018-02-20

## 2018-02-20 RX ORDER — HEPARIN SODIUM 5000 [USP'U]/ML
5000 INJECTION, SOLUTION INTRAVENOUS; SUBCUTANEOUS EVERY 8 HOURS
Status: CANCELLED | OUTPATIENT
Start: 2018-02-20

## 2018-02-20 RX ORDER — OXYCODONE AND ACETAMINOPHEN 5; 325 MG/1; MG/1
1 TABLET ORAL
Status: CANCELLED | OUTPATIENT
Start: 2018-02-20

## 2018-02-20 RX ORDER — POTASSIUM CHLORIDE 20 MEQ/1
40 TABLET, EXTENDED RELEASE ORAL
Status: COMPLETED | OUTPATIENT
Start: 2018-02-20 | End: 2018-02-20

## 2018-02-20 RX ORDER — ACETAMINOPHEN 325 MG/1
650 TABLET ORAL
Status: CANCELLED | OUTPATIENT
Start: 2018-02-20

## 2018-02-20 RX ORDER — HYDROMORPHONE HYDROCHLORIDE 2 MG/ML
1 INJECTION, SOLUTION INTRAMUSCULAR; INTRAVENOUS; SUBCUTANEOUS
Status: CANCELLED | OUTPATIENT
Start: 2018-02-20

## 2018-02-20 RX ADMIN — HEPARIN SODIUM 5000 UNITS: 5000 INJECTION, SOLUTION INTRAVENOUS; SUBCUTANEOUS at 02:35

## 2018-02-20 RX ADMIN — HEPARIN SODIUM 5000 UNITS: 5000 INJECTION, SOLUTION INTRAVENOUS; SUBCUTANEOUS at 18:19

## 2018-02-20 RX ADMIN — VANCOMYCIN HYDROCHLORIDE 1500 MG: 10 INJECTION, POWDER, LYOPHILIZED, FOR SOLUTION INTRAVENOUS at 21:22

## 2018-02-20 RX ADMIN — HYDROMORPHONE HYDROCHLORIDE 0.5 MG: 2 INJECTION, SOLUTION INTRAMUSCULAR; INTRAVENOUS; SUBCUTANEOUS at 09:14

## 2018-02-20 RX ADMIN — VANCOMYCIN HYDROCHLORIDE 1500 MG: 10 INJECTION, POWDER, LYOPHILIZED, FOR SOLUTION INTRAVENOUS at 05:38

## 2018-02-20 RX ADMIN — HEPARIN SODIUM 5000 UNITS: 5000 INJECTION, SOLUTION INTRAVENOUS; SUBCUTANEOUS at 10:30

## 2018-02-20 RX ADMIN — OXYCODONE HYDROCHLORIDE AND ACETAMINOPHEN 2 TABLET: 5; 325 TABLET ORAL at 12:55

## 2018-02-20 RX ADMIN — POTASSIUM CHLORIDE 40 MEQ: 20 TABLET, EXTENDED RELEASE ORAL at 10:30

## 2018-02-20 RX ADMIN — VANCOMYCIN HYDROCHLORIDE 1500 MG: 10 INJECTION, POWDER, LYOPHILIZED, FOR SOLUTION INTRAVENOUS at 12:47

## 2018-02-20 NOTE — PROGRESS NOTES
Infectious Disease Progress Note    Today's Date: 2018   Admit Date: 2018    Impression:   · MRSA bacteremia ( - ); followup cultures from  are negative to date  · MRSA pyomyositis left leg  · MRSA left knee septic arthritis, aspirated 18  · Recent lumbar spine surgery with hardware, 10/2017 (in [place])  · Elevated LFTs - hep panel negative; ? NAFLD  · Microcytic anemia with elevated ferritin - likely Chronic disease anemia or inflammatory anemia  · Thrombocytosis    Plan:   · Continue IV vancomycin. · Follow pending blood cultures  · cardiology has seen and is planning MANJINDER  · Plans noted for repeat knee I&D tomorrow by Dr. Siri Stanley [place]    Anti-infectives:   IV vancomycin    Subjective:   He is ambulating in the room with difficulty; afebrile    No Known Allergies     Review of Systems:  Pertinent items are noted in the History of Present Illness. Objective:     Visit Vitals    /82 (BP 1 Location: Right arm, BP Patient Position: At rest;Supine)    Pulse 99    Temp 99.1 °F (37.3 °C)    Resp 14    Ht 5' 8\" (1.727 m)    Wt 113.4 kg (250 lb)    SpO2 97%    BMI 38.01 kg/m2     Temp (24hrs), Av.9 °F (37.2 °C), Min:98.5 °F (36.9 °C), Max:99.5 °F (37.5 °C)       Lines:  Peripheral IV:            Physical Exam:    General:  Alert, cooperative, in bed   Eyes:  Sclera anicteric   Mouth/Throat: oral pharynx clear   Neck: Supple   Lungs:   Breathing comfortably   CV:  Tachycardic   Abdomen:   non-distended   Extremities: No cyanosis; severe LLE edema   Skin: LLE venous stasis changes noted   Lymph nodes:    Musculoskeletal: Left lateral thigh wound VAC.   Left knee is mildly swollen   Lines/Devices:  Intact, no erythema, drainage or tenderness   Psych: Alert and responsive; mentating appropriately       Data Review:     CBC:  Recent Labs      18   0642  18   0618  18   0606   WBC  16.0*  13.1*  17.8*   GRANS  74  67  77   MONOS  8  10  8   EOS  2  2  0*   ANEU 12.0*  8.7*  13.5*   ABL  2.2  2.4  2.3   HGB  8.3*  7.6*  8.5*   HCT  27.6*  25.2*  26.6*   PLT  741*  660*  736*       BMP:  Recent Labs      02/20/18   0642  02/19/18 0618  02/18/18   0606   CREA  1.12  1.09  0.94   BUN  13  15  13   NA  141  139  137   K  3.4*  3.8  4.0   CL  105  103  101   CO2  25  27  26   AGAP  11  9  10   GLU  102*  99  98       LFTS:  Recent Labs      02/20/18   0642  02/19/18 0618 02/18/18   0606   TBILI  0.5  0.5  0.8   ALT  109*  117*  120*   SGOT  58*  77*  73*   AP  169*  158*  176*   TP  8.4*  7.2  8.1   ALB  2.5*  2.2*  2.5*       Microbiology:     All Micro Results     Procedure Component Value Units Date/Time    CULTURE, Tylleonie Nephew STAIN [[id number]]  (Abnormal) Collected:  02/17/18 0940    Order Status:  Completed Specimen:  Wound from Knee  Updated:  02/20/18 0749     Special Requests: LEFT        GRAM STAIN 2 TO 10 WBC'S/OIF      NO DEFINITE ORGANISM SEEN        Culture result:         LIGHT STAPHYLOCOCCUS AUREUS (A)            For Susceptibility Refer to Culture  [facility] NO [id number]      CULTURE, Larry Nephew STAIN [[id number]]  (Abnormal)  (Susceptibility) Collected:  02/17/18 0920    Order Status:  Completed Specimen:  Wound from Knee  Updated:  02/20/18 0747     Special Requests: LEFT        GRAM STAIN 25 TO 50 WBC'S/OIF      NO DEFINITE ORGANISM SEEN        Culture result:         SCANT **METHICILLIN RESISTANT STAPHYLOCOCCUS AUREUS** Use Rifampin only in conjunction with another antimicrobial agent. Do not use as Monotherapy.  (A)      PATIENT IS A KNOWN MRSA       CULTURE, BLOOD [[id number]] Collected:  02/19/18 0618    Order Status:  Completed Specimen:  Blood from Blood Updated:  02/20/18 0718     Special Requests: --        LEFT  Antecubital       Culture result: NO GROWTH 1 DAY       CULTURE, BLOOD [[id number]] Collected:  02/19/18 0622    Order Status:  Completed Specimen:  Blood from Blood Updated:  02/20/18 0718     Special Requests: --        LEFT  HAND Culture result: NO GROWTH 1 DAY       CULTURE, BODY FLUID Brodie Yimi STAIN [[id number]]  (Abnormal)  (Susceptibility) Collected:  02/16/18 2004    Order Status:  Completed Specimen:  Knee  Updated:  02/20/18 0702     Special Requests: --        LEFT  KNEE       GRAM STAIN         TOO NUMEROUS TO COUNT WBC'S/OIF      RARE GRAM POSITIVE COCCI        Culture result:         LIGHT **METHICILLIN RESISTANT STAPHYLOCOCCUS AUREUS** Use Rifampin only in conjunction with another antimicrobial agent. Do not use as Monotherapy. (A)      PATIENT IS A KNOWN MRSA       CULTURE, BLOOD [[id number]]  (Abnormal)  (Susceptibility) Collected:  02/16/18 0846    Order Status:  Completed Specimen:  Blood from Blood Updated:  02/19/18 1427     Special Requests: --        LEFT  Antecubital       GRAM STAIN GRAM POSITIVE COCCI                 AEROBIC AND ANAEROBIC BOTTLES              RESULTS VERIFIED, PHONED TO AND READ BACK BY Keren Lombardo RN AT [address] [place] [address] ON [id number]      Culture result:         **METHICILLIN RESISTANT STAPHYLOCOCCUS AUREUS** Use Rifampin only in conjunction with another antimicrobial agent. Do not use as Monotherapy.  (A)      PATIENT IS A KNOWN MRSA         Melany Garcia RN AT 74 2.19.18     CULTURE, BODY FLUID Brodie Yimi STAIN [[id number]] Collected:  02/17/18 0945    Order Status:  Completed Specimen:  Knee  Updated:  02/19/18 0909     Special Requests: NO SPECIAL REQUESTS        GRAM STAIN 10 TO 20 WBC'S/OIF      NO DEFINITE ORGANISM SEEN        Culture result: NO GROWTH 2 DAYS       CULTURE, BODY FLUID Brodie Yimi STAIN [[id number]] Collected:  02/16/18 2003    Order Status:  Completed Specimen:  Knee  Updated:  02/19/18 0844     Special Requests: --        RIGHT  KNEE       GRAM STAIN 50  WBC'S/OIF      NO DEFINITE ORGANISM SEEN        Culture result: NO GROWTH 2 DAYS       CULTURE, ANAEROBIC [[id number]] Collected:  02/16/18 2003    Order Status:  Completed Specimen:  Knee  Updated:  02/19/18 0818     Special Requests: -- RIGHT  KNEE       Culture result:         NO ANAEROBIC GROWTH IN 2 DAYS    CULTURE, ANAEROBIC [[id number]] Collected:  02/16/18 2004    Order Status:  Completed Specimen:  Knee  Updated:  02/19/18 0818     Special Requests: --        LEFT  KNEE       Culture result:         SUBCULTURE IS NECESSARY TO DETERMINE PRESENCE OR ABSENCE OF ANAEROBIC BACTERIA IN THIS CULTURE. FURTHER REPORT TO FOLLOW AFTER INCUBATION OF SUBCULTURE. CULTURE, ANAEROBIC [[id number]] Collected:  02/17/18 0920    Order Status:  Completed Specimen:  Knee  Updated:  02/19/18 0818     Special Requests: LEFT        Culture result:         SUBCULTURE IS NECESSARY TO DETERMINE PRESENCE OR ABSENCE OF ANAEROBIC BACTERIA IN THIS CULTURE. FURTHER REPORT TO FOLLOW AFTER INCUBATION OF SUBCULTURE. CULTURE, ANAEROBIC [[id number]] Collected:  02/17/18 0940    Order Status:  Completed Specimen:  Knee  Updated:  02/19/18 0817     Special Requests: LEFT        Culture result:         SUBCULTURE IS NECESSARY TO DETERMINE PRESENCE OR ABSENCE OF ANAEROBIC BACTERIA IN THIS CULTURE. FURTHER REPORT TO FOLLOW AFTER INCUBATION OF SUBCULTURE.     CULTURE, ANAEROBIC [[id number]] Collected:  02/17/18 0945    Order Status:  Completed Specimen:  Knee  Updated:  02/19/18 0817     Special Requests: RIGHT     Culture result:         NO ANAEROBIC GROWTH IN 2 DAYS    CULTURE, BLOOD [[id number]]  (Abnormal) Collected:  02/16/18 0852    Order Status:  Completed Specimen:  Blood from Blood Updated:  02/19/18 0636     Special Requests: --        LEFT  HAND       GRAM STAIN GRAM POSITIVE COCCI                 AEROBIC AND ANAEROBIC BOTTLES              RESULTS VERIFIED, PHONED TO AND READ BACK BY Keren Lombardo RN AT 1345 ON [id number] ZF     Culture result: STAPHYLOCOCCUS AUREUS (A)               For Susceptibility Refer to Culture  [facility] NO [id number]      CULTURE, Alexandrea Kahn STAIN [[id number]] Collected:  02/17/18 0945    Order Status:  Canceled Specimen:  Wound from Knee  Updated:  02/17/18 1020    CULTURE, BODY FLUID W Al Faust [[id number]] Collected:  02/16/18 2000    Order Status:  Canceled Specimen:  Knee Fluid     CULTURE, Brody Pill STAIN [[id number]] Collected:  02/16/18 2000    Order Status:  Canceled Specimen:  Wound from Knee      CULTURE, BODY FLUID Kenna Box STAIN [[id number]] Collected:  02/16/18 1330    Order Status:  Canceled Specimen:  Knee Fluid     CULTURE, ANAEROBIC [[id number]] Collected:  02/16/18 1330    Order Status:  Canceled Specimen:  Knee      AFB CULTURE + SMEAR W/RFLX ID FROM CULTURE [[id number]] Collected:  02/16/18 1330    Order Status:  Canceled     CULTURE, BLOOD [[id number]]  (Abnormal) Collected:  02/14/18 1157    Order Status:  Completed Specimen:  Blood from Blood Updated:  02/16/18 0725     Special Requests: --        NO SPECIAL REQUESTS  LEFT  Antecubital       GRAM STAIN         GRAM POSITIVE COCCI AEROBIC BOTTLE POSITIVE              RESULTS VERIFIED, PHONED TO AND READ BACK BY [facility] FLOOR [id number]. SBRAZEL     Culture result: STAPHYLOCOCCUS AUREUS (A)         REFER TO [id number] FOR ID AND SUSCEPTIBILITY    CULTURE, BLOOD [[id number]]  (Abnormal) Collected:  02/14/18 1205    Order Status:  Completed Specimen:  Blood from Blood Updated:  02/16/18 0725     Special Requests: --        NO SPECIAL REQUESTS  LEFT  HAND       GRAM STAIN GRAM POSITIVE COCCI                 RESULTS VERIFIED, PHONED TO AND READ BACK BY [facility] FLOOR [id number]. SBRAZEL              AEROBIC AND ANAEROBIC BOTTLES     Culture result: STAPHYLOCOCCUS AUREUS (A)         REFER TO [id number] FOR ID AND SUSCEPTIBILITY    CULTURE, BLOOD [[id number]]  (Abnormal) Collected:  02/13/18 1638    Order Status:  Completed Specimen:  Blood from Blood Updated:  02/16/18 0725     Special Requests: --        LEFT  HAND       GRAM STAIN GRAM POSITIVE COCCI                 AEROBIC AND ANAEROBIC BOTTLES              CRITICAL RESULT NOT CALLED DUE TO PREVIOUS NOTIFICATION OF CRITICAL RESULT WITHIN THE LAST 24 HOURS.     Culture result: STAPHYLOCOCCUS AUREUS (A)               RESULTS VERIFIED, PHONED TO AND READ BACK BY  Oscar Tomas RN ON 2018 AT [facility]                MRSA target DNA sequences detected:  SA target DNA sequence detected within the sample. Test performed by PCR   Culture/Sensitivity to follow (A)      REFER TO [id number]    CULTURE, BLOOD [[id number]]  (Abnormal)  (Susceptibility) Collected:  18 1626    Order Status:  Completed Specimen:  Blood from Blood Updated:  18 0725     Special Requests: --        RIGHT  Antecubital       GRAM STAIN GRAM POSITIVE COCCI                 AEROBIC AND ANAEROBIC BOTTLES              RESULTS VERIFIED, PHONED TO AND READ BACK BY [facility] RN @5235 18 BY      Culture result:         **METHICILLIN RESISTANT STAPHYLOCOCCUS AUREUS** Use Rifampin only in conjunction with another antimicrobial agent. Do not use as Monotherapy. (A)              MRSA target DNA sequences detected:  SA target DNA sequence detected within the sample. Test performed by PCR   Culture/Sensitivity to follow (A)            RESULTS VERIFIED, PHONED TO AND READ BACK BY  ANGÉLICA RUTHERFORD AT 4503 ON 18 BY             Imagin/15/18 L knee film: IMPRESSION:  1. Large joint effusion and soft tissue edema without acute osseous abnormality. An infectious/inflammatory process of the left knee joint cannot be excluded  given the appearance. Recommend clinical correlation. 18 MRI left leg  IMPRESSION:  1. Multiloculated fluid collections within the left lower extremity most compatible with abscesses. A large joint effusion is present with enhancement as well as enhancement within the joint space therefore concerning for septic arthritis. There are no definite findings of osteomyelitis. 2. Large right joint effusion.     Signed By: Bunny Avila MD     2018

## 2018-02-20 NOTE — PROGRESS NOTES
called to tell me that the Uric Acid Lab test that was ordered on the body fluid from this pt was too viscous to obtain. Called Dr. Jon Godfrey cell phone (I did not leave a message) & I called his RN at the [address] office & did leave a message for her concerning this matter. Waiting for RN to call me back & also placed this note.

## 2018-02-20 NOTE — PROGRESS NOTES
Problem: Falls - Risk of  Goal: *Absence of Falls  Document Kinjal Fall Risk and appropriate interventions in the flowsheet.    Outcome: Progressing Towards Goal  Fall Risk Interventions:  Mobility Interventions: Patient to call before getting OOB, Utilize walker, cane, or other assitive device    Mentation Interventions: Adequate sleep, hydration, pain control, Toileting rounds    Medication Interventions: Patient to call before getting OOB, Teach patient to arise slowly    Elimination Interventions: Call light in reach, Patient to call for help with toileting needs

## 2018-02-20 NOTE — DISCHARGE SUMMARY
Hospitalist Discharge Summary     Patient ID:  Yash Felder  [id number]  59 y.o.  1955  Admit date: 2/13/2018  2:06 PM  Discharge date and time: 2/20/2018  Attending: Malcolm Richard MD  PCP:  None  Treatment Team: Attending Provider: Malcolm Richard MD; Utilization Review: Jules Bailey RN; Consulting Provider: Namrata Pepe MD; Consulting Provider: Dennise Starr MD; Physician: Dennise Starr MD; Consulting Provider: Earl Leavitt MD; Consulting Provider: Keaton Gutierrez MD    Principal Diagnosis Septic arthritis Left knee   Active Problems:    Leukocytosis (2/13/2018)      Hypoalbuminemia (2/13/2018)      Anemia (2/13/2018)      Left leg cellulitis (2/13/2018)      Abnormal transaminases (2/13/2018)      Thrombocytosis ([address] (2/13/2018)      Cellulitis (2/15/2018)           Hospital Course:  Please refer to the admission H&P for details of presentation. In summary, the patient:  Jasmina Mazariegos a 57 y. o. male who has no significant PMH who was admitted on 2/13/18 for left leg non purulent cellulitis. Of note, he lives in [place] and came to visit family members to [place], [place] on 2/9/18. He denied falls, prior knee or leg surgery. Duplex was was unremarkable for DVT. He was started on vancomycin. Has persistent Blood cultures positive for MRSA bacteremia. ECHO negative for vegetations. Due to poor improvement of his thigh and leg edema and pain he underwent an MRI of his leg showing multiloculated abscesses and findings of septic knee. He underwent I&D and knee lavage on 2/17/18 by general surgery. Orthopedics performed knee aspiration. So far, all BC and wound aspirate and cultures are positive for staph aureus. ID on board. Plan for outpatient antibiotic completion at SNF/Rehab and wound care once Blood cultures are negative and he gets a PICC line. Patient still has persistent leukocytosis.  He is being transferred to [facility] for further surgical intervention by Orthopedic group- Dr. Sonia Lopez. Significant Diagnostic Studies:     Labs: Results:       Chemistry Recent Labs      02/20/18   0642 02/19/18 0618 02/18/18   0606   GLU  102*  99  98   NA  141  139  137   K  3.4*  3.8  4.0   CL  105  103  101   CO2  25  27  26   BUN  13  15  13   CREA  1.12  1.09  0.94   CA  10.0  9.2  10.5*   AGAP  11  9  10   AP  169*  158*  176*   TP  8.4*  7.2  8.1   ALB  2.5*  2.2*  2.5*   GLOB  5.9*  5.0*  5.6*   AGRAT  0.4*  0.4*  0.4*      CBC w/Diff Recent Labs      02/20/18 0642 02/19/18 0618 02/18/18   0606   WBC  16.0*  13.1*  17.8*   RBC  3.40*  3.18*  3.35*   HGB  8.3*  7.6*  8.5*   HCT  27.6*  25.2*  26.6*   PLT  741*  660*  736*   GRANS  74  67  77   LYMPH  14  18  13   EOS  2  2  0*      Cardiac Enzymes No results for input(s): CPK, CKND1, REFUGIO in the last 72 hours. No lab exists for component: CKRMB, TROIP   Coagulation No results for input(s): PTP, INR, APTT in the last 72 hours. No lab exists for component: INREXT    Lipid Panel No results found for: CHOL, CHOLPOCT, CHOLX, CHLST, CHOLV, 695665, HDL, LDL, LDLC, DLDLP, 132518, VLDLC, VLDL, TGLX, TRIGL, TRIGP, TGLPOCT, CHHD, CHHDX   BNP No results for input(s): BNPP in the last 72 hours. Liver Enzymes Recent Labs      02/20/18   0642   TP  8.4*   ALB  2.5*   AP  169*   SGOT  58*      Thyroid Studies No results found for: T4, T3U, TSH, TSHEXT         Discharge Exam:  Visit Vitals    /81 (BP 1 Location: Right arm, BP Patient Position: At rest;Supine)    Pulse (!) 109  Comment: RN notified    Temp 99.5 °F (37.5 °C)    Resp 15    Ht 5' 8\" (1.727 m)    Wt 113.4 kg (250 lb)    SpO2 99%    BMI 38.01 kg/m2     General appearance: alert, cooperative, no distress, appears stated age  Lungs: clear to auscultation bilaterally  Heart: regular rate and rhythm, S1, S2 normal, no murmur, click, rub or gallop  Abdomen: soft, non-tender.  Bowel sounds normal. No masses,  no organomegaly  Extremities: LLE: covered with wound VAC.  Pulses present, no cyanosis   Neurologic: Grossly normal     Disposition: [address]  Discharge Condition: stable  Patient Instructions:   Current Discharge Medication List        Current Facility-Administered Medications:     HYDROmorphone (PF) (DILAUDID) injection 1 mg, 1 mg, IntraVENous, WOUND PRN, Karine Turner MD    oxyCODONE-acetaminophen (PERCOCET) 5-325 mg per tablet 2 Tab, 2 Tab, Oral, Q4H PRN, Lia Paige MD, 2 Tab at 02/20/18 1255    oxyCODONE-acetaminophen (PERCOCET) 5-325 mg per tablet 1 Tab, 1 Tab, Oral, Q4H PRN, Lia Paige MD, 1 Tab at 02/18/18 2045    vancomycin (VANCOCIN) 1500 mg in  ml infusion, 1,500 mg, IntraVENous, Q8H, Karine Turner MD, Last Rate: 333.3 mL/hr at 02/21/18 0512, 1,500 mg at 02/21/18 0512    hydrALAZINE (APRESOLINE) 20 mg/mL injection 20 mg, 20 mg, IntraVENous, Q6H PRN, Karine Turner MD    HYDROmorphone (PF) (DILAUDID) injection 0.5 mg, 0.5 mg, IntraVENous, Q6H PRN, Karine Turner MD, 0.5 mg at 02/20/18 0914    naloxone [facility]) injection 0.4 mg, 0.4 mg, IntraVENous, EVERY 2 MINUTES AS NEEDED, Karine Turner MD    0.9% sodium chloride infusion 250 mL, 250 mL, IntraVENous, PRN, Lia Paige MD    lip protectant (BLISTEX) ointment, , Topical, PRN, Karine Turner MD    heparin (porcine) injection 5,000 Units, 5,000 Units, SubCUTAneous, Q8H, Karine Turner MD, Stopped at 02/21/18 0247    acetaminophen (TYLENOL) tablet 650 mg, 650 mg, Oral, Q6H PRN, Karine Turner MD, 650 mg at 02/18/18 2313    traMADol (ULTRAM) tablet 50 mg, 50 mg, Oral, Q6H PRN, Karine Turner MD, 50 mg at 02/16/18 1754      Activity: See surgical instructions  Diet: Regular Diet  Wound Care: As directed      Time spent to discharge patient 35 minutes  Signed:  Cecil Weaver MD  2/21/18

## 2018-02-20 NOTE — PROGRESS NOTES
Shift assessment complete; pt sitting in a recliner at bedside. Alert & oriented. Resp even & unlabored on room air; lungs clear. HR slightly tachy; S1, S2 auscultated. Abdomen soft with active bowel sounds. Left upper leg swollen & tender with wound vac in place & functional. LE with 2+ edema; pedal pulse palpable; extremity cool. States he has left leg pain 5/10 at this time & that he would like pain medication after breakfast. Call light in reach; recliner locked; will continue to monitor.

## 2018-02-20 NOTE — ADT AUTH CERT NOTES
Utilization Review           Septic Arthritis - Care Day 6 (2/18/2018) by Aurelio Barajas RN        Review Status Review Entered       Completed 2/19/2018       Details              Care Day: 6 Care Date: 2/18/2018 Level of Care:        Guideline Day 1        Level Of Care       (X) Floor [A]              Clinical Status       (X) * Clinical Indications met [B]       2/19/2018 4:58 PM EST by Karthik Mayo         Sepsis due to Left leg with multiloculated abscess /  septic arthritis/ MRSA bacteremia              (X) Probable fever, joint pain, and swelling       2/19/2018 4:58 PM EST by Karthik Mayo         LLE: covered with VAC., T 101.5, PAIN 7/10                     Routes       (X) Parenteral medications       2/19/2018 4:58 PM EST by Karthik Mayo         HEPARIN SQ, TYLENOL, DILAUDID IV, PERCOCET,                     Interventions       (X) WBC, ESR, C-reactive protein       2/19/2018 4:58 PM EST by Karthik Mayo         WBC: 17.8 (H), RBC: 3.35 (L), HGB: 8.5 (L), HCT: 26.6 (L), PLATELET: 137 (H), Calcium: 10.5 (H), Albumin: 2.5 (L), Globulin: 5.6 (H), A-G Ratio: 0.4 (L), ALT (SGPT): 120 (H), AST: 73 (H), Alk. phosphatase: 176 (H),              (X) Blood cultures       2/19/2018 4:58 PM EST by Rea Watson   So far, all BC and wound aspirate and cultures are positive for staph aureus.              (X) Joint aspiration, fluid analysis, and culture       2/19/2018 4:58 PM EST by Karthik Mayo         So far, all BC and wound aspirate and cultures are positive for staph aureus.              (X) Possible arthroscopic drainage or debridement       2/19/2018 4:58 PM EST by Karthik Mayo         He underwent I&D and knee lavage on 2/17/18 by general surgery.  Orthopedics performed knee aspiration                     Medications       (X) Parenteral antibiotics       2/19/2018 4:58 PM EST by Karthik Mayo         VANCO 1500 MG IV Q 8, ZOSYN 4.5 GMS IV Q 8,                     2/19/2018 4:58 PM EST by Ansley Chavez       Subject: Additional Clinical Information       Plan:1. Sepsis due to Left leg with multiloculated abscess /   septic arthritis/ MRSA bacteremia: S/P I&D periarticular and supraarticular abscesses- continue vancomycin- f/u blood and wound cultures- ID F/U- ortho and general surgery consult- cardiology consult for MANJINDER to r/o infective endocarditis     2.Thrombocytosis: possible to stress / infection- monitor daily   3.Hypoalbuminemia: s/p albumin iv total of 4 doses and   lasix   4.Transaminitis and elevated alk phosphatase: liver US was normal- hepatitis panel negative- likely due to bacteremia- monitor   5.Anemia: unknown if SEGUNDO / chronic anemia: low iron stores- may need iron transfusion once his infection is clear as outpatient   6.Obesity: counseling     -DVT ppx: heparin, GCS over right leg    Full code [facility] manager on board.  For SNF once he is medically ready     HR , /54, SAT 97% RA, PAIN 7/10                                   * Milestone                  Cellulitis - Care Day 5 (2/17/2018) by Wing Michelle RN        Review Status Review Entered       Completed 2/19/2018       Details              Care Day: 5 Care Date: 2/17/2018 Level of Care: Inpatient Floor       Guideline Day 2        Level Of Care       (X) Floor              Clinical Status       ( ) * Dehydration absent       (X) * Mental status at baseline       2/19/2018 4:29 PM EST by Ansley Chavez         Within defined limits              ( ) * Hemodynamic stability       2/19/2018 4:29 PM EST by Ansley Chavez         , /72, SAT RA 97%              (X) * Fever absent or improved       2/19/2018 4:29 PM EST by Ansley Chavez         98                     Routes       ( ) * Oral hydration, medications       2/19/2018 4:29 PM EST by Ansley Chavez         HEPARIN SQ Q 8, PERCOCET, DILAUDID IV X 4,                     Interventions       (X) WBC       2/19/2018 4:29 PM EST by Verenice Gibson WBC: 14.4 (H) RBC: 3.70 (L) HGB: 8.8 (L) HCT: 29.0 (L) PLATELET: 294 (H)              (X) Elevation of affected area, if possible              Medications       (X) IV antibiotics       2/19/2018 4:29 PM EST by Chato Cornejo         VANCO 1500 MG IV Q 8, ZOSYN 4.5 GMS IV Q 8,                     2/19/2018 4:29 PM EST by Chato Cornejo       Subject: Additional Clinical Information       Date of Procedure: 2/17/2018 Preoperative Diagnosis: LLE ABSCESSESPostoperative Diagnosis: PERIARTICULAR AND SUPRAARTICULAR ABSCESSESProcedure(s):INCISION AND DRAINAGE OF LEFT LOWER EXTREMITY PERIARTICULAR AND SUPRAARTICULAR ABSCESSESThe left knee and thigh were drained by Dr. Dalia Erickson. This should help him a great deal. The right knee is painful and swollen still. I did another aspiration of the right kneebut the fluid is clear and does not appear to be infected. I did send it for culture as well.   There was about 50 ml of fluid.   Glucose: 108 (H),Protein, total: 8.9 (H),Albumin: 2.6 (L),Globulin: 6.3 (H),A-G Ratio: 0.4 (L),ALT (SGPT): 175 (H),AST: 145 (H),Alk. phosphatase: 226 (H),WOUND, ANEROBIC,FLUID CX,                                   * Milestone                  Cellulitis - Care Day 4 (2/16/2018) by Flex Cole RN        Review Status Review Entered       Completed 2/16/2018       Details              Care Day: 4 Care Date: 2/16/2018 Level of Care: Inpatient Floor       Guideline Day 2        Level Of Care       (X) Floor              Clinical Status       ( ) * Dehydration absent       (X) * Mental status at baseline       2/16/2018 4:52 PM EST by Chato Cornejo         Alert and responsive              ( ) * Hemodynamic stability       2/16/2018 4:52 PM EST by Chato Cornejo         , /70, % RA, PAIN 5/10              (X) * Fever absent or improved       2/16/2018 4:52 PM EST by Chato Cornejo         98. 2                     Activity       (X) Activity as tolerated       2/16/2018 4:52 PM EST by Neel Becker         UA                     Routes       ( ) * Oral hydration, medications       2/16/2018 4:52 PM EST by Neel Becker         LASIX 20 MG IV QD, HEPARIN SQ Q 8, ULTRAM, ALBUMIN IV,              (X) Diet as tolerated       2/16/2018 4:52 PM EST by Neel Becker         REG                     Interventions       (X) WBC       2/16/2018 4:52 PM EST by Neel Becker         WBC: 12.4 (H) NRBC: 2.0 RBC: 3.26 (L) HGB: 8.0 (L) HCT: 25.3 (L) PLATELET: 426 (H)              (X) Elevation of affected area, if possible              Medications       (X) IV antibiotics       2/16/2018 4:52 PM EST by Neel Becker         VANCO 1250 MG IV Q 8                     2/16/2018 4:52 PM EST by Neel Becker       Subject: Additional Clinical Information       MRI LLE PENDING, Glucose: 109 (H),Albumin: 2.4 (L),Globulin: 5.3 (H),A-G Ratio: 0.5 (L),ALT (SGPT): 125 (H),AST: 112 (H),Alk. phosphatase: 182 (H),BCX,IDImpression:MRSA bacteremia (2/13 - 2/14)Left knee pain / swelling concerning for possible septic kneeRecent lumbar spine surgery with hardware, 10/2017 (in [place])Elevated LFTs - hep panel negative; ? NAFLDMicrocytic anemia with elevated ferritin - likely Chronic disease anemia or inflammatory anemiaThrombocytosis  Plan:Continue IV vancomycin. Repeat blood cultures today. He may need MANJINDER but defer until next week. He seems a bit confused so will need to keep an eye on him. Will get MRI of left leg to clarify what's going on. Defer MRI of the spine - no back pain and he is able to flex hips without pain. Ricki Givens his knee does not have an effusion. His whole leg is swollen in a fashion I associate with low oncotic pressure of hypoalbuminemia. He is profoundly malnourished based on his labs and clinical history. Based on my clinical exam today I don not think he has a septic knee or an abcess in his left lower extremity.                                     * Milestone

## 2018-02-20 NOTE — CONSULTS
Discussed case with Dr RODRIGUEZ [facility] regarding left thigh wound. Wound is open at this point. Patient is being treated by ID for left thigh/knee infection. Final cultures pending. As the abscess/knee has were irrigated/debrided and patient is being appropriately treated with antibiotics would recommend delay loose wound closure prior to DC. Leaving this wound open would result in a wound that will take a very long time to heal whereas I feel a delayed loose wound closure wound provide a more easily manageable wound as should not result in recurrent abscess formation.

## 2018-02-20 NOTE — PROGRESS NOTES
Informed Dr. Bala Arriaga about pt's high HR over the past 24 hours. A cardiology consult has been called & pt is to be transferred to [facility] for further treatment of his wound. Pt is in stable condition with no acute signs of distress noted.

## 2018-02-20 NOTE — CONSULTS
[address] Cardiology Consult                Date of  Admission: 2/13/2018  2:06 PM     Primary Care Physician: MIKE  Primary Cardiologist: MIKE  Referring Physician: Hospital med  Consulting Physician: Dr. Nika Webber    CC/Reason for consult: MANJINDER in patient with MRSA bacteremia      Briana Seymour is a 57 y.o. male with no prior cardiac history. Patient presented to [facility] with left leg pain x 1-2 weeks and edema x 4 days. He lived in [place] and came to visit family members to [place], [place] on 2/9/18 via Bus. Labs in ED showed WBC 13, Hgb 8.5, plt 750, duplex LLE was unremarkable. He was started on vanco and zosyn. Hospitalist admitted patient on 2/13/18. Echo showed preserved LV function and no vegetation mentioned. ID seeing patient and all BC (2/13, 2/14 and 2/16), wd aspirations and cultures + for staph aureus. Cardiology was consulted for bacteremia. Pending transfer [place] for Dr. Audie Rico to close wounds over drains tomorrow. Diagnosis    Leukocytosis    Hypoalbuminemia    Anemia    Left leg cellulitis    Abnormal transaminases    Thrombocytosis (HCC)    Cellulitis       History reviewed. No pertinent past medical history. Past Surgical History:   Procedure Laterality Date    HX ORTHOPAEDIC      back     No Known Allergies   History reviewed. No pertinent family history.      Current Facility-Administered Medications   Medication Dose Route Frequency    HYDROmorphone (PF) (DILAUDID) injection 1 mg  1 mg IntraVENous WOUND PRN    oxyCODONE-acetaminophen (PERCOCET) 5-325 mg per tablet 2 Tab  2 Tab Oral Q4H PRN    oxyCODONE-acetaminophen (PERCOCET) 5-325 mg per tablet 1 Tab  1 Tab Oral Q4H PRN    vancomycin (VANCOCIN) 1500 mg in  ml infusion  1,500 mg IntraVENous Q8H    hydrALAZINE (APRESOLINE) 20 mg/mL injection 20 mg  20 mg IntraVENous Q6H PRN    HYDROmorphone (PF) (DILAUDID) injection 0.5 mg  0.5 mg IntraVENous Q6H PRN    naloxone (NARCAN) injection 0.4 mg  0.4 mg IntraVENous EVERY 2 MINUTES AS NEEDED    0.9% sodium chloride infusion 250 mL  250 mL IntraVENous PRN    lip protectant (BLISTEX) ointment   Topical PRN    heparin (porcine) injection 5,000 Units  5,000 Units SubCUTAneous Q8H    acetaminophen (TYLENOL) tablet 650 mg  650 mg Oral Q6H PRN    traMADol (ULTRAM) tablet 50 mg  50 mg Oral Q6H PRN       Review of Systems   Constitution: Negative for diaphoresis, weakness and malaise/fatigue. HENT: Negative for congestion. Cardiovascular: Negative for chest pain, claudication, cyanosis, dyspnea on exertion, irregular heartbeat, leg swelling, near-syncope, orthopnea, palpitations, paroxysmal nocturnal dyspnea and syncope. Respiratory: Negative for cough, shortness of breath and wheezing. Endocrine: Negative for cold intolerance and heat intolerance. Hematologic/Lymphatic: Does not bruise/bleed easily. Skin: Negative for nail changes. Musculoskeletal:        + leg pain   Neurological: Negative for dizziness and headaches.         Physical Exam  Vitals:    02/19/18 2328 02/20/18 0242 02/20/18 0800 02/20/18 1147   BP: 135/80 134/82 131/85 137/81   Pulse: (!) 114 (!) 110 (!) 107 (!) 109   Resp: 18 18 20 15   Temp: 98.5 °F (36.9 °C) 98.7 °F (37.1 °C) 98.8 °F (37.1 °C) 99.5 °F (37.5 °C)   SpO2: 99% 98% 100% 99%   Weight:       Height:           Physical Exam:  General: Well Developed, Well Nourished, No Acute Distress  HEENT: pupils equal and round, no abnormalities noted  Neck: supple, no JVD, no carotid bruits  Heart: S1S2 with RRR with mild tachycardia, without murmurs or gallops  Lungs: Clear throughout auscultation bilaterally without adventitious sounds  Abd: soft, nontender, nondistended, with good bowel sounds  Ext: warm, +1 edema left leg with wound vac intact, calves supple/nontender, pulses 2+ bilaterally  Skin: warm and dry  Psychiatric: Normal mood and affect  Neurologic: Alert and oriented X 3    Cardiographics    Telemetry: NA  ECG: Sinus tachycardia Otherwise normal ECG   Echocardiogram: -  Left ventricle: Systolic function was normal. Ejection fraction was estimated in the range of 55 % to 60 %. There were no regional wall motion abnormalities. There was mild concentric hypertrophy. The E/e' ratio was 8.3. -  Pericardium: There was no pericardial effusion. Labs:   Recent Labs      02/20/18   0642  02/19/18   0618   NA  141  139   K  3.4*  3.8   BUN  13  15   CREA  1.12  1.09   GLU  102*  99   WBC  16.0*  13.1*   HGB  8.3*  7.6*   HCT  27.6*  25.2*   PLT  741*  660*        Assessment/Plan:     Assessment:      Active Problems:    Leukocytosis (2/13/2018)      Hypoalbuminemia (2/13/2018)      Anemia (2/13/2018)      Left leg cellulitis (2/13/2018)      Abnormal transaminases (2/13/2018)      Thrombocytosis (HCC) (2/13/2018)      Cellulitis (2/15/2018)    MRSA bacteremia- echo with preserved LV function and no vegetation seen; pending transfer downtown for wound closure surgery tomorrow with Dr. Sharmila Guerra. Will set up MANJINDER thereafter. Thank you very much for this referral. We appreciate the opportunity to participate in this patient's care. We will follow along with above stated plan. Francis Goncalves NP  Consulting MD: Dr. Sahil Mccain    I have personally seen and examined patient and agree with above assessment. I agree and confirm with findings with additional details/exceptions as listed below:    No prior cardiac history. +MRSA bacteremia with likely source lt leg wound/abscess-pending surgery. Echo with preserved EF and  No evidence of vegetations.  Will plan on MANJINDER post surgery per ID request with regard to antibiotics duration    Erika Hudson MD

## 2018-02-20 NOTE — PROGRESS NOTES
General Surgery Progress Note    2/20/2018    Admit Date: 2/13/2018    Subjective:     Surgery POD #3  [facility] remains at the [facility] [place] [address] [facility] room 356. Left knee better. WBC count now 16. Dr. Gerard Douglas saw the patient recommending closure of the open wound. I saw Dr. Sonia Lopez last night discussed the situation with him and he agreed to washout the patient's knee and close the wound over drains in the afternoon of 2/21/18. He asked that the patient be transferred to the Hospitalist Service at the [facility]. I talked to Dr. Bala Arriaga, of the hospitalist service, who said she would arrange the transfer. I spoke to the patient telling him Dr. Sonia Lopez was the best person to perform his surgery. I have told him over the past few days that I do not feel comfortable caring for this problem as it is something I have never done. Objective:     Visit Vitals    /85 (BP 1 Location: Right arm, BP Patient Position: At rest)    Pulse (!) 107    Temp 98.8 °F (37.1 °C)    Resp 20    Ht 5' 8\" (1.727 m)    Wt 250 lb (113.4 kg)    SpO2 100%    BMI 38.01 kg/m2         Intake/Output Summary (Last 24 hours) at 02/20/18 0911  Last data filed at 02/19/18 2055   Gross per 24 hour   Intake                0 ml   Output             1985 ml   Net            -1985 ml        EXAM:  Extr: left leg wound dressing intact. L foot still swollen. Data Review    Recent Results (from the past 24 hour(s))   PLEASE READ & DOCUMENT PPD TEST IN 24 HRS    Collection Time: 02/19/18  6:50 PM   Result Value Ref Range    PPD  Negative    mm Induration  0  mm   HEPATIC FUNCTION PANEL    Collection Time: 02/20/18  6:42 AM   Result Value Ref Range    Protein, total 8.4 (H) 6.3 - 8.2 g/dL    Albumin 2.5 (L) 3.2 - 4.6 g/dL    Globulin 5.9 (H) 2.3 - 3.5 g/dL    A-G Ratio 0.4 (L) 1.2 - 3.5      Bilirubin, total 0.5 0.2 - 1.1 MG/DL    Bilirubin, direct 0.2 <0.4 MG/DL    Alk.  phosphatase 169 (H) 50 - 136 U/L    AST (SGOT) 58 (H) 15 - 37 U/L    ALT (SGPT) 109 (H) 12 - 65 U/L   CBC WITH AUTOMATED DIFF    Collection Time: 02/20/18  6:42 AM   Result Value Ref Range    WBC 16.0 (H) 4.3 - 11.1 K/uL    RBC 3.40 (L) 4.23 - 5.67 M/uL    HGB 8.3 (L) 13.6 - 17.2 g/dL    HCT 27.6 (L) 41.1 - 50.3 %    MCV 81.2 79.6 - 97.8 FL    MCH 24.4 (L) 26.1 - 32.9 PG    MCHC 30.1 (L) 31.4 - 35.0 g/dL    RDW 17.8 (H) 11.9 - 14.6 %    PLATELET 930 (H) 652 - 450 K/uL    MPV 9.3 (L) 10.8 - 14.1 FL    DF AUTOMATED      NEUTROPHILS 74 43 - 78 %    LYMPHOCYTES 14 13 - 44 %    MONOCYTES 8 4.0 - 12.0 %    EOSINOPHILS 2 0.5 - 7.8 %    BASOPHILS 0 0.0 - 2.0 %    IMMATURE GRANULOCYTES 2 0.0 - 5.0 %    ABS. NEUTROPHILS 12.0 (H) 1.7 - 8.2 K/UL    ABS. LYMPHOCYTES 2.2 0.5 - 4.6 K/UL    ABS. MONOCYTES 1.3 0.1 - 1.3 K/UL    ABS. EOSINOPHILS 0.3 0.0 - 0.8 K/UL    ABS. BASOPHILS 0.0 0.0 - 0.2 K/UL    ABS. IMM. GRANS. 0.3 0.0 - 0.5 K/UL   METABOLIC PANEL, BASIC    Collection Time: 02/20/18  6:42 AM   Result Value Ref Range    Sodium 141 136 - 145 mmol/L    Potassium 3.4 (L) 3.5 - 5.1 mmol/L    Chloride 105 98 - 107 mmol/L    CO2 25 21 - 32 mmol/L    Anion gap 11 7 - 16 mmol/L    Glucose 102 (H) 65 - 100 mg/dL    BUN 13 8 - 23 MG/DL    Creatinine 1.12 0.8 - 1.5 MG/DL    GFR est AA >60 >60 ml/min/1.73m2    GFR est non-AA >60 >60 ml/min/1.73m2    Calcium 10.0 8.3 - 10.4 MG/DL        Hospital Problems  Date Reviewed: 2/13/2018          Codes Class Noted POA    Cellulitis ICD-10-CM: L03.90  ICD-9-CM: 682.9  2/15/2018 Unknown        Leukocytosis ICD-10-CM: D72.829  ICD-9-CM: 288.60  2/13/2018 Yes        Hypoalbuminemia ICD-10-CM: E88.09  ICD-9-CM: 273.8  2/13/2018 Yes        Anemia ICD-10-CM: D64.9  ICD-9-CM: 285.9  2/13/2018 Yes        Left leg cellulitis ICD-10-CM: L03.116  ICD-9-CM: 682.6  2/13/2018 Yes        Abnormal transaminases ICD-10-CM: R74.8  ICD-9-CM: 790.4  2/13/2018 Yes        Thrombocytosis ([address] ICD-10-CM: D47.3  ICD-9-CM: 238.71  2/13/2018 Yes          1. Transfer to [facility]  2.  Dr. Ever Quach to operate 2/21/18  Luz Whaley MD.

## 2018-02-21 ENCOUNTER — HOSPITAL ENCOUNTER (INPATIENT)
Age: 63
LOS: 14 days | Discharge: NURSING, REHAB OR LONG-TERM CARE | DRG: 853 | End: 2018-03-07
Attending: ORTHOPAEDIC SURGERY | Admitting: INTERNAL MEDICINE
Payer: COMMERCIAL

## 2018-02-21 ENCOUNTER — ANESTHESIA (OUTPATIENT)
Dept: SURGERY | Age: 63
DRG: 853 | End: 2018-02-21
Payer: COMMERCIAL

## 2018-02-21 ENCOUNTER — APPOINTMENT (OUTPATIENT)
Dept: GENERAL RADIOLOGY | Age: 63
DRG: 853 | End: 2018-02-21
Attending: ORTHOPAEDIC SURGERY
Payer: COMMERCIAL

## 2018-02-21 VITALS
RESPIRATION RATE: 19 BRPM | TEMPERATURE: 97 F | BODY MASS INDEX: 37.89 KG/M2 | OXYGEN SATURATION: 99 % | HEIGHT: 68 IN | HEART RATE: 97 BPM | SYSTOLIC BLOOD PRESSURE: 139 MMHG | WEIGHT: 250 LBS | DIASTOLIC BLOOD PRESSURE: 73 MMHG

## 2018-02-21 DIAGNOSIS — B95.62 MRSA BACTEREMIA: ICD-10-CM

## 2018-02-21 DIAGNOSIS — M00.9 PYOGENIC ARTHRITIS OF LEFT KNEE JOINT, DUE TO UNSPECIFIED ORGANISM (HCC): ICD-10-CM

## 2018-02-21 DIAGNOSIS — D50.9 IRON DEFICIENCY ANEMIA, UNSPECIFIED IRON DEFICIENCY ANEMIA TYPE: ICD-10-CM

## 2018-02-21 DIAGNOSIS — R78.81 MRSA BACTEREMIA: ICD-10-CM

## 2018-02-21 DIAGNOSIS — D72.829 LEUKOCYTOSIS, UNSPECIFIED TYPE: ICD-10-CM

## 2018-02-21 DIAGNOSIS — D63.8 ANEMIA IN OTHER CHRONIC DISEASES CLASSIFIED ELSEWHERE: ICD-10-CM

## 2018-02-21 DIAGNOSIS — M00.062 STAPHYLOCOCCAL ARTHRITIS OF LEFT KNEE (HCC): ICD-10-CM

## 2018-02-21 DIAGNOSIS — R74.8 ABNORMAL TRANSAMINASES: ICD-10-CM

## 2018-02-21 DIAGNOSIS — L02.416 ABSCESS OF LEFT LEG: ICD-10-CM

## 2018-02-21 DIAGNOSIS — L03.116 CELLULITIS OF LEFT LEG: ICD-10-CM

## 2018-02-21 DIAGNOSIS — D75.839 THROMBOCYTOSIS: ICD-10-CM

## 2018-02-21 LAB
ALBUMIN SERPL-MCNC: 2.5 G/DL (ref 3.2–4.6)
ALBUMIN/GLOB SERPL: 0.5 {RATIO} (ref 1.2–3.5)
ALP SERPL-CCNC: 147 U/L (ref 50–136)
ALT SERPL-CCNC: 84 U/L (ref 12–65)
ANION GAP SERPL CALC-SCNC: 9 MMOL/L (ref 7–16)
AST SERPL-CCNC: 45 U/L (ref 15–37)
BASOPHILS # BLD: 0 K/UL (ref 0–0.2)
BASOPHILS NFR BLD: 0 % (ref 0–2)
BILIRUB DIRECT SERPL-MCNC: 0.2 MG/DL
BILIRUB SERPL-MCNC: 0.6 MG/DL (ref 0.2–1.1)
BUN SERPL-MCNC: 12 MG/DL (ref 8–23)
CALCIUM SERPL-MCNC: 10 MG/DL (ref 8.3–10.4)
CHLORIDE SERPL-SCNC: 106 MMOL/L (ref 98–107)
CO2 SERPL-SCNC: 26 MMOL/L (ref 21–32)
CREAT SERPL-MCNC: 1.33 MG/DL (ref 0.8–1.5)
DIFFERENTIAL METHOD BLD: ABNORMAL
EOSINOPHIL # BLD: 0.3 K/UL (ref 0–0.8)
EOSINOPHIL NFR BLD: 2 % (ref 0.5–7.8)
ERYTHROCYTE [DISTWIDTH] IN BLOOD BY AUTOMATED COUNT: 17.9 % (ref 11.9–14.6)
GLOBULIN SER CALC-MCNC: 5.5 G/DL (ref 2.3–3.5)
GLUCOSE SERPL-MCNC: 107 MG/DL (ref 65–100)
HCT VFR BLD AUTO: 25.6 % (ref 41.1–50.3)
HGB BLD-MCNC: 7.7 G/DL (ref 13.6–17.2)
LYMPHOCYTES # BLD: 2 K/UL (ref 0.5–4.6)
LYMPHOCYTES NFR BLD: 14 % (ref 13–44)
MCH RBC QN AUTO: 24.2 PG (ref 26.1–32.9)
MCHC RBC AUTO-ENTMCNC: 30.1 G/DL (ref 31.4–35)
MCV RBC AUTO: 80.5 FL (ref 79.6–97.8)
MONOCYTES # BLD: 1.3 K/UL (ref 0.1–1.3)
MONOCYTES NFR BLD: 9 % (ref 4–12)
NEUTS SEG # BLD: 11 K/UL (ref 1.7–8.2)
NEUTS SEG NFR BLD: 75 % (ref 43–78)
PLATELET # BLD AUTO: 632 K/UL (ref 150–450)
PLATELET COMMENTS,PCOM: ABNORMAL
PMV BLD AUTO: 9.2 FL (ref 10.8–14.1)
POTASSIUM SERPL-SCNC: 4.1 MMOL/L (ref 3.5–5.1)
PROT SERPL-MCNC: 8 G/DL (ref 6.3–8.2)
RBC # BLD AUTO: 3.18 M/UL (ref 4.23–5.67)
RBC MORPH BLD: ABNORMAL
RBC MORPH BLD: ABNORMAL
SODIUM SERPL-SCNC: 141 MMOL/L (ref 136–145)
URATE SERPL-MCNC: 6.2 MG/DL (ref 2.6–6)
WBC # BLD AUTO: 14.6 K/UL (ref 4.3–11.1)
WBC MORPH BLD: ABNORMAL

## 2018-02-21 PROCEDURE — 84550 ASSAY OF BLOOD/URIC ACID: CPT | Performed by: FAMILY MEDICINE

## 2018-02-21 PROCEDURE — 74011250636 HC RX REV CODE- 250/636: Performed by: ANESTHESIOLOGY

## 2018-02-21 PROCEDURE — 74011250636 HC RX REV CODE- 250/636: Performed by: INTERNAL MEDICINE

## 2018-02-21 PROCEDURE — 77030002966 HC SUT PDS J&J -A: Performed by: ORTHOPAEDIC SURGERY

## 2018-02-21 PROCEDURE — 77030013727 HC IRR FAN PULSVC ZIMM -B: Performed by: ORTHOPAEDIC SURGERY

## 2018-02-21 PROCEDURE — 36415 COLL VENOUS BLD VENIPUNCTURE: CPT | Performed by: FAMILY MEDICINE

## 2018-02-21 PROCEDURE — 74011250637 HC RX REV CODE- 250/637: Performed by: ANESTHESIOLOGY

## 2018-02-21 PROCEDURE — 74011250637 HC RX REV CODE- 250/637: Performed by: FAMILY MEDICINE

## 2018-02-21 PROCEDURE — 85025 COMPLETE CBC W/AUTO DIFF WBC: CPT | Performed by: SURGERY

## 2018-02-21 PROCEDURE — 74011250636 HC RX REV CODE- 250/636

## 2018-02-21 PROCEDURE — 76010000161 HC OR TIME 1 TO 1.5 HR INTENSV-TIER 1: Performed by: ORTHOPAEDIC SURGERY

## 2018-02-21 PROCEDURE — 65270000029 HC RM PRIVATE

## 2018-02-21 PROCEDURE — 77030012406 HC DRN WND PENRS BARD -A: Performed by: ORTHOPAEDIC SURGERY

## 2018-02-21 PROCEDURE — 0JDP0ZZ EXTRACTION OF LEFT LOWER LEG SUBCUTANEOUS TISSUE AND FASCIA, OPEN APPROACH: ICD-10-PCS | Performed by: ORTHOPAEDIC SURGERY

## 2018-02-21 PROCEDURE — 76210000017 HC OR PH I REC 1.5 TO 2 HR: Performed by: ORTHOPAEDIC SURGERY

## 2018-02-21 PROCEDURE — 80076 HEPATIC FUNCTION PANEL: CPT | Performed by: SURGERY

## 2018-02-21 PROCEDURE — 77030008477 HC STYL SATN SLP COVD -A: Performed by: ANESTHESIOLOGY

## 2018-02-21 PROCEDURE — 77030020782 HC GWN BAIR PAWS FLX 3M -B: Performed by: ANESTHESIOLOGY

## 2018-02-21 PROCEDURE — 74011250636 HC RX REV CODE- 250/636: Performed by: FAMILY MEDICINE

## 2018-02-21 PROCEDURE — 77030011640 HC PAD GRND REM COVD -A: Performed by: ORTHOPAEDIC SURGERY

## 2018-02-21 PROCEDURE — 36415 COLL VENOUS BLD VENIPUNCTURE: CPT | Performed by: SURGERY

## 2018-02-21 PROCEDURE — 74011000250 HC RX REV CODE- 250

## 2018-02-21 PROCEDURE — 0JBP0ZZ EXCISION OF LEFT LOWER LEG SUBCUTANEOUS TISSUE AND FASCIA, OPEN APPROACH: ICD-10-PCS | Performed by: ORTHOPAEDIC SURGERY

## 2018-02-21 PROCEDURE — 77030018836 HC SOL IRR NACL ICUM -A: Performed by: ORTHOPAEDIC SURGERY

## 2018-02-21 PROCEDURE — 77030008703 HC TU ET UNCUF COVD -A: Performed by: ANESTHESIOLOGY

## 2018-02-21 PROCEDURE — 76060000033 HC ANESTHESIA 1 TO 1.5 HR: Performed by: ORTHOPAEDIC SURGERY

## 2018-02-21 PROCEDURE — 80048 BASIC METABOLIC PNL TOTAL CA: CPT | Performed by: SURGERY

## 2018-02-21 PROCEDURE — 74011250636 HC RX REV CODE- 250/636: Performed by: ORTHOPAEDIC SURGERY

## 2018-02-21 RX ORDER — VANCOMYCIN/0.9 % SOD CHLORIDE 1.5G/250ML
1500 PLASTIC BAG, INJECTION (ML) INTRAVENOUS EVERY 8 HOURS
Status: DISCONTINUED | OUTPATIENT
Start: 2018-02-21 | End: 2018-02-22

## 2018-02-21 RX ORDER — HYDRALAZINE HYDROCHLORIDE 20 MG/ML
20 INJECTION INTRAMUSCULAR; INTRAVENOUS
Status: DISCONTINUED | OUTPATIENT
Start: 2018-02-21 | End: 2018-02-25

## 2018-02-21 RX ORDER — FAMOTIDINE 20 MG/1
20 TABLET, FILM COATED ORAL ONCE
Status: COMPLETED | OUTPATIENT
Start: 2018-02-21 | End: 2018-02-21

## 2018-02-21 RX ORDER — OXYCODONE AND ACETAMINOPHEN 5; 325 MG/1; MG/1
2 TABLET ORAL
Status: DISCONTINUED | OUTPATIENT
Start: 2018-02-21 | End: 2018-03-07 | Stop reason: HOSPADM

## 2018-02-21 RX ORDER — HEPARIN SODIUM 5000 [USP'U]/ML
5000 INJECTION, SOLUTION INTRAVENOUS; SUBCUTANEOUS EVERY 8 HOURS
Status: CANCELLED | OUTPATIENT
Start: 2018-02-21

## 2018-02-21 RX ORDER — HEPARIN SODIUM 5000 [USP'U]/ML
5000 INJECTION, SOLUTION INTRAVENOUS; SUBCUTANEOUS EVERY 8 HOURS
Status: DISCONTINUED | OUTPATIENT
Start: 2018-02-21 | End: 2018-02-25

## 2018-02-21 RX ORDER — OXYCODONE HYDROCHLORIDE 5 MG/1
10 TABLET ORAL
Status: DISCONTINUED | OUTPATIENT
Start: 2018-02-21 | End: 2018-02-21 | Stop reason: HOSPADM

## 2018-02-21 RX ORDER — SODIUM CHLORIDE, SODIUM LACTATE, POTASSIUM CHLORIDE, CALCIUM CHLORIDE 600; 310; 30; 20 MG/100ML; MG/100ML; MG/100ML; MG/100ML
75 INJECTION, SOLUTION INTRAVENOUS CONTINUOUS
Status: DISCONTINUED | OUTPATIENT
Start: 2018-02-21 | End: 2018-02-21 | Stop reason: HOSPADM

## 2018-02-21 RX ORDER — FENTANYL CITRATE 50 UG/ML
INJECTION, SOLUTION INTRAMUSCULAR; INTRAVENOUS AS NEEDED
Status: DISCONTINUED | OUTPATIENT
Start: 2018-02-21 | End: 2018-02-21 | Stop reason: HOSPADM

## 2018-02-21 RX ORDER — OXYCODONE AND ACETAMINOPHEN 5; 325 MG/1; MG/1
1 TABLET ORAL
Status: DISCONTINUED | OUTPATIENT
Start: 2018-02-21 | End: 2018-03-07 | Stop reason: HOSPADM

## 2018-02-21 RX ORDER — LIDOCAINE HYDROCHLORIDE 10 MG/ML
0.1 INJECTION INFILTRATION; PERINEURAL AS NEEDED
Status: DISCONTINUED | OUTPATIENT
Start: 2018-02-21 | End: 2018-02-21 | Stop reason: HOSPADM

## 2018-02-21 RX ORDER — LIDOCAINE HYDROCHLORIDE 20 MG/ML
INJECTION, SOLUTION EPIDURAL; INFILTRATION; INTRACAUDAL; PERINEURAL AS NEEDED
Status: DISCONTINUED | OUTPATIENT
Start: 2018-02-21 | End: 2018-02-21 | Stop reason: HOSPADM

## 2018-02-21 RX ORDER — SUCCINYLCHOLINE CHLORIDE 20 MG/ML
INJECTION INTRAMUSCULAR; INTRAVENOUS AS NEEDED
Status: DISCONTINUED | OUTPATIENT
Start: 2018-02-21 | End: 2018-02-21 | Stop reason: HOSPADM

## 2018-02-21 RX ORDER — ROCURONIUM BROMIDE 10 MG/ML
INJECTION, SOLUTION INTRAVENOUS AS NEEDED
Status: DISCONTINUED | OUTPATIENT
Start: 2018-02-21 | End: 2018-02-21 | Stop reason: HOSPADM

## 2018-02-21 RX ORDER — ACETAMINOPHEN 325 MG/1
650 TABLET ORAL
Status: CANCELLED | OUTPATIENT
Start: 2018-02-21

## 2018-02-21 RX ORDER — HYDROMORPHONE HYDROCHLORIDE 2 MG/ML
0.5 INJECTION, SOLUTION INTRAMUSCULAR; INTRAVENOUS; SUBCUTANEOUS
Status: COMPLETED | OUTPATIENT
Start: 2018-02-21 | End: 2018-02-21

## 2018-02-21 RX ORDER — FENTANYL CITRATE 50 UG/ML
100 INJECTION, SOLUTION INTRAMUSCULAR; INTRAVENOUS ONCE
Status: DISCONTINUED | OUTPATIENT
Start: 2018-02-21 | End: 2018-02-21 | Stop reason: HOSPADM

## 2018-02-21 RX ORDER — VANCOMYCIN/0.9 % SOD CHLORIDE 1.5G/250ML
1500 PLASTIC BAG, INJECTION (ML) INTRAVENOUS EVERY 8 HOURS
Status: CANCELLED | OUTPATIENT
Start: 2018-02-21

## 2018-02-21 RX ORDER — HYDROMORPHONE HYDROCHLORIDE 2 MG/ML
0.5 INJECTION, SOLUTION INTRAMUSCULAR; INTRAVENOUS; SUBCUTANEOUS
Status: DISCONTINUED | OUTPATIENT
Start: 2018-02-21 | End: 2018-02-24

## 2018-02-21 RX ORDER — NALOXONE HYDROCHLORIDE 0.4 MG/ML
0.4 INJECTION, SOLUTION INTRAMUSCULAR; INTRAVENOUS; SUBCUTANEOUS
Status: DISCONTINUED | OUTPATIENT
Start: 2018-02-21 | End: 2018-03-07 | Stop reason: HOSPADM

## 2018-02-21 RX ORDER — OXYCODONE AND ACETAMINOPHEN 5; 325 MG/1; MG/1
2 TABLET ORAL
Status: CANCELLED | OUTPATIENT
Start: 2018-02-21

## 2018-02-21 RX ORDER — SODIUM CHLORIDE 9 MG/ML
250 INJECTION, SOLUTION INTRAVENOUS AS NEEDED
Status: CANCELLED | OUTPATIENT
Start: 2018-02-21

## 2018-02-21 RX ORDER — MIDAZOLAM HYDROCHLORIDE 1 MG/ML
2 INJECTION, SOLUTION INTRAMUSCULAR; INTRAVENOUS ONCE
Status: COMPLETED | OUTPATIENT
Start: 2018-02-21 | End: 2018-02-21

## 2018-02-21 RX ORDER — SODIUM CHLORIDE, SODIUM LACTATE, POTASSIUM CHLORIDE, CALCIUM CHLORIDE 600; 310; 30; 20 MG/100ML; MG/100ML; MG/100ML; MG/100ML
75 INJECTION, SOLUTION INTRAVENOUS
Status: DISPENSED | OUTPATIENT
Start: 2018-02-21 | End: 2018-02-21

## 2018-02-21 RX ORDER — PROPOFOL 10 MG/ML
INJECTION, EMULSION INTRAVENOUS AS NEEDED
Status: DISCONTINUED | OUTPATIENT
Start: 2018-02-21 | End: 2018-02-21 | Stop reason: HOSPADM

## 2018-02-21 RX ORDER — HYDRALAZINE HYDROCHLORIDE 20 MG/ML
20 INJECTION INTRAMUSCULAR; INTRAVENOUS
Status: CANCELLED | OUTPATIENT
Start: 2018-02-21

## 2018-02-21 RX ORDER — TRAMADOL HYDROCHLORIDE 50 MG/1
50 TABLET ORAL
Status: CANCELLED | OUTPATIENT
Start: 2018-02-21

## 2018-02-21 RX ORDER — ACETAMINOPHEN 325 MG/1
650 TABLET ORAL
Status: DISCONTINUED | OUTPATIENT
Start: 2018-02-21 | End: 2018-03-07 | Stop reason: HOSPADM

## 2018-02-21 RX ORDER — HYDROMORPHONE HYDROCHLORIDE 2 MG/ML
1 INJECTION, SOLUTION INTRAMUSCULAR; INTRAVENOUS; SUBCUTANEOUS
Status: DISCONTINUED | OUTPATIENT
Start: 2018-02-21 | End: 2018-02-24

## 2018-02-21 RX ORDER — OXYCODONE HYDROCHLORIDE 5 MG/1
5 TABLET ORAL
Status: DISCONTINUED | OUTPATIENT
Start: 2018-02-21 | End: 2018-02-21 | Stop reason: HOSPADM

## 2018-02-21 RX ORDER — VANCOMYCIN/0.9 % SOD CHLORIDE 1.5G/250ML
1500 PLASTIC BAG, INJECTION (ML) INTRAVENOUS EVERY 8 HOURS
Status: DISCONTINUED | OUTPATIENT
Start: 2018-02-21 | End: 2018-02-21

## 2018-02-21 RX ORDER — OXYCODONE AND ACETAMINOPHEN 5; 325 MG/1; MG/1
1 TABLET ORAL
Status: CANCELLED | OUTPATIENT
Start: 2018-02-21

## 2018-02-21 RX ORDER — SODIUM CHLORIDE 9 MG/ML
250 INJECTION, SOLUTION INTRAVENOUS AS NEEDED
Status: DISCONTINUED | OUTPATIENT
Start: 2018-02-21 | End: 2018-02-22

## 2018-02-21 RX ORDER — NALOXONE HYDROCHLORIDE 0.4 MG/ML
0.4 INJECTION, SOLUTION INTRAMUSCULAR; INTRAVENOUS; SUBCUTANEOUS
Status: CANCELLED | OUTPATIENT
Start: 2018-02-21

## 2018-02-21 RX ORDER — ONDANSETRON 2 MG/ML
INJECTION INTRAMUSCULAR; INTRAVENOUS AS NEEDED
Status: DISCONTINUED | OUTPATIENT
Start: 2018-02-21 | End: 2018-02-21 | Stop reason: HOSPADM

## 2018-02-21 RX ORDER — TRAMADOL HYDROCHLORIDE 50 MG/1
50 TABLET ORAL
Status: DISCONTINUED | OUTPATIENT
Start: 2018-02-21 | End: 2018-03-07 | Stop reason: HOSPADM

## 2018-02-21 RX ORDER — HYDROMORPHONE HYDROCHLORIDE 2 MG/ML
0.5 INJECTION, SOLUTION INTRAMUSCULAR; INTRAVENOUS; SUBCUTANEOUS
Status: CANCELLED | OUTPATIENT
Start: 2018-02-21

## 2018-02-21 RX ORDER — HYDROMORPHONE HYDROCHLORIDE 2 MG/ML
1 INJECTION, SOLUTION INTRAMUSCULAR; INTRAVENOUS; SUBCUTANEOUS
Status: CANCELLED | OUTPATIENT
Start: 2018-02-21

## 2018-02-21 RX ORDER — MIDAZOLAM HYDROCHLORIDE 1 MG/ML
2 INJECTION, SOLUTION INTRAMUSCULAR; INTRAVENOUS ONCE
Status: DISCONTINUED | OUTPATIENT
Start: 2018-02-21 | End: 2018-02-21 | Stop reason: HOSPADM

## 2018-02-21 RX ADMIN — HYDRALAZINE HYDROCHLORIDE 20 MG: 20 INJECTION INTRAMUSCULAR; INTRAVENOUS at 17:34

## 2018-02-21 RX ADMIN — ONDANSETRON 4 MG: 2 INJECTION INTRAMUSCULAR; INTRAVENOUS at 14:30

## 2018-02-21 RX ADMIN — VANCOMYCIN HYDROCHLORIDE 1500 MG: 10 INJECTION, POWDER, LYOPHILIZED, FOR SOLUTION INTRAVENOUS at 05:12

## 2018-02-21 RX ADMIN — ROCURONIUM BROMIDE 5 MG: 10 INJECTION, SOLUTION INTRAVENOUS at 13:42

## 2018-02-21 RX ADMIN — FENTANYL CITRATE 50 MCG: 50 INJECTION, SOLUTION INTRAMUSCULAR; INTRAVENOUS at 14:00

## 2018-02-21 RX ADMIN — TRAMADOL HYDROCHLORIDE 50 MG: 50 TABLET, FILM COATED ORAL at 23:38

## 2018-02-21 RX ADMIN — SODIUM CHLORIDE, SODIUM LACTATE, POTASSIUM CHLORIDE, AND CALCIUM CHLORIDE 75 ML/HR: 600; 310; 30; 20 INJECTION, SOLUTION INTRAVENOUS at 09:42

## 2018-02-21 RX ADMIN — FENTANYL CITRATE 50 MCG: 50 INJECTION, SOLUTION INTRAMUSCULAR; INTRAVENOUS at 14:26

## 2018-02-21 RX ADMIN — VANCOMYCIN HYDROCHLORIDE 1500 MG: 10 INJECTION, POWDER, LYOPHILIZED, FOR SOLUTION INTRAVENOUS at 15:59

## 2018-02-21 RX ADMIN — HYDROMORPHONE HYDROCHLORIDE 0.5 MG: 2 INJECTION, SOLUTION INTRAMUSCULAR; INTRAVENOUS; SUBCUTANEOUS at 14:50

## 2018-02-21 RX ADMIN — HYDROMORPHONE HYDROCHLORIDE 0.5 MG: 2 INJECTION, SOLUTION INTRAMUSCULAR; INTRAVENOUS; SUBCUTANEOUS at 15:17

## 2018-02-21 RX ADMIN — FAMOTIDINE 20 MG: 20 TABLET, FILM COATED ORAL at 09:44

## 2018-02-21 RX ADMIN — HYDROMORPHONE HYDROCHLORIDE 0.5 MG: 2 INJECTION, SOLUTION INTRAMUSCULAR; INTRAVENOUS; SUBCUTANEOUS at 15:00

## 2018-02-21 RX ADMIN — OXYCODONE HYDROCHLORIDE AND ACETAMINOPHEN 2 TABLET: 5; 325 TABLET ORAL at 17:57

## 2018-02-21 RX ADMIN — PROPOFOL 150 MG: 10 INJECTION, EMULSION INTRAVENOUS at 13:42

## 2018-02-21 RX ADMIN — LIDOCAINE HYDROCHLORIDE 100 MG: 20 INJECTION, SOLUTION EPIDURAL; INFILTRATION; INTRACAUDAL; PERINEURAL at 13:42

## 2018-02-21 RX ADMIN — MIDAZOLAM HYDROCHLORIDE 2 MG: 1 INJECTION, SOLUTION INTRAMUSCULAR; INTRAVENOUS at 12:59

## 2018-02-21 RX ADMIN — HYDROMORPHONE HYDROCHLORIDE 0.5 MG: 2 INJECTION, SOLUTION INTRAMUSCULAR; INTRAVENOUS; SUBCUTANEOUS at 15:26

## 2018-02-21 RX ADMIN — OXYCODONE HYDROCHLORIDE AND ACETAMINOPHEN 2 TABLET: 5; 325 TABLET ORAL at 21:53

## 2018-02-21 RX ADMIN — FENTANYL CITRATE 50 MCG: 50 INJECTION, SOLUTION INTRAMUSCULAR; INTRAVENOUS at 14:30

## 2018-02-21 RX ADMIN — SODIUM CHLORIDE, SODIUM LACTATE, POTASSIUM CHLORIDE, AND CALCIUM CHLORIDE 75 ML/HR: 600; 310; 30; 20 INJECTION, SOLUTION INTRAVENOUS at 15:44

## 2018-02-21 RX ADMIN — OXYCODONE HYDROCHLORIDE 10 MG: 5 TABLET ORAL at 15:41

## 2018-02-21 RX ADMIN — HYDROMORPHONE HYDROCHLORIDE 0.5 MG: 2 INJECTION, SOLUTION INTRAMUSCULAR; INTRAVENOUS; SUBCUTANEOUS at 14:55

## 2018-02-21 RX ADMIN — SUCCINYLCHOLINE CHLORIDE 180 MG: 20 INJECTION INTRAMUSCULAR; INTRAVENOUS at 13:42

## 2018-02-21 RX ADMIN — HYDROMORPHONE HYDROCHLORIDE 0.5 MG: 2 INJECTION, SOLUTION INTRAMUSCULAR; INTRAVENOUS; SUBCUTANEOUS at 15:06

## 2018-02-21 RX ADMIN — VANCOMYCIN HYDROCHLORIDE 1500 MG: 10 INJECTION, POWDER, LYOPHILIZED, FOR SOLUTION INTRAVENOUS at 23:38

## 2018-02-21 RX ADMIN — FENTANYL CITRATE 100 MCG: 50 INJECTION, SOLUTION INTRAMUSCULAR; INTRAVENOUS at 13:39

## 2018-02-21 RX ADMIN — FENTANYL CITRATE 50 MCG: 50 INJECTION, SOLUTION INTRAMUSCULAR; INTRAVENOUS at 14:08

## 2018-02-21 NOTE — BRIEF OP NOTE
BRIEF OPERATIVE NOTE    Date of Procedure: 2/21/2018   Preoperative Diagnosis: Open wound of left knee, initial encounter [S81.002A]  Postoperative Diagnosis: Delayed primary closure of woun left knee    Procedure(s):  IRRIGATION AND DEBRIDEMENT LEFT KNEE  Surgeon(s) and Role:     * Mary Beth Yates MD - Primary         Assistant Staff: None      Surgical Staff:  Circ-1: Anton Whitney RN  Scrub Tech-1: Chanell Adame  Scrub Tech-2: Niki Luong  Scrub Tech-3: Aracelis Mcdonnell  Event Time In   Incision Start 1358   Incision Close 1430     Anesthesia: Regional   Estimated Blood Loss: TOURNIQUET  Specimens: * No specimens in log *   Findings: NONE   Complications: NONE  Implants: * No implants in log *

## 2018-02-21 NOTE — CONSULTS
[facility] Cardiology Consult    Attending Cardiologist:Dr. Devyn Juan     Primary Cardiologist:none     Primary Care Physician:none                     Referring--Dr. Ghassan Martinez, evaluate for endocarditis    Subjective:     Mahnaz Hernandez is a 57 y.o. male with history of septic knee with  Incision and drainage of left lower extremity periarticular and supraarticular abscesses. He has persistent bacturemia (MRSA). Surface echo noted no evidence of endocarditis. Dr. Dimas Mosher asking us to evaluate pt for endocarditis and evaluate MANJINDER. Pt current /124. Not on home meds for HTN. PT denies prior cardiac problems. No difficulty with swallowing, denies esophageal varacies. No problems with anesthesia in past.        History reviewed. No pertinent past medical history.    Past Surgical History:   Procedure Laterality Date    HX ORTHOPAEDIC      back      Current Facility-Administered Medications   Medication Dose Route Frequency    lactated Ringers infusion  75 mL/hr IntraVENous ON CALL TO OR    vancomycin (VANCOCIN) 1500 mg in  ml infusion  1,500 mg IntraVENous Q8H    0.9% sodium chloride infusion 250 mL  250 mL IntraVENous PRN    acetaminophen (TYLENOL) tablet 650 mg  650 mg Oral Q6H PRN    heparin (porcine) injection 5,000 Units  5,000 Units SubCUTAneous Q8H    hydrALAZINE (APRESOLINE) 20 mg/mL injection 20 mg  20 mg IntraVENous Q6H PRN    HYDROmorphone (PF) (DILAUDID) injection 0.5 mg  0.5 mg IntraVENous Q6H PRN    HYDROmorphone (PF) (DILAUDID) injection 1 mg  1 mg IntraVENous WOUND PRN    lip protectant (BLISTEX) ointment   Topical PRN    naloxone (NARCAN) injection 0.4 mg  0.4 mg IntraVENous EVERY 2 MINUTES AS NEEDED    oxyCODONE-acetaminophen (PERCOCET) 5-325 mg per tablet 1 Tab  1 Tab Oral Q4H PRN    oxyCODONE-acetaminophen (PERCOCET) 5-325 mg per tablet 2 Tab  2 Tab Oral Q4H PRN    traMADol (ULTRAM) tablet 50 mg  50 mg Oral Q6H PRN     No Known Allergies   Social History   Substance Use Topics    Smoking status: Never Smoker    Smokeless tobacco: Never Used    Alcohol use No      History reviewed. No pertinent family history. Review of Systems  Gen:+ malaise, fatigue   HEENT: Denies frequent headaches, dizzyness, visual disturbances, Neck pain or swallowing difficulty  Lungs: Denies shortness of breath, hx of COPD, breathing problems  Cardiovascular: Denies chest pain, orthopnea, PND, no syncope or near syncope  GI: Denies hememesis, dark tarry stools, No prior Hx of GI bleed, Denies constipation  : Denies dysuria, no complaints of frequency, nocturia  Heme: No prior bleeding disorders, no prior Cancer  Neuro: Denies prior CVA, TIA. Endocrine: no diabetes, thyroid disorders  Psychiatric: Denies anxiety, or other psychiatric illnesses. Objective:     Visit Vitals    BP (!) 171/96    Pulse (!) 111    Temp 98.2 °F (36.8 °C)    Resp 15    Ht 5' 8\" (1.727 m)    Wt 113.4 kg (250 lb)    SpO2 98%    BMI 38.01 kg/m2     General:Alert, cooperative, no distress, appears stated age  Head: Normocephalic, without obvious abnormality, atraumatic. Eyes: Conjunctivae/corneas clear. PERRL, EOMs intact  Nose:Nares normal. Septum midline. Mucosa normal. No drainage or sinus tenderness. Throat: Lips, mucosa, and tongue normal. Teeth and gums normal.   Neck: Supple, symmetrical, trachea midline,  no carotid bruit and no JVD. Lungs:Clear to auscultation bilaterally. Chest wall: No tenderness or deformity. Heart: Regular rate and rhythm, S1, S2 normal, no murmur, click, rub or gallop. Abdomen:Soft, non-tender. Bowel sounds normal. No masses, No organomegaly. Extremities: LL ext with dr   Pulses: 2+ and symmetric all extremities.     Skin: Skin color, texture, turgor normal. No rashes or lesions  Lymph nodes: Cervical, supraclavicular, and axillary nodes normal  Neurologic:No focal deficits identified                     Data Review:     Recent Results (from the past 24 hour(s))   PLEASE READ & DOCUMENT PPD TEST IN 48 HRS    Collection Time: 02/20/18  6:27 PM   Result Value Ref Range    PPD Negative Negative    mm Induration 0.0mm mm   HEPATIC FUNCTION PANEL    Collection Time: 02/21/18  6:00 AM   Result Value Ref Range    Protein, total 8.0 6.3 - 8.2 g/dL    Albumin 2.5 (L) 3.2 - 4.6 g/dL    Globulin 5.5 (H) 2.3 - 3.5 g/dL    A-G Ratio 0.5 (L) 1.2 - 3.5      Bilirubin, total 0.6 0.2 - 1.1 MG/DL    Bilirubin, direct 0.2 <0.4 MG/DL    Alk. phosphatase 147 (H) 50 - 136 U/L    AST (SGOT) 45 (H) 15 - 37 U/L    ALT (SGPT) 84 (H) 12 - 65 U/L   CBC WITH AUTOMATED DIFF    Collection Time: 02/21/18  6:00 AM   Result Value Ref Range    WBC 14.6 (H) 4.3 - 11.1 K/uL    RBC 3.18 (L) 4.23 - 5.67 M/uL    HGB 7.7 (L) 13.6 - 17.2 g/dL    HCT 25.6 (L) 41.1 - 50.3 %    MCV 80.5 79.6 - 97.8 FL    MCH 24.2 (L) 26.1 - 32.9 PG    MCHC 30.1 (L) 31.4 - 35.0 g/dL    RDW 17.9 (H) 11.9 - 14.6 %    PLATELET 966 (H) 154 - 450 K/uL    MPV 9.2 (L) 10.8 - 14.1 FL    NEUTROPHILS 75 43 - 78 %    LYMPHOCYTES 14 13 - 44 %    MONOCYTES 9 4.0 - 12.0 %    EOSINOPHILS 2 0.5 - 7.8 %    BASOPHILS 0 0.0 - 2.0 %    ABS. NEUTROPHILS 11.0 (H) 1.7 - 8.2 K/UL    ABS. LYMPHOCYTES 2.0 0.5 - 4.6 K/UL    ABS. MONOCYTES 1.3 0.1 - 1.3 K/UL    ABS. EOSINOPHILS 0.3 0.0 - 0.8 K/UL    ABS.  BASOPHILS 0.0 0.0 - 0.2 K/UL    RBC COMMENTS MODERATE  MICROCYTOSIS        RBC COMMENTS SLIGHT  HYPOCHROMIA        WBC COMMENTS Result Confirmed By Smear      PLATELET COMMENTS INCREASED      DF MANUAL     METABOLIC PANEL, BASIC    Collection Time: 02/21/18  6:00 AM   Result Value Ref Range    Sodium 141 136 - 145 mmol/L    Potassium 4.1 3.5 - 5.1 mmol/L    Chloride 106 98 - 107 mmol/L    CO2 26 21 - 32 mmol/L    Anion gap 9 7 - 16 mmol/L    Glucose 107 (H) 65 - 100 mg/dL    BUN 12 8 - 23 MG/DL    Creatinine 1.33 0.8 - 1.5 MG/DL    GFR est AA >60 >60 ml/min/1.73m2    GFR est non-AA 58 (L) >60 ml/min/1.73m2    Calcium 10.0 8.3 - 10.4 MG/DL         Assessment / Plan Principal Problem:    Septic arthritis of knee, left (HCC) (2/21/2018)--now with persistent bacturemia, will plan for MANJINDER in am. NPO after midnight. Hypertensive presently and in pain. PRN hydralazine ordered previously.      Active Problems:    Leukocytosis (2/13/2018)      Hypoalbuminemia (2/13/2018)      Anemia (2/13/2018)      Left leg cellulitis (2/13/2018)      Abnormal transaminases (2/13/2018)      MRSA bacteremia (2/21/2018)--as above, MANJINDER 2/22              Geovany Rodriguez NP

## 2018-02-21 NOTE — H&P
HOSPITALIST H&P/CONSULT  NAME:  Christy Wang   Age:  57 y.o.  :   1955   MRN:   [id number]  PCP: None  Consulting MD:  Treatment Team: Attending Provider: Damian Baig MD  HPI:   Meenu Bowen a 57 y. o. male who has no significant PMH who was admitted on 18 for left leg non purulent cellulitis. Of note, he lives in [place] and came to visit family members to [place], [place] on 18. He denied falls, prior knee or leg surgery. Duplex was was unremarkable for DVT. He was started on vancomycin. Has had persistent Blood cultures positive for MRSA bacteremia. Transthoracic ECHO negative for vegetations. Due to poor improvement of his thigh and leg edema and pain he underwent an MRI of his leg showing multiloculated abscesses and findings of septic knee. He underwent I&D and knee lavage on 18 by general surgery. Orthopedics performed knee aspiration. So far, all Blood cultures and wound aspirate cultures are positive for staph aureus. Patient still has persistent leukocytosis. He is being transferred to [facility] for repeat joint I&D by Orthopedic group. Infectious disease is on board. Long term plan for outpatient antibiotic completion at SNF/Rehab and wound care once Blood cultures are negative and he gets a PICC line. A comprehensive 10-point review of systems was negative except for that written in the History of Present Illness. History reviewed. No pertinent past medical history. Past Surgical History:   Procedure Laterality Date    HX ORTHOPAEDIC      back      Cannot display prior to admission medications because the patient has not been admitted in this contact. No Known Allergies   Social History   Substance Use Topics    Smoking status: Never Smoker    Smokeless tobacco: Never Used    Alcohol use No      History reviewed. No pertinent family history. Objective:     Visit Vitals    /73 (BP 1 Location: Right arm, BP Patient Position:  At rest)    Pulse 97    Temp 97 °F (36.1 °C)    Resp 19    Ht 5' 8\" (1.727 m)    Wt 113.4 kg (250 lb)    SpO2 99%    BMI 38.01 kg/m2     Temp (24hrs), Av.6 °F (37 °C), Min:96.4 °F (35.8 °C), Max:99.5 °F (37.5 °C)       Physical Exam:  General:    Alert, cooperative, no distress, appears stated age. Head:   Normocephalic, without obvious abnormality, atraumatic. Lungs:   Clear to auscultation bilaterally. No Wheezing or Rhonchi. No rales. Heart:   Regular rate and rhythm,  no murmur, rub or gallop. Abdomen:   Soft, non-tender. Not distended. Bowel sounds normal.   Extremities: LLE: wound vac in place. 1+ edema. No cyanosis. Neurologic: Alert and oriented x 3, no focal deficits     Data Review:   Recent Results (from the past 24 hour(s))   PLEASE READ & DOCUMENT PPD TEST IN 48 HRS    Collection Time: 18  6:27 PM   Result Value Ref Range    PPD Negative Negative    mm Induration 0.0mm mm   HEPATIC FUNCTION PANEL    Collection Time: 18  6:00 AM   Result Value Ref Range    Protein, total 8.0 6.3 - 8.2 g/dL    Albumin 2.5 (L) 3.2 - 4.6 g/dL    Globulin 5.5 (H) 2.3 - 3.5 g/dL    A-G Ratio 0.5 (L) 1.2 - 3.5      Bilirubin, total 0.6 0.2 - 1.1 MG/DL    Bilirubin, direct 0.2 <0.4 MG/DL    Alk. phosphatase 147 (H) 50 - 136 U/L    AST (SGOT) 45 (H) 15 - 37 U/L    ALT (SGPT) 84 (H) 12 - 65 U/L   CBC WITH AUTOMATED DIFF    Collection Time: 18  6:00 AM   Result Value Ref Range    WBC 14.6 (H) 4.3 - 11.1 K/uL    RBC 3.18 (L) 4.23 - 5.67 M/uL    HGB 7.7 (L) 13.6 - 17.2 g/dL    HCT 25.6 (L) 41.1 - 50.3 %    MCV 80.5 79.6 - 97.8 FL    MCH 24.2 (L) 26.1 - 32.9 PG    MCHC 30.1 (L) 31.4 - 35.0 g/dL    RDW 17.9 (H) 11.9 - 14.6 %    PLATELET 361 (H) 095 - 450 K/uL    MPV 9.2 (L) 10.8 - 14.1 FL    NEUTROPHILS 75 43 - 78 %    LYMPHOCYTES 14 13 - 44 %    MONOCYTES 9 4.0 - 12.0 %    EOSINOPHILS 2 0.5 - 7.8 %    BASOPHILS 0 0.0 - 2.0 %    ABS. NEUTROPHILS 11.0 (H) 1.7 - 8.2 K/UL    ABS.  LYMPHOCYTES 2.0 0.5 - 4.6 K/UL    ABS. MONOCYTES 1.3 0.1 - 1.3 K/UL    ABS. EOSINOPHILS 0.3 0.0 - 0.8 K/UL    ABS. BASOPHILS 0.0 0.0 - 0.2 K/UL    RBC COMMENTS MODERATE  MICROCYTOSIS        RBC COMMENTS SLIGHT  HYPOCHROMIA        WBC COMMENTS Result Confirmed By Smear      PLATELET COMMENTS INCREASED      DF MANUAL     METABOLIC PANEL, BASIC    Collection Time: 02/21/18  6:00 AM   Result Value Ref Range    Sodium 141 136 - 145 mmol/L    Potassium 4.1 3.5 - 5.1 mmol/L    Chloride 106 98 - 107 mmol/L    CO2 26 21 - 32 mmol/L    Anion gap 9 7 - 16 mmol/L    Glucose 107 (H) 65 - 100 mg/dL    BUN 12 8 - 23 MG/DL    Creatinine 1.33 0.8 - 1.5 MG/DL    GFR est AA >60 >60 ml/min/1.73m2    GFR est non-AA 58 (L) >60 ml/min/1.73m2    Calcium 10.0 8.3 - 10.4 MG/DL     Imaging /Procedures /Studies     Assessment and Plan:     Patient Active Problem List   Diagnosis Code    Leukocytosis D72.829    Hypoalbuminemia E88.09    Anemia D64.9    Left leg cellulitis L03. 116    Abnormal transaminases R74.8    Thrombocytosis (HCC) D47.3    Cellulitis L03.90    Septic arthritis of knee, left (HCC) M00.9    MRSA bacteremia R78.81       PLAN  - Transfer to [address] [place] [address] for orthopedic-Dr. Tyree Jarrett repeat I&D and continued management. - Continue Vancomycin and follow up ID recs. Repeat Blood culture(2/19) no growth to date. Will need PICC   - Cardio consulted. MANJINDER pending to rule out vegetation   - Hgb trended down to 7.7 this am. May need post-op transfusion.  Continue to monitor and transfuse as indicated  - Thrombocytosis: likely inflammatory/infection reaction, monitor daily   -  Hypoalbuminemia: s/p albumin iv total of 4 doses and  lasix   - Transaminitis : liver US normal and hepatitis panel negative- likely due to bacteremia- resolving      Code Status: Full  DVT ppx: Heparin    Anticipated discharge: 3-4 days    Signed By: Brenton Kurtz MD     February 21, 2018

## 2018-02-21 NOTE — PROGRESS NOTES
Bedside and Verbal shift change report given to [address] [place] [address] Avenue  (oncoming nurse) by Darin Alvarez  (offgoing nurse). Report included the following information SBAR, Kardex, Intake/Output, MAR and Recent Results.

## 2018-02-21 NOTE — ANESTHESIA POSTPROCEDURE EVALUATION
Post-Anesthesia Evaluation and Assessment    Patient: Emerita Musa MRN: [id number]  SSN: [id number]    YOB: 1955  Age: 57 y.o. Sex: male       Cardiovascular Function/Vital Signs  Visit Vitals    BP (!) 180/94    Pulse (!) 110    Temp 36.8 °C (98.2 °F)    Resp 12    Ht 5' 8\" (1.727 m)    Wt 113.4 kg (250 lb)    SpO2 98%    BMI 38.01 kg/m2       Patient is status post general anesthesia for Procedure(s):  IRRIGATION AND DEBRIDEMENT LEFT KNEE/ BEING ADMITTED FROM [place] [address] ON 2.20. Nausea/Vomiting: None    Postoperative hydration reviewed and adequate. Pain:  Pain Scale 1: Numeric (0 - 10) (02/21/18 1540)  Pain Intensity 1: 8 (02/21/18 1540)   Managed    Neurological Status:   Neuro  Neurologic State: Eyes open to voice;Drowsy (02/21/18 1441)  Orientation Level: Oriented to person;Oriented to situation (02/21/18 1441)  Cognition: Follows commands (02/21/18 1441)  Speech: Clear (02/21/18 1441)  LUE Motor Response: Purposeful (02/21/18 1441)  LLE Motor Response: Purposeful (wiggles toes) (02/21/18 1441)  RUE Motor Response: Purposeful (02/21/18 1441)  RLE Motor Response: Purposeful (02/21/18 1441)   At baseline    Mental Status and Level of Consciousness: Arousable    Pulmonary Status:   O2 Device: Nasal cannula (02/21/18 1557)   Adequate oxygenation and airway patent    Complications related to anesthesia: None    Post-anesthesia assessment completed.  No concerns    Signed By: Sepideh Oconnell MD     February 21, 2018

## 2018-02-21 NOTE — PROGRESS NOTES
Shift assessment complete. A+OX4. Daughter at bedside. RR even and unlabored. HR regular. LS clear. Ab soft with active bowels sounds. LUE swollen tender wound vac in place and functional. All needs meta t this time. Will monitor with hourly rounds while on duty.

## 2018-02-21 NOTE — PROGRESS NOTES
Surgery  Patient to be transferred to [facility] this morning. Hospitalists and Dr. Army Gonzalez will care for him there. I will sign off for now.    Eveline Mendenhall MD.

## 2018-02-21 NOTE — PROGRESS NOTES
Hospitalist Progress Note    2018  Admit Date: 2018  2:06 PM   NAME: Ashu Roper   :  1955   MRN:  [id number]   Attending: Linda Michel MD  PCP:  None    SUBJECTIVE:   Jennifer Fonseca a 57 y. o. male who has no significant PMH who was admitted for left leg non purulent cellulitis. Of note, he lives in [place] and came to visit family members to [place], [place] on 18. He denied falls, prior knee or leg surgery. Duplex was was unremarkable for DVT. He was started on vancomycin. Has persistent Blood cultures positive for MRSA bacteremia. ECHO negative for vegetations. Due to poor improvement of his thigh and leg edema and pain he underwent an MRI of his leg showing multiloculated abscesses and findings of septic knee. He underwent I&D and knee lavage on 18 by general surgery. Orthopedics performed knee aspiration. So far, all BC and wound aspirate and cultures are positive for staph aureus. ID on board. Plan for outpatient antibiotic completion at SNF/Rehab and wound care once Blood cultures are negative and he gets a PICC line. Patient still has persistent leukocytosis. He is being transferred to [facility] for further surgical intervention by Orthopedic group. Patient reports LLE pain this am. He denies fever or chills.     Review of Systems negative with exception of pertinent positives noted above  PHYSICAL EXAM     Visit Vitals    /82 (BP 1 Location: Right arm, BP Patient Position: At rest;Supine)    Pulse 99    Temp 99.1 °F (37.3 °C)    Resp 14    Ht 5' 8\" (1.727 m)    Wt 113.4 kg (250 lb)    SpO2 97%    BMI 38.01 kg/m2      Temp (24hrs), Av.9 °F (37.2 °C), Min:98.5 °F (36.9 °C), Max:99.5 °F (37.5 °C)    Oxygen Therapy  O2 Sat (%): 97 % (18 1436)  Pulse via Oximetry: 97 beats per minute (18)  O2 Device: Room air (18 1545)  O2 Flow Rate (L/min): 0 l/min (18)    Intake/Output Summary (Last 24 hours) at 18 [facility] filed at 02/19/18 2055   Gross per 24 hour   Intake                0 ml   Output              825 ml   Net             -825 ml      General appearance: alert, cooperative, no distress, appears stated age  Lungs: clear to auscultation bilaterally  Heart: regular rate and rhythm, S1, S2 normal, no murmur, click, rub or gallop  Abdomen: soft, non-tender. Bowel sounds normal. No masses,  no organomegaly  Extremities: LLE: covered with wound VAC. Pulses present, no cyanosis   Neurologic: Grossly normal                  ASSESSMENT      Active Hospital Problems    Diagnosis Date Noted    Cellulitis 02/15/2018    Leukocytosis 02/13/2018    Hypoalbuminemia 02/13/2018    Anemia 02/13/2018    Left leg cellulitis 02/13/2018    Abnormal transaminases 02/13/2018    Thrombocytosis ([address] 02/13/2018     Plan:  1. Sepsis due to Left leg with multiloculated abscess /  septic arthritis/ MRSA bacteremia: S/P I&D periarticular and supraarticular abscesses- continue vancomycin- f/u blood and wound cultures- ID F/U- ortho and general surgery consult- cardiology consult for MANJINDER to r/o infective endocarditis. Transfer to [facility] for planned orthopedic procedure in am. Bed availability limited currently.      2. Thrombocytosis: possible to stress / infection- monitor daily      3. Hypoalbuminemia: s/p albumin iv total of 4 doses and  lasix      4. Transaminitis and elevated alk phosphatase: liver US was normal- hepatitis panel negative- likely due to bacteremia- monitor      5. Anemia:  SEGUNDO / chronic anemia: low iron stores- may need iron transfusion once his infection is clear as outpatient - F/U HH if < 7gr transfuse PRN      6.Obesity: counseling       -DVT ppx: heparin, GCS over right leg    Signed By: Tj Rebollar MD     February 20, 2018

## 2018-02-21 NOTE — PERIOP NOTES
TRANSFER - OUT REPORT:    Verbal report given to LIZ Hernandez on Emerita Musa  being transferred to [facility] for routine post - op       Report consisted of patients Situation, Background, Assessment and   Recommendations(SBAR). Information from the following report(s) Procedure Summary, Intake/Output, MAR, Recent Results and Cardiac Rhythm ST was reviewed with the receiving nurse. Lines:   Peripheral IV 02/21/18 Left Hand (Active)   Site Assessment Clean, dry, & intact 2/21/2018  3:57 PM   Phlebitis Assessment 0 2/21/2018  3:57 PM   Infiltration Assessment 0 2/21/2018  3:57 PM   Dressing Status Clean, dry, & intact 2/21/2018  3:57 PM   Dressing Type Tape;Transparent 2/21/2018  3:57 PM   Hub Color/Line Status Pink; Infusing 2/21/2018  3:57 PM        Opportunity for questions and clarification was provided. Patient transported with:   O2 @ 3 liters  Tech    VTE prophylaxis orders have been written for Emerita Musa. Patient and family given floor number and nurses name. Family updated re: pt status after security code verified.

## 2018-02-21 NOTE — PROGRESS NOTES
Pharmacokinetic Consult to Pharmacist    Christy Wang is a 57 y.o. male being treated for MRSA bacteremia and bone and joint infection. Height: 5' 8\" (172.7 cm)  Weight: 113.4 kg (250 lb)  Lab Results   Component Value Date/Time    BUN 12 02/21/2018 06:00 AM    Creatinine 1.33 02/21/2018 06:00 AM    WBC 14.6 (H) 02/21/2018 06:00 AM      Estimated Creatinine Clearance: 68.6 mL/min (based on Cr of 1.33). CULTURES:  2/13 blood: MRSA  2/16 blood: MRSA  2/19 blood: NGTD    Lab Results   Component Value Date/Time    Vancomycin,trough 17.5 02/18/2018 12:13 PM       Day 9 of vancomycin--continuation from [place]. Goal trough is 15-20. I will continue previous regimen as trough was therapeutic. I will plan a repeat level tomorrow for routine monitoring. Pharmacist will continue to follow patient. Please call with any questions. Thank you,  Jobe Soulier.  Christina Gutierrez, PharmD  Clinical Pharmacist  [phone]

## 2018-02-21 NOTE — CONSULTS
[facility]    Faiza Oden  MR#: [id number]  : 1955  ACCOUNT #: [de-identified]   DATE OF SERVICE: 2018    CHIEF COMPLAINT:  Left thigh infection. HISTORY OF PRESENT ILLNESS:  I am dictating this consultation as an update since my last recorded entry on , (Friday). Some information recorded by various providers is incorrect and I would like to update some clinical information relevant to his case. I saw Mr Bhakti Chandra in consultation Thursday evening, 02/15. I aspirated his left knee. His right knee did not demonstrate swelling or effusion of the knee. I obtained only 5 mL of fluid that looked like the fluid I commonly see in a knee that has gouty arthritis. I personally labeled those specimens. Orders were entered into the computer for those fluid specimens to be studied for:   Microscopic analysis for negatively birefringent crystals (gout). Glucose. Cell count and differential.  Aerobic culture. Gram stain. Anaerobic culture. I evaluated Mr Bhakti Chandra at the bedside on 2018 at about 1:30 pm.  My clinical impression then was that he did not have a septic left knee. His right knee did not have an effusion, swelling or warmth. I found no recorded entry in [address] [place] [address] for the lab studies mentioned above. I enlisted the assistance of Florencia, the charge nurse on the floor where he was hospitalized, and she was able to confirm with the lab that they did have the specimens. The lab told her over the phone that they would carry out the studies mentioned above. I cannot find any entry in the patient's chart  that indicates that those studies have been done. At the time of my departure from [facility] on , an MRI scan of the thigh had been ordered by the infectious disease consultants. I have reviewed the recorded events in the chart since I left [facility] Friday. I have personally reviewed the MRI images of the left thigh.   I have examined the patient today in the postoperative recovery room. My opinion is that the pattern of fluid loculation in his thigh unusual and not a pattern usually associated with an isolated septic knee. My impression of the images is that the knee at the time of the scan does not have a capsular distention with fluid, but there is clearly what appears to me to be a fluid collection immediately adjacent to the suprapatellar pouch of his left knee and this fluid collection extends several inches proximally into the thigh muscles. Although I have rarely seen these findings, I have found it to occur when a patient with diabetes experiences myonecrosis as a consequence of the diabetes. I agree with the treatment of incision and drainage and subsequent second look surgery that has been carried out. I believe it likely that his thigh and knee will probably not require additional surgery if his body's immune system combined with antibiotics are able to eradicate the systemic infection. My understanding is the treatment plan now is intravenous antibiotics and observation of the thigh with modifications treatment plan as needed If the appearance of the thigh changes. I note evidence of severe malnutrition based on his laboratory studies and history by Mr Viviana Cheema. I think it would benefit him to have intensive nutritional support, perhaps even hyperalimentation. I am unable to find a calorie count or nutritional consultation in his electronic record, but that may be simply due to my inability to navigate his electronic chart to find those entries      WMD Coby Balderrama / Nikki.Melina  D: 02/21/2018 16:30     T: 02/21/2018 17:08  JOB #: [id number]

## 2018-02-21 NOTE — PROGRESS NOTES
I have examined him in recovery room and spoken with him. I have been away since Feb 16 and have dictated a note today with my clinical impression.

## 2018-02-21 NOTE — PERIOP NOTES
TRANSFER - IN REPORT:    Verbal report received from Alberto Mercado RN [facility]) on Sagar Hutson  being received from 3rd floor, [place] for ordered procedure      Report consisted of patients Situation, Background, Assessment and   Recommendations(SBAR). Information from the following report(s) SBAR, Kardex and MAR was reviewed with the receiving nurse. Opportunity for questions and clarification was provided. Assessment completed upon patients arrival to unit and care assumed.

## 2018-02-21 NOTE — ANESTHESIA PREPROCEDURE EVALUATION
Anesthetic History               Review of Systems / Medical History  Patient summary reviewed and pertinent labs reviewed    Pulmonary                   Neuro/Psych              Cardiovascular                  Exercise tolerance: >4 METS     GI/Hepatic/Renal                Endo/Other        Obesity and arthritis     Other Findings   Comments: Leg infection left         Physical Exam    Airway  Mallampati: I  TM Distance: > 6 cm  Neck ROM: normal range of motion   Mouth opening: Normal     Cardiovascular  Regular rate and rhythm,  S1 and S2 normal,  no murmur, click, rub, or gallop  Rhythm: regular  Rate: normal         Dental  No notable dental hx       Pulmonary  Breath sounds clear to auscultation               Abdominal         Other Findings            Anesthetic Plan    ASA: 2  Anesthesia type: general            Anesthetic plan and risks discussed with: Patient

## 2018-02-21 NOTE — ROUTINE PROCESS
TRANSFER - OUT REPORT:    Verbal report given to *Sourav(name) on Fenton Angelucci  being transferred to Pre-op(unit) for ordered procedure       Report consisted of patients Situation, Background, Assessment and   Recommendations(SBAR). Information from the following report(s) SBAR, Kardex, Intake/Output and MAR was reviewed with the receiving nurse. Lines:   Peripheral IV 02/19/18 Right Hand (Active)   Site Assessment Clean, dry, & intact 2/21/2018  5:03 AM   Phlebitis Assessment 0 2/21/2018  5:03 AM   Infiltration Assessment 0 2/21/2018  5:03 AM   Dressing Status Clean, dry, & intact 2/21/2018  5:03 AM   Dressing Type Transparent;Tape 2/21/2018  5:03 AM   Hub Color/Line Status Capped 2/20/2018  7:18 PM        Opportunity for questions and clarification was provided.       Patient to be transported with:   Monitor

## 2018-02-22 LAB
ALBUMIN SERPL-MCNC: 2.6 G/DL (ref 3.2–4.6)
ALBUMIN/GLOB SERPL: 0.5 {RATIO} (ref 1.2–3.5)
ALP SERPL-CCNC: 139 U/L (ref 50–136)
ALT SERPL-CCNC: 68 U/L (ref 12–65)
ANION GAP SERPL CALC-SCNC: 8 MMOL/L (ref 7–16)
AST SERPL-CCNC: 36 U/L (ref 15–37)
BASOPHILS # BLD: 0 K/UL (ref 0–0.2)
BASOPHILS NFR BLD: 0 % (ref 0–2)
BILIRUB DIRECT SERPL-MCNC: 0.2 MG/DL
BILIRUB SERPL-MCNC: 0.6 MG/DL (ref 0.2–1.1)
BUN SERPL-MCNC: 13 MG/DL (ref 8–23)
CALCIUM SERPL-MCNC: 9.8 MG/DL (ref 8.3–10.4)
CHLORIDE SERPL-SCNC: 105 MMOL/L (ref 98–107)
CO2 SERPL-SCNC: 26 MMOL/L (ref 21–32)
CREAT SERPL-MCNC: 1.04 MG/DL (ref 0.8–1.5)
CRP SERPL-MCNC: 9.9 MG/DL (ref 0–0.9)
DIFFERENTIAL METHOD BLD: ABNORMAL
EOSINOPHIL # BLD: 0.3 K/UL (ref 0–0.8)
EOSINOPHIL NFR BLD: 3 % (ref 0.5–7.8)
ERYTHROCYTE [DISTWIDTH] IN BLOOD BY AUTOMATED COUNT: 18.2 % (ref 11.9–14.6)
ERYTHROCYTE [SEDIMENTATION RATE] IN BLOOD: 120 MM/HR (ref 0–20)
GLOBULIN SER CALC-MCNC: 5.4 G/DL (ref 2.3–3.5)
GLUCOSE SERPL-MCNC: 99 MG/DL (ref 65–100)
HCT VFR BLD AUTO: 25.1 % (ref 41.1–50.3)
HGB BLD-MCNC: 7.9 G/DL (ref 13.6–17.2)
IMM GRANULOCYTES # BLD: 0.1 K/UL (ref 0–0.5)
IMM GRANULOCYTES NFR BLD AUTO: 1 % (ref 0–5)
LYMPHOCYTES # BLD: 2 K/UL (ref 0.5–4.6)
LYMPHOCYTES NFR BLD: 15 % (ref 13–44)
MCH RBC QN AUTO: 25.3 PG (ref 26.1–32.9)
MCHC RBC AUTO-ENTMCNC: 31.5 G/DL (ref 31.4–35)
MCV RBC AUTO: 80.4 FL (ref 79.6–97.8)
MONOCYTES # BLD: 1 K/UL (ref 0.1–1.3)
MONOCYTES NFR BLD: 8 % (ref 4–12)
NEUTS SEG # BLD: 9.5 K/UL (ref 1.7–8.2)
NEUTS SEG NFR BLD: 73 % (ref 43–78)
PLATELET # BLD AUTO: 641 K/UL (ref 150–450)
PMV BLD AUTO: 9 FL (ref 10.8–14.1)
POTASSIUM SERPL-SCNC: 3.9 MMOL/L (ref 3.5–5.1)
PROT SERPL-MCNC: 8 G/DL (ref 6.3–8.2)
RBC # BLD AUTO: 3.12 M/UL (ref 4.23–5.67)
SODIUM SERPL-SCNC: 139 MMOL/L (ref 136–145)
VANCOMYCIN TROUGH SERPL-MCNC: 26 UG/ML (ref 5–20)
WBC # BLD AUTO: 13 K/UL (ref 4.3–11.1)

## 2018-02-22 PROCEDURE — 74011000250 HC RX REV CODE- 250: Performed by: INTERNAL MEDICINE

## 2018-02-22 PROCEDURE — 36415 COLL VENOUS BLD VENIPUNCTURE: CPT | Performed by: FAMILY MEDICINE

## 2018-02-22 PROCEDURE — 74011250637 HC RX REV CODE- 250/637: Performed by: FAMILY MEDICINE

## 2018-02-22 PROCEDURE — 85025 COMPLETE CBC W/AUTO DIFF WBC: CPT | Performed by: FAMILY MEDICINE

## 2018-02-22 PROCEDURE — 80076 HEPATIC FUNCTION PANEL: CPT | Performed by: FAMILY MEDICINE

## 2018-02-22 PROCEDURE — 65270000029 HC RM PRIVATE

## 2018-02-22 PROCEDURE — 93312 ECHO TRANSESOPHAGEAL: CPT

## 2018-02-22 PROCEDURE — 74011250636 HC RX REV CODE- 250/636

## 2018-02-22 PROCEDURE — L1830 KO IMMOB CANVAS LONG PRE OTS: HCPCS

## 2018-02-22 PROCEDURE — 99152 MOD SED SAME PHYS/QHP 5/>YRS: CPT

## 2018-02-22 PROCEDURE — 86140 C-REACTIVE PROTEIN: CPT | Performed by: NURSE PRACTITIONER

## 2018-02-22 PROCEDURE — 80202 ASSAY OF VANCOMYCIN: CPT | Performed by: INTERNAL MEDICINE

## 2018-02-22 PROCEDURE — 74011250636 HC RX REV CODE- 250/636: Performed by: ORTHOPAEDIC SURGERY

## 2018-02-22 PROCEDURE — B246ZZ4 ULTRASONOGRAPHY OF RIGHT AND LEFT HEART, TRANSESOPHAGEAL: ICD-10-PCS | Performed by: INTERNAL MEDICINE

## 2018-02-22 PROCEDURE — 85652 RBC SED RATE AUTOMATED: CPT | Performed by: NURSE PRACTITIONER

## 2018-02-22 PROCEDURE — 80048 BASIC METABOLIC PNL TOTAL CA: CPT | Performed by: FAMILY MEDICINE

## 2018-02-22 RX ORDER — MIDAZOLAM HYDROCHLORIDE 1 MG/ML
.5-2 INJECTION, SOLUTION INTRAMUSCULAR; INTRAVENOUS
Status: DISCONTINUED | OUTPATIENT
Start: 2018-02-22 | End: 2018-02-23

## 2018-02-22 RX ORDER — LIDOCAINE HYDROCHLORIDE 20 MG/ML
15 SOLUTION OROPHARYNGEAL AS NEEDED
Status: DISCONTINUED | OUTPATIENT
Start: 2018-02-22 | End: 2018-02-23

## 2018-02-22 RX ADMIN — MIDAZOLAM HYDROCHLORIDE 2 MG: 1 INJECTION, SOLUTION INTRAMUSCULAR; INTRAVENOUS at 16:10

## 2018-02-22 RX ADMIN — TRAMADOL HYDROCHLORIDE 50 MG: 50 TABLET, FILM COATED ORAL at 21:17

## 2018-02-22 RX ADMIN — OXYCODONE HYDROCHLORIDE AND ACETAMINOPHEN 2 TABLET: 5; 325 TABLET ORAL at 11:46

## 2018-02-22 RX ADMIN — LIDOCAINE HYDROCHLORIDE 15 ML: 20 SOLUTION ORAL; TOPICAL at 15:00

## 2018-02-22 RX ADMIN — TRAMADOL HYDROCHLORIDE 50 MG: 50 TABLET, FILM COATED ORAL at 08:25

## 2018-02-22 RX ADMIN — OXYCODONE HYDROCHLORIDE AND ACETAMINOPHEN 2 TABLET: 5; 325 TABLET ORAL at 06:25

## 2018-02-22 RX ADMIN — OXYCODONE HYDROCHLORIDE AND ACETAMINOPHEN 2 TABLET: 5; 325 TABLET ORAL at 01:55

## 2018-02-22 RX ADMIN — VANCOMYCIN HYDROCHLORIDE 1500 MG: 10 INJECTION, POWDER, LYOPHILIZED, FOR SOLUTION INTRAVENOUS at 08:25

## 2018-02-22 RX ADMIN — OXYCODONE HYDROCHLORIDE AND ACETAMINOPHEN 2 TABLET: 5; 325 TABLET ORAL at 18:25

## 2018-02-22 RX ADMIN — TRAMADOL HYDROCHLORIDE 50 MG: 50 TABLET, FILM COATED ORAL at 14:23

## 2018-02-22 NOTE — PROGRESS NOTES
Problem: Falls - Risk of  Goal: *Absence of Falls  Document Kinjal Fall Risk and appropriate interventions in the flowsheet.    Outcome: Progressing Towards Goal  Fall Risk Interventions:  Mobility Interventions: Bed/chair exit alarm, Communicate number of staff needed for ambulation/transfer, OT consult for ADLs, PT Consult for mobility concerns, PT Consult for assist device competence, Patient to call before getting OOB    Mentation Interventions: Adequate sleep, hydration, pain control, Bed/chair exit alarm, Door open when patient unattended, Evaluate medications/consider consulting pharmacy, Eyeglasses and hearing aids, Familiar objects from home, Family/sitter at bedside, Gait belt with transfers/ambulation, Increase mobility, More frequent rounding, Reorient patient, Room close to nurse's station, Self-releasing belt, Toileting rounds, Update white board    Medication Interventions: Bed/chair exit alarm, Evaluate medications/consider consulting pharmacy, Patient to call before getting OOB    Elimination Interventions: Bed/chair exit alarm, Call light in reach, Patient to call for help with toileting needs, Toileting schedule/hourly rounds

## 2018-02-22 NOTE — PROGRESS NOTES
Spiritual Care visit. Attempted. Patient was out of room.     Visit by Sadi Dougherty M.Ed., Th.B. ,Staff

## 2018-02-22 NOTE — PROGRESS NOTES
Infectious Disease Progress Note    Today's Date: 2018   Admit Date: 2018    Impression:   · MRSA bacteremia ( - ); repeat cultures  NGTD; TTE  negative for vegetation, MANJINDER pending. · MRSA pyomyositis left leg  · MRSA left knee septic arthritis, aspirated 18, s/p I&D   · Recent lumbar spine surgery with hardware, 10/2017 (in [place])  · Elevated LFTs - hep panel negative; ? NAFLD  · Microcytic anemia with elevated ferritin - likely Chronic disease anemia or inflammatory anemia  · Thrombocytosis    Plan:   · Continue IV vancomycin. Follow pending cultures. MANJINDER pending  · Vancomycin trough is therapeutic. He will need 4-6 weeks of treatment. · Plan is for rehab, likely early next week. Anti-infectives:   IV vancomycin    Subjective:   He is ambulating in the room with difficulty; afebrile    No Known Allergies     Review of Systems:  Pertinent items are noted in the History of Present Illness.     Objective:     Visit Vitals    BP (!) 160/97 (BP 1 Location: Right arm, BP Patient Position: At rest)    Pulse (!) 105    Temp 99.4 °F (37.4 °C)    Resp 19    Ht 5' 8\" (1.727 m)    Wt 113.4 kg (250 lb)    SpO2 98%    BMI 38.01 kg/m2     Temp (24hrs), Av.7 °F (37.1 °C), Min:97.9 °F (36.6 °C), Max:99.4 °F (37.4 °C)       Lines:  Peripheral IV:          Physical Exam:    General:  Alert, cooperative, frustrated, lying in bed   Eyes:  Sclera anicteric   Mouth/Throat: oral pharynx clear   Neck: Supple   Lungs:   Breathing comfortably, lungs clear anteriorly   CV:  Tachycardic, without audible murmur   Abdomen:   non-distended   Extremities: No cyanosis   Skin: LLE venous stasis changes noted   Musculoskeletal: LLE wrapped in surgical wrap with hemovac in place   Lines/Devices:  Intact, no erythema, drainage or tenderness   Psych: Alert, oriented     Data Review:     CBC:  Recent Labs      18   0643  18   0600  18   0642   WBC  13.0*  14.6*  16.0*   GRANS  73  75 74   MONOS  8  9  8   EOS  3  2  2   ANEU  9.5*  11.0*  12.0*   ABL  2.0  2.0  2.2   HGB  7.9*  7.7*  8.3*   HCT  25.1*  25.6*  27.6*   PLT  641*  632*  741*       BMP:  Recent Labs      18   0643  18   0600  18   0642   CREA  1.04  1.33  1.12   BUN  13  12  13   NA  139  141  141   K  3.9  4.1  3.4*   CL  105  106  105   CO2  26  26  25   AGAP  8  9  11   GLU  99  107*  102*       LFTS:  Recent Labs      18   0643  18   0600  18   0642   TBILI  0.6  0.6  0.5   ALT  68*  84*  109*   SGOT  36  45*  58*   AP  139*  147*  169*   TP  8.0  8.0  8.4*   ALB  2.6*  2.5*  2.5*       Microbiology:     All Micro Results     None          Imagin/15/18 L knee film: IMPRESSION:  1. Large joint effusion and soft tissue edema without acute osseous abnormality. An infectious/inflammatory process of the left knee joint cannot be excluded  given the appearance. Recommend clinical correlation. 18 MRI left leg  IMPRESSION:  1. Multiloculated fluid collections within the left lower extremity most compatible with abscesses. A large joint effusion is present with enhancement as well as enhancement within the joint space therefore concerning for septic arthritis. There are no definite findings of osteomyelitis. 2. Large right joint effusion.     Signed By: Anjum Mclaughlin NP     2018

## 2018-02-22 NOTE — PROGRESS NOTES
Primary Nurse Brian Seymour and Katarina Doe RN performed a dual skin assessment on this patient Impairment noted- see wound doc flow sheet  Renny score is 19.

## 2018-02-22 NOTE — PROGRESS NOTES
TRANSFER - OUT REPORT:    Verbal report given to 7th floor nurse(name) on Ethyl Earing  being transferred to [facility](unit) for routine post - op       Report consisted of patients Situation, Background, Assessment and   Recommendations(SBAR). Information from the following report(s) Kardex and Procedure Summary was reviewed with the receiving nurse. Opportunity for questions and clarification was provided.

## 2018-02-22 NOTE — PROGRESS NOTES
Hospitalist Progress Note    2018  Admit Date: 2018  9:12 AM   NAME: Milan Garcia   :  1955   DOS:              18  MRN:  [id number]   Attending: Ladd Hodgkin, MD  PCP:  None  Treatment Team: Attending Provider: Wei Fry. Debra Dozier MD; Physician: Eula Rodriguez MD; Consulting Provider: Chun Goodwin MD; Consulting Provider: Vanessa Love MD; Consulting Provider: Marry León NP; Consulting Provider: David Cheney MD; Utilization Review: Shon Chance RN    Full Code     SUBJECTIVE:   As previously documented: \" Mr. Melodie Gonzalez a 57 y. o. male who has no significant PMHx who was admitted on 18 for left leg non purulent cellulitis. Of note, he lives in [place] and came to visit family members to [place], [place] on 18. He denied falls, prior knee or leg surgery. Duplex was was unremarkable for DVT. He was started on vancomycin. Has persistent Blood cultures positive for MRSA bacteremia. ECHO negative for vegetations. Due to poor improvement of his thigh and leg edema and pain he underwent an MRI of his leg showing multiloculated abscesses and findings of septic knee. He underwent I&D and knee lavage on 18 by general surgery. Orthopedics performed knee aspiration. So far, all BC and wound aspirate and cultures are positive for staph aureus. ID consulted. Plan for outpatient antibiotic completion at SNF/Rehab and wound care once Blood cultures are negative and he gets a PICC line. Patient still has persistent leukocytosis. He is being transferred to [facility] for further surgical intervention by Orthopedic group- Dr. Manav Edgar".           18   Mr. Milan Garcia had left knee irrigation and knee debridement on . Denies any SOB, CP or abdominal pain. Afebrile. Plan for MANJINDER today. On IV Vancomycin            10+ ROS reviewed and negative except for positive in HPI.    No Known Allergies  Current Facility-Administered Medications Medication Dose Route Frequency    vancomycin (VANCOCIN) 1500 mg in  ml infusion  1,500 mg IntraVENous Q8H    acetaminophen (TYLENOL) tablet 650 mg  650 mg Oral Q6H PRN    heparin (porcine) injection 5,000 Units  5,000 Units SubCUTAneous Q8H    hydrALAZINE (APRESOLINE) 20 mg/mL injection 20 mg  20 mg IntraVENous Q6H PRN    HYDROmorphone (PF) (DILAUDID) injection 0.5 mg  0.5 mg IntraVENous Q6H PRN    HYDROmorphone (PF) (DILAUDID) injection 1 mg  1 mg IntraVENous WOUND PRN    lip protectant (BLISTEX) ointment   Topical PRN    naloxone (NARCAN) injection 0.4 mg  0.4 mg IntraVENous EVERY 2 MINUTES AS NEEDED    oxyCODONE-acetaminophen (PERCOCET) 5-325 mg per tablet 1 Tab  1 Tab Oral Q4H PRN    oxyCODONE-acetaminophen (PERCOCET) 5-325 mg per tablet 2 Tab  2 Tab Oral Q4H PRN    traMADol (ULTRAM) tablet 50 mg  50 mg Oral Q6H PRN         Immunization History   Administered Date(s) Administered    TB Skin Test (PPD) Intradermal 2018     Objective:     Patient Vitals for the past 24 hrs:   Temp Pulse Resp BP SpO2   18 1145 99 °F (37.2 °C) (!) 105 19 (!) 160/97 96 %   18 0734 99.2 °F (37.3 °C) (!) 104 19 148/83 93 %   18 0401 97.9 °F (36.6 °C) 91 20 138/78 98 %   18 2337 98.9 °F (37.2 °C) (!) 118 20 (!) 158/91 98 %   18 1945 98.9 °F (37.2 °C) (!) 118 20 (!) 158/91 98 %   18 1757 - - - 155/81 -   18 1612 - (!) 111 15 (!) 171/96 98 %   18 1557 98.2 °F (36.8 °C) (!) 110 12 (!) 180/94 98 %   18 1523 - (!) 112 19 (!) 172/128 96 %     Temp (24hrs), Av.7 °F (37.1 °C), Min:97.9 °F (36.6 °C), Max:99.2 °F (37.3 °C)    Oxygen Therapy  O2 Sat (%): 96 % (18 1145)  Pulse via Oximetry: 112 beats per minute (18 1612)  O2 Device: Room air (18 0734)  O2 Flow Rate (L/min): 3 l/min (18 1557)  Oxygen Therapy  O2 Sat (%): 96 % (18 1145)  Pulse via Oximetry: 112 beats per minute (18 1612)  O2 Device: Room air (18 0734)  O2 Flow Rate (L/min): 3 l/min (02/21/18 3297)    Physical Exam:  General:         Alert, cooperative, no distress   HEENT:               NCAT. No obvious deformity. Nares normal. No drainage  Lungs:  CTABL. No wheezing/rhonchi/rales  Cardiovascular:   RRR. No m/r/g. No pedal edema b/l. +2 PT/DT pulses b/l. Abdomen:       S/nt/nd. Bowel sounds normal. .   Skin:         No rashes or lesions. Not Jaundiced  Neurologic:    AAOx3. CN II- XII grossly WNL. No gross focal deficit. Moves all extremities. Normal sensory. Normal gait. Psychiatric:         Good mood. Normal affect. Denies any SI/HI. DIAGNOSTIC STUDIES      Data Review:   Recent Results (from the past 24 hour(s))   URIC ACID    Collection Time: 02/21/18  7:02 PM   Result Value Ref Range    Uric acid 6.2 (H) 2.6 - 6.0 MG/DL   HEPATIC FUNCTION PANEL    Collection Time: 02/22/18  6:43 AM   Result Value Ref Range    Protein, total 8.0 6.3 - 8.2 g/dL    Albumin 2.6 (L) 3.2 - 4.6 g/dL    Globulin 5.4 (H) 2.3 - 3.5 g/dL    A-G Ratio 0.5 (L) 1.2 - 3.5      Bilirubin, total 0.6 0.2 - 1.1 MG/DL    Bilirubin, direct 0.2 <0.4 MG/DL    Alk. phosphatase 139 (H) 50 - 136 U/L    AST (SGOT) 36 15 - 37 U/L    ALT (SGPT) 68 (H) 12 - 65 U/L   CBC WITH AUTOMATED DIFF    Collection Time: 02/22/18  6:43 AM   Result Value Ref Range    WBC 13.0 (H) 4.3 - 11.1 K/uL    RBC 3.12 (L) 4.23 - 5.67 M/uL    HGB 7.9 (L) 13.6 - 17.2 g/dL    HCT 25.1 (L) 41.1 - 50.3 %    MCV 80.4 79.6 - 97.8 FL    MCH 25.3 (L) 26.1 - 32.9 PG    MCHC 31.5 31.4 - 35.0 g/dL    RDW 18.2 (H) 11.9 - 14.6 %    PLATELET 737 (H) 549 - 450 K/uL    MPV 9.0 (L) 10.8 - 14.1 FL    DF AUTOMATED      NEUTROPHILS 73 43 - 78 %    LYMPHOCYTES 15 13 - 44 %    MONOCYTES 8 4.0 - 12.0 %    EOSINOPHILS 3 0.5 - 7.8 %    BASOPHILS 0 0.0 - 2.0 %    IMMATURE GRANULOCYTES 1 0.0 - 5.0 %    ABS. NEUTROPHILS 9.5 (H) 1.7 - 8.2 K/UL    ABS. LYMPHOCYTES 2.0 0.5 - 4.6 K/UL    ABS. MONOCYTES 1.0 0.1 - 1.3 K/UL    ABS.  EOSINOPHILS 0.3 0.0 - 0.8 K/UL    ABS. BASOPHILS 0.0 0.0 - 0.2 K/UL    ABS. IMM. GRANS. 0.1 0.0 - 0.5 K/UL   METABOLIC PANEL, BASIC    Collection Time: 02/22/18  6:43 AM   Result Value Ref Range    Sodium 139 136 - 145 mmol/L    Potassium 3.9 3.5 - 5.1 mmol/L    Chloride 105 98 - 107 mmol/L    CO2 26 21 - 32 mmol/L    Anion gap 8 7 - 16 mmol/L    Glucose 99 65 - 100 mg/dL    BUN 13 8 - 23 MG/DL    Creatinine 1.04 0.8 - 1.5 MG/DL    GFR est AA >60 >60 ml/min/1.73m2    GFR est non-AA >60 >60 ml/min/1.73m2    Calcium 9.8 8.3 - 10.4 MG/DL   SED RATE, AUTOMATED    Collection Time: 02/22/18  1:50 PM   Result Value Ref Range    Sed rate, automated 120 (H) 0 - 20 mm/hr   C REACTIVE PROTEIN, QT    Collection Time: 02/22/18  1:50 PM   Result Value Ref Range    C-Reactive protein 9.9 (H) 0.0 - 0.9 mg/dL       All Micro Results     None          Imaging /Procedures /Studies:    MRI LLE 2/16/18 FINDINGS:   There are multiple fluid collections within the deep muscles of the  left leg particularly along the anterior and lateral thigh. One of these  components measures approximately 3.0 x 6.5 x 4.0 cm. There are smaller  components below the knee medially extending to the upper to mid calf. There is  a large joint effusion with peripheral enhancement. There is enhancement about  the joint space. There is marrow edema associated with the medial compartment  which is relatively symmetric to the contralateral side, more likely to be  degenerative rather than osteomyelitis. There is extensive muscular edema. There  is a large joint effusion on the right as well although postcontrast imaging of  this knee was not performed. IMPRESSION:  1. Multiloculated fluid collections within the left lower extremity most  compatible with abscesses. A large joint effusion is present with enhancement as  well as enhancement within the joint space therefore concerning for septic  arthritis. There are no definite findings of osteomyelitis.   2. Large right joint effusion. Echocardiogram 2/14/18: SUMMARY:  -  Left ventricle: Systolic function was normal. Ejection fraction was  estimated in the range of 55 % to 60 %. There were no regional wall motion  abnormalities. There was mild concentric hypertrophy. The E/e' ratio was 8.3.  -  Pericardium: There was no pericardial effusion. Labs and Studies from previous 24 hours have been personally reviewed by myself [address] Problems as of 2/22/2018  Date Reviewed: 2/20/2018          Codes Class Noted - Resolved POA    * (Principal)Septic arthritis of knee, left ([address] ICD-10-CM: M00.9  ICD-9-CM: 711.06  2/21/2018 - Present Unknown        MRSA bacteremia ICD-10-CM: R78.81  ICD-9-CM: 790.7, 041.12  2/21/2018 - Present Unknown        Leukocytosis ICD-10-CM: D72.829  ICD-9-CM: 288.60  2/13/2018 - Present Yes        Hypoalbuminemia ICD-10-CM: E88.09  ICD-9-CM: 273.8  2/13/2018 - Present Yes        Anemia ICD-10-CM: D64.9  ICD-9-CM: 285.9  2/13/2018 - Present Yes        Left leg cellulitis ICD-10-CM: L03.116  ICD-9-CM: 682.6  2/13/2018 - Present Yes        Abnormal transaminases ICD-10-CM: R74.8  ICD-9-CM: 790.4  2/13/2018 - Present Yes              Plan:  L knee septic arthritis with MRSA bacteremia and leg cellulitis:  - aspirated on 2/16/18 and s/p I&D 2/21/18  - On IV vancomycin   - BC 2/2 from 2/19 negative   -TTE w/o vegetations. MANJINDER pending for today. Iron deficiency anemia:  - will start supplementation  - check hemoccult  - will need outpatient GI evaluation    Elevated liver enzymes:  - improving - unclear etiology  - acute hepatitis panel negative     Debility :  -PT/OT/PPD  - will nee rehab - [facility] vs [facility] vs SNF      DVT Prophylaxis: Heparin SQ  CODE Status: Full CODE  Plan of Care Discussed with: patient.  Care team.  Medical Risk: high  Disposition: pending    Velia Carter MD  02/22/18

## 2018-02-22 NOTE — WOUND CARE
Patient seen post surgery. Wound VAC off. Orthopedic surgeon following. Wound team will sign off. Re consult if needed.

## 2018-02-22 NOTE — PROCEDURES
Brief Cardiac Procedure Note    Patient: Patricia South MRN: [id number]  SSN: [id number]    YOB: 1955  Age: 57 y.o. Sex: male      Date of Procedure: 2/22/2018     Pre-procedure Diagnosis: Infection    Post-procedure Diagnosis: No Intracardiac Source of Infection    Procedure: Transesophageal Echocardiogram    Brief Description of Procedure:     Performed By: Asher Hargrove MD     Assistants:     Anesthesia: Moderate Sedation    Estimated Blood Loss: Less than 10 mL      Specimens: None    Implants: None    Findings:   Normal alida. No vegetation detected. Complications: None    Recommendations: Continue medical therapy.     Signed By: Asher Hargrove MD     February 22, 2018

## 2018-02-23 LAB
ALBUMIN SERPL-MCNC: 2.5 G/DL (ref 3.2–4.6)
ALBUMIN/GLOB SERPL: 0.5 {RATIO} (ref 1.2–3.5)
ALP SERPL-CCNC: 139 U/L (ref 50–136)
ALT SERPL-CCNC: 62 U/L (ref 12–65)
ANION GAP SERPL CALC-SCNC: 7 MMOL/L (ref 7–16)
AST SERPL-CCNC: 35 U/L (ref 15–37)
BILIRUB SERPL-MCNC: 0.7 MG/DL (ref 0.2–1.1)
BUN SERPL-MCNC: 12 MG/DL (ref 8–23)
CALCIUM SERPL-MCNC: 9.4 MG/DL (ref 8.3–10.4)
CHLORIDE SERPL-SCNC: 105 MMOL/L (ref 98–107)
CO2 SERPL-SCNC: 27 MMOL/L (ref 21–32)
CREAT SERPL-MCNC: 1.05 MG/DL (ref 0.8–1.5)
ERYTHROCYTE [DISTWIDTH] IN BLOOD BY AUTOMATED COUNT: 18.2 % (ref 11.9–14.6)
GLOBULIN SER CALC-MCNC: 5.5 G/DL (ref 2.3–3.5)
GLUCOSE SERPL-MCNC: 99 MG/DL (ref 65–100)
HCT VFR BLD AUTO: 25.1 % (ref 41.1–50.3)
HGB BLD-MCNC: 7.7 G/DL (ref 13.6–17.2)
MAGNESIUM SERPL-MCNC: 2.1 MG/DL (ref 1.8–2.4)
MCH RBC QN AUTO: 24.9 PG (ref 26.1–32.9)
MCHC RBC AUTO-ENTMCNC: 30.7 G/DL (ref 31.4–35)
MCV RBC AUTO: 81.2 FL (ref 79.6–97.8)
PLATELET # BLD AUTO: 615 K/UL (ref 150–450)
PMV BLD AUTO: 9 FL (ref 10.8–14.1)
POTASSIUM SERPL-SCNC: 3.7 MMOL/L (ref 3.5–5.1)
PROT SERPL-MCNC: 8 G/DL (ref 6.3–8.2)
RBC # BLD AUTO: 3.09 M/UL (ref 4.23–5.67)
SODIUM SERPL-SCNC: 139 MMOL/L (ref 136–145)
WBC # BLD AUTO: 10.2 K/UL (ref 4.3–11.1)

## 2018-02-23 PROCEDURE — 36569 INSJ PICC 5 YR+ W/O IMAGING: CPT | Performed by: NURSE PRACTITIONER

## 2018-02-23 PROCEDURE — 74011250637 HC RX REV CODE- 250/637: Performed by: FAMILY MEDICINE

## 2018-02-23 PROCEDURE — 80202 ASSAY OF VANCOMYCIN: CPT | Performed by: INTERNAL MEDICINE

## 2018-02-23 PROCEDURE — 85027 COMPLETE CBC AUTOMATED: CPT | Performed by: HOSPITALIST

## 2018-02-23 PROCEDURE — 76937 US GUIDE VASCULAR ACCESS: CPT

## 2018-02-23 PROCEDURE — 97161 PT EVAL LOW COMPLEX 20 MIN: CPT

## 2018-02-23 PROCEDURE — 77030018719 HC DRSG PTCH ANTIMIC J&J -A

## 2018-02-23 PROCEDURE — 77030018786 HC NDL GD F/USND BARD -B

## 2018-02-23 PROCEDURE — 80053 COMPREHEN METABOLIC PANEL: CPT | Performed by: HOSPITALIST

## 2018-02-23 PROCEDURE — 36415 COLL VENOUS BLD VENIPUNCTURE: CPT | Performed by: HOSPITALIST

## 2018-02-23 PROCEDURE — 74011250637 HC RX REV CODE- 250/637: Performed by: ORTHOPAEDIC SURGERY

## 2018-02-23 PROCEDURE — 99253 IP/OBS CNSLTJ NEW/EST LOW 45: CPT | Performed by: PHYSICAL MEDICINE & REHABILITATION

## 2018-02-23 PROCEDURE — 74011250636 HC RX REV CODE- 250/636: Performed by: INTERNAL MEDICINE

## 2018-02-23 PROCEDURE — 83735 ASSAY OF MAGNESIUM: CPT | Performed by: HOSPITALIST

## 2018-02-23 PROCEDURE — C1751 CATH, INF, PER/CENT/MIDLINE: HCPCS

## 2018-02-23 PROCEDURE — 97165 OT EVAL LOW COMPLEX 30 MIN: CPT

## 2018-02-23 PROCEDURE — 74011250636 HC RX REV CODE- 250/636: Performed by: FAMILY MEDICINE

## 2018-02-23 PROCEDURE — 65270000029 HC RM PRIVATE

## 2018-02-23 PROCEDURE — 74011250636 HC RX REV CODE- 250/636: Performed by: NURSE PRACTITIONER

## 2018-02-23 RX ORDER — HEPARIN 100 UNIT/ML
300 SYRINGE INTRAVENOUS EVERY 8 HOURS
Status: DISCONTINUED | OUTPATIENT
Start: 2018-02-23 | End: 2018-03-07 | Stop reason: HOSPADM

## 2018-02-23 RX ORDER — SODIUM CHLORIDE 0.9 % (FLUSH) 0.9 %
10 SYRINGE (ML) INJECTION AS NEEDED
Status: DISCONTINUED | OUTPATIENT
Start: 2018-02-23 | End: 2018-03-07 | Stop reason: HOSPADM

## 2018-02-23 RX ORDER — SODIUM CHLORIDE 0.9 % (FLUSH) 0.9 %
10 SYRINGE (ML) INJECTION EVERY 8 HOURS
Status: DISCONTINUED | OUTPATIENT
Start: 2018-02-23 | End: 2018-03-07 | Stop reason: HOSPADM

## 2018-02-23 RX ORDER — HEPARIN 100 UNIT/ML
300 SYRINGE INTRAVENOUS AS NEEDED
Status: DISCONTINUED | OUTPATIENT
Start: 2018-02-23 | End: 2018-03-07 | Stop reason: HOSPADM

## 2018-02-23 RX ADMIN — OXYCODONE HYDROCHLORIDE AND ACETAMINOPHEN 2 TABLET: 5; 325 TABLET ORAL at 14:41

## 2018-02-23 RX ADMIN — Medication: at 16:30

## 2018-02-23 RX ADMIN — VANCOMYCIN HYDROCHLORIDE 1000 MG: 1 INJECTION, POWDER, LYOPHILIZED, FOR SOLUTION INTRAVENOUS at 09:24

## 2018-02-23 RX ADMIN — Medication 300 UNITS: at 20:58

## 2018-02-23 RX ADMIN — OXYCODONE HYDROCHLORIDE AND ACETAMINOPHEN 2 TABLET: 5; 325 TABLET ORAL at 04:46

## 2018-02-23 RX ADMIN — HEPARIN SODIUM 5000 UNITS: 5000 INJECTION, SOLUTION INTRAVENOUS; SUBCUTANEOUS at 00:24

## 2018-02-23 RX ADMIN — OXYCODONE HYDROCHLORIDE AND ACETAMINOPHEN 2 TABLET: 5; 325 TABLET ORAL at 00:24

## 2018-02-23 RX ADMIN — HEPARIN SODIUM 5000 UNITS: 5000 INJECTION, SOLUTION INTRAVENOUS; SUBCUTANEOUS at 16:36

## 2018-02-23 RX ADMIN — Medication 10 ML: at 20:58

## 2018-02-23 RX ADMIN — VANCOMYCIN HYDROCHLORIDE 1000 MG: 1 INJECTION, POWDER, LYOPHILIZED, FOR SOLUTION INTRAVENOUS at 00:25

## 2018-02-23 RX ADMIN — VANCOMYCIN HYDROCHLORIDE 1000 MG: 1 INJECTION, POWDER, LYOPHILIZED, FOR SOLUTION INTRAVENOUS at 16:29

## 2018-02-23 RX ADMIN — OXYCODONE HYDROCHLORIDE AND ACETAMINOPHEN 2 TABLET: 5; 325 TABLET ORAL at 20:57

## 2018-02-23 RX ADMIN — HEPARIN SODIUM 5000 UNITS: 5000 INJECTION, SOLUTION INTRAVENOUS; SUBCUTANEOUS at 09:16

## 2018-02-23 RX ADMIN — Medication 10 ML: at 17:21

## 2018-02-23 RX ADMIN — OXYCODONE HYDROCHLORIDE AND ACETAMINOPHEN 2 TABLET: 5; 325 TABLET ORAL at 09:17

## 2018-02-23 NOTE — PROGRESS NOTES
Problem: Mobility Impaired (Adult and Pediatric)  Goal: *Acute Goals and Plan of Care (Insert Text)  LTG:  (1.)Mr. Pedro Chu will perform bed mobility with INDEPENDENCE within 7 treatment day(s). (2.)Mr. Pedro Chu will transfer from bed to chair and chair to bed with MODIFIED INDEPENDENCE using the least restrictive device within 7 treatment day(s). (3.)Mr. Pedro Chu will ambulate with MODIFIED INDEPENDENCE for 150+ feet with the least restrictive device within 7 treatment day(s). (4.)Mr. Pedro Chu will demonstrate GOOD DYNAMIC standing balance utilizing least restrictive assistive device within 7 treatment day(s). (5.)Mr. Pedro Chu will tolerate 25+ minutes of therapeutic activity/exercise while maintaining stable vitals to improve functional strength and endurance within 7 day(s). ________________________________________________________________________________________________      PHYSICAL THERAPY: Initial Assessment, AM 2/23/2018  INPATIENT: Hospital Day: 3  Payor: BLUE CROSS / Plan: [place] CR[place] [facility] / Product Type: PPO /      Sharon Mater in place at all times. NAME/AGE/GENDER: Arianne Bowman is a 57 y.o. male   PRIMARY DIAGNOSIS: Open wound of left knee, initial encounter [S81.002A] Septic arthritis of knee, left (HCC) Septic arthritis of knee, left (HCC)  Procedure(s) (LRB):  IRRIGATION AND DEBRIDEMENT LEFT KNEE/ BEING ADMITTED FROM [place] [id number] ON 2.20 (Left)  2 Days Post-Op  ICD-10: Treatment Diagnosis:   · Generalized Muscle Weakness (M62.81)  · Difficulty in walking, Not elsewhere classified (R26.2)   Precaution/Allergies:  Review of patient's allergies indicates no known allergies. ASSESSMENT:     Mr. Pedro Chu is a 57year old male admitted with left knee infection and 2 day s/p L knee irigation and debridement. Verbal order per orthopedic surgery, WBAT L LE with KI in place at all times.  Patient seen this AM for initial physical therapy evaluation: presents supine in bed, oriented x4, and endorses 4/10 L LE pain. Patient lives alone in a single story residence with 0 steps to enter. At baseline, patient is independent with ADLs, ambulates without utilizing an assistive device, and endorses 0 recent falls. Patient with difficult history to fall regarding falls and L LE pain. Today, KI appreciated LLE (adjusted), R LE strength and ROM WNL and sensation is intact to light touch distal B LEs. Patient demonstrated good sitting balance, transfers with minimal assistance at RW, and ambulation x6ft from bed to chair with RW and close CGA-Minimal assistance. Demonstrated a slow, step-to, antalgic gait pattern on L LE with decreased weight bearing on left and fair dynamic balance throughout. Required cues for transfer techniques, gait mechanics, and safe RW negotiation.  [facility] presents with decreased functional mobility and balance/gait status from baseline. Recommend continued skilled PT services to address stated deficits. Will follow and progress toward stated goals during acute stay. This section established at most recent assessment   PROBLEM LIST (Impairments causing functional limitations):  1. Decreased Strength  2. Decreased ADL/Functional Activities  3. Decreased Transfer Abilities  4. Decreased Ambulation Ability/Technique  5. Decreased Balance  6. Increased Pain  7. Decreased Activity Tolerance  8. Decreased Flexibility/Joint Mobility  9. Decreased Knowledge of Precautions  10. Decreased Phillips with Home Exercise Program   INTERVENTIONS PLANNED: (Benefits and precautions of physical therapy have been discussed with the patient.)  1. Balance Exercise  2. Bed Mobility  3. Family Education  4. Gait Training  5. Range of Motion (ROM)  6. Therapeutic Activites  7. Transfer Training  8.  Group Therapy     TREATMENT PLAN: Frequency/Duration: 3 times a week for duration of hospital stay  Rehabilitation Potential For Stated Goals: GOOD     RECOMMENDED REHABILITATION/EQUIPMENT: (at time of discharge pending progress): Due to the probability of continued deficits (see above) this patient will likely need continued skilled physical therapy after discharge. Equipment:    Walkers, Type: Rolling Walker              HISTORY:   History of Present Injury/Illness (Reason for Referral):  S/p left knee I&D  Past Medical History/Comorbidities:   Mr. Kris Potts  has no past medical history on file. Mr. Kris Potts  has a past surgical history that includes hx orthopaedic. Social History/Living Environment:   Home Environment: Private residence  Wheelchair Ramp: Yes  One/Two Story Residence: One story  Living Alone: Yes  Support Systems: Family member(s)  Patient Expects to be Discharged to[de-identified] Private residence  Current DME Used/Available at Home: Cane, straight  Prior Level of Function/Work/Activity:   Patient lives alone in a single story residence with 0 steps to enter. At baseline, patient is independent with ADLs, ambulates without utilizing an assistive device, and endorses 0 recent falls. Patient with difficult history to fall regarding falls and L LE pain. Number of Personal Factors/Comorbidities that affect the Plan of Care: 1-2: MODERATE COMPLEXITY   EXAMINATION:   Most Recent Physical Functioning:   Gross Assessment:  AROM: Generally decreased, functional (L LE)  Strength: Generally decreased, functional (L LE)  Coordination: Within functional limits  Sensation: Intact (Light touch B LE)               Posture:  Posture (WDL): Exceptions to WDL  Balance:  Sitting: Intact  Standing: Impaired  Standing - Static: Fair  Standing - Dynamic : Fair Bed Mobility:  Supine to Sit: Contact guard assistance  Scooting: Contact guard assistance  Wheelchair Mobility:     Transfers:  Sit to Stand: Minimum assistance  Stand to Sit: Minimum assistance  Bed to Chair: Contact guard assistance;Minimum assistance  Gait:     Base of Support: Shift to right;Center of gravity altered; Widened  Speed/Rosalie: Shuffled; Slow  Step Length: Left shortened  Swing Pattern: Right asymmetrical  Stance: Left decreased;Right increased  Gait Abnormalities: Decreased step clearance;Shuffling gait; Step to gait  Distance (ft): 6 Feet (ft)  Assistive Device: Walker, rolling  Ambulation - Level of Assistance: Contact guard assistance;Minimal assistance  Interventions: Safety awareness training; Tactile cues; Verbal cues      Body Structures Involved:  1. Bones  2. Joints  3. Muscles Body Functions Affected:  1. Neuromusculoskeletal  2. Movement Related Activities and Participation Affected:  1. Mobility  2. Self Care   Number of elements that affect the Plan of Care: 4+: HIGH COMPLEXITY   CLINICAL PRESENTATION:   Presentation: Stable and uncomplicated: LOW COMPLEXITY   CLINICAL DECISION MAKIN[address] [place] [address] AM-PAC 6 Clicks   Basic Mobility Inpatient Short Form  How much difficulty does the patient currently have. .. Unable A Lot A Little None   1. Turning over in bed (including adjusting bedclothes, sheets and blankets)? [] 1   [] 2   [] 3   [x] 4   2. Sitting down on and standing up from a chair with arms ( e.g., wheelchair, bedside commode, etc.)   [] 1   [] 2   [x] 3   [] 4   3. Moving from lying on back to sitting on the side of the bed? [] 1   [] 2   [x] 3   [] 4   How much help from another person does the patient currently need. .. Total A Lot A Little None   4. Moving to and from a bed to a chair (including a wheelchair)? [] 1   [] 2   [x] 3   [] 4   5. Need to walk in hospital room? [] 1   [] 2   [x] 3   [] 4   6. Climbing 3-5 steps with a railing? [] 1   [x] 2   [] 3   [] 4   © , Trustees of [address] [place] [address], under license to [employer]. All rights reserved      Score:  Initial: 18 Most Recent: 18 (Date: 18 )    Interpretation of Tool:  Represents activities that are increasingly more difficult (i.e. Bed mobility, Transfers, Gait).    Score 24 23 22-20 19-15 14-10 9-7 6     Modifier CH CI CJ CK CL CM CN ? Mobility - Walking and Moving Around:     - CURRENT STATUS: CK - 40%-59% impaired, limited or restricted    - GOAL STATUS: CJ - 20%-39% impaired, limited or restricted    - D/C STATUS:  ---------------To be determined---------------  Payor: BLUE CROSS / [place] OF [address] [place] [address] Rd / Product Type: PPO /      Medical Necessity:     · Patient demonstrates good rehab potential due to higher previous functional level. Reason for Services/Other Comments:  · Patient continues to require skilled intervention due to decreased functional mobility and balance/gait status from baseline. .   Use of outcome tool(s) and clinical judgement create a POC that gives a: Clear prediction of patient's progress: LOW COMPLEXITY            TREATMENT:   (In addition to Assessment/Re-Assessment sessions the following treatments were rendered)   Pre-treatment Symptoms/Complaints:    Pain: Initial:   Pain Intensity 1: 4  Pain Location 1: Knee  Pain Orientation 1: Left  Pain Intervention(s) 1: Ambulation/Increased Activity, Emotional support, Position, Repositioned, Rest  Post Session:  0/10     Assessment/Reassessment only, no treatment provided today    Braces/Orthotics/Lines/Etc:   · IV  · O2 Device: Room air  Treatment/Session Assessment:    · Response to Treatment:  See above. · Interdisciplinary Collaboration:   o Physical Therapist  o Registered Nurse  · After treatment position/precautions:   o Up in chair  o Bed alarm/tab alert on  o Bed/Chair-wheels locked  o Bed in low position  o Call light within reach  o RN notified  o All needs within reach   · Compliance with Program/Exercises: Will assess as treatment progresses. · Recommendations/Intent for next treatment session: \"Next visit will focus on advancements to more challenging activities and reduction in assistance provided\".   Total Treatment Duration:  PT Patient Time In/Time Out  Time In: 1031  Time Out: 28 [facility]

## 2018-02-23 NOTE — PROGRESS NOTES
PICC Placement Note    PRE-PROCEDURE VERIFICATION  Correct Procedure: yes. Time out completed with assistant Dave Diaz RN and all persons present in agreement with time out. Correct Site:  yes  Temperature: Temp: 99.4 °F (37.4 °C), Temperature Source: Temp Source: Oral  Recent Labs      02/23/18   0634   BUN  12   CREA  1.05   PLT  615*   WBC  10.2     Allergies: Review of patient's allergies indicates no known allergies. Education materials for [facility] given to patient or family. PROCEDURE DETAIL  A single lumen PICC line was started for antibiotic therapy. The following documentation is in addition to the PICC properties in the lines/airways flowsheet :  Lot #: [id number]  xylocaine used: yes  Mid-Arm Circumference: 34 (cm)  Internal Catheter Length: 43 (cm)  Internal Catheter Total Length: 43 (cm)  Vein Selection for PICC:right basilic  Central Line Bundle followed yes  Complication Related to Insertion: none  Both the insertion guidewire and sherlock guidewire were removed intact all ports have positive blood return and were flush well with normal saline. The placement was verified by sapiens ecg. The ECG results state the tip overlies the lower superior vena cava.          Line is okay to use: yes    Sherif Shah RN

## 2018-02-23 NOTE — PROGRESS NOTES
Infectious Disease Progress Note    Today's Date: 2018   Admit Date: 2018    Impression:   · MRSA bacteremia ( - ); repeat cultures  NG; TTE  negative for vegetation, MANJINDER NG  · MRSA pyomyositis left leg  · MRSA left knee septic arthritis, aspirated 18, s/p I&D   · Recent lumbar spine surgery with hardware, 10/2017 (in [place])  · Elevated LFTs - hep panel negative; ? NAFLD  · Microcytic anemia with elevated ferritin - likely Chronic disease anemia or inflammatory anemia  · Thrombocytosis    Plan:   · Continue IV vancomycin (dosing adjusted from last trough)  ·  Duration: 4 weeks, EOT 3/19/18  · Dispo: SNF vs [facility] vs [facility], most likely next week. Anti-infectives:   IV vancomycin    Subjective:     States L knee is painful but improving. UPdated on ID plan. Wants to go back to [place] when better. 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 Denies nausea, vomiting, diarrhea, fever, chills, or sweats    No Known Allergies     Review of Systems:  Pertinent items are noted in the History of Present Illness. Objective:     Visit Vitals    /87 (BP 1 Location: Left arm, BP Patient Position: At rest)    Pulse 96    Temp 98.1 °F (36.7 °C)    Resp 18    Ht 5' 8\" (1.727 m)    Wt 113.4 kg (250 lb)    SpO2 99%    BMI 38.01 kg/m2     Temp (24hrs), Av.1 °F (37.3 °C), Min:98.1 °F (36.7 °C), Max:100.1 °F (37.8 °C)       Lines:  Peripheral IV:          Physical Exam:    General:  Alert, cooperative.    Eyes:  Sclera anicteric   Mouth/Throat: oral pharynx clear   Neck: Supple   Lungs:   Breathing comfortably, lungs clear anteriorly   CV:  Tachycardic, without audible murmur   Abdomen:   non-distended   Extremities: No cyanosis   Skin: LLE venous stasis changes noted   Musculoskeletal: LLE wrapped in surgical wrap with hemovac in place   Lines/Devices:  Intact, no erythema, drainage or tenderness   Psych: Alert, oriented     Data Review:     CBC:  Recent Labs      18   1084 18   0643  18   0600   WBC  10.2  13.0*  14.6*   GRANS   --   73  75   MONOS   --   8  9   EOS   --   3  2   ANEU   --   9.5*  11.0*   ABL   --   2.0  2.0   HGB  7.7*  7.9*  7.7*   HCT  25.1*  25.1*  25.6*   PLT  615*  641*  632*       BMP:  Recent Labs      1834  18   0643  18   0600   CREA  1.05  1.04  1.33   BUN  12  13  12   NA  139  139  141   K  3.7  3.9  4.1   CL  105  105  106   CO2  27  26  26   AGAP  7  8  9   GLU  99  99  107*       LFTS:  Recent Labs      1834  18   0643  18   0600   TBILI  0.7  0.6  0.6   ALT  62  68*  84*   SGOT  35  36  45*   AP  139*  139*  147*   TP  8.0  8.0  8.0   ALB  2.5*  2.6*  2.5*       Microbiology:     All Micro Results     None          Imagin/15/18 L knee film: IMPRESSION:  1. Large joint effusion and soft tissue edema without acute osseous abnormality. An infectious/inflammatory process of the left knee joint cannot be excluded  given the appearance. Recommend clinical correlation. 18 MRI left leg  IMPRESSION:  1. Multiloculated fluid collections within the left lower extremity most compatible with abscesses. A large joint effusion is present with enhancement as well as enhancement within the joint space therefore concerning for septic arthritis. There are no definite findings of osteomyelitis. 2. Large right joint effusion.     Signed By: Joya Del Cid NP     2018

## 2018-02-23 NOTE — PROGRESS NOTES
Spoke with patient regarding d/c planning. Alert and oriented x 4  Lives alone in apartment in [place]. Wife passed away one year ago. States he was up visiting relatives when his knee \"gave out\" on me. Prior to admission totally independent and driving. [place] [facility] [place]. Last seen one year ago for yearly. DME- cane but states he does not always use it. Denies any problems filling Rx. If requires STR would prefer [facility]. PT/OT evals pending. Possible 9th floor candidate. CM will continue to follow for any further needs. Care Management Interventions  PCP Verified by CM:  Yes  Mode of Transport at Discharge: BLS  Transition of Care Consult (CM Consult): Discharge Planning  Physical Therapy Consult: Yes  Occupational Therapy Consult: Yes  Current Support Network: Own Home  Confirm Follow Up Transport: Family  Plan discussed with Pt/Family/Caregiver: Yes  Freedom of Choice Offered: Yes  Discharge Location  Discharge Placement: Unable to determine at this time

## 2018-02-23 NOTE — CONSULTS
PM&R Rehab Consult    Subjective:     Date of Consultation:  February 23, 2018    Referring Physician: Dr. Rangel Ill    Patient is a 57 y.o. male who is being seen for rehab recommendations s/p I & D of left knee due to septic arthritis, perarticular abscess, with delayed closure    Principal Problem:    Septic arthritis of knee, left ([address] (2/21/2018)    Active Problems:    Leukocytosis (2/13/2018)      Hypoalbuminemia (2/13/2018)      Anemia (2/13/2018)      Left leg cellulitis (2/13/2018)      Abnormal transaminases (2/13/2018)      MRSA bacteremia (2/21/2018)    HPI; Tash Geller is a previously functionally indpendent 57 y. o. male who has no significant PMH who was admitted on 2/13/18 for left leg non purulent cellulitis to [facility]. Of note, he lives in [place] and came to visit family members to [place], [place] on 2/9/18. He denied falls, prior knee or leg surgery. Duplex was was unremarkable for DVT. He was started on vancomycin. Blood cultures positive for MRSA bacteremia. Transthoracic ECHO negative for vegetations. Due to poor improvement of his thigh and leg edema and pain he underwent an MRI of his leg showing multiloculated abscesses and findings of septic knee. He underwent I&D and knee lavage on 2/17/18 by general surgery, Dr Adair Turk. Orthopedics performed knee aspiration. So far, all Blood cultures and wound aspirate cultures are positive for staph aureus. Patient has had persistent leukocytosis but has normalized at the time of my consult to 10.2.. He transferred to [facility] for repeat joint I&D by Dr. Laureen Olson on 2/21. Infectious disease is on board and he will remain on IV vanc q 8hrs until 3/19/2018. On 2/19 PT had signed off bc the pt was walking around the ortho unit at [facility] with no problems what so ever. They have been reconsulted but have not yet documented a note. The pt reports that he can get around fine. With OT, he struggled with LE ADLs, bathing and toileting.  He reports that this was only because he thought he was NWB on the left. History reviewed. No pertinent past medical history. History reviewed. No pertinent family history. Social History   Substance Use Topics    Smoking status: Never Smoker    Smokeless tobacco: Never Used    Alcohol use No   worked for [employer] for over 30 yrs. His second wife of 12 years  of gallbladder CA last year. He lives in [place] but was here visiting his 2 sisters and niece. He has 4 grown kids. 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 Premorbidly , extremely active and healthy. Past Surgical History:   Procedure Laterality Date    HX ORTHOPAEDIC      back      Prior to Admission medications    Medication Sig Start Date End Date Taking? Authorizing Provider   acetaminophen-codeine (TYLENOL-CODEINE #3) 300-30 mg per tablet Take 1 Tab by mouth every four (4) hours as needed for Pain. Sylvia Sifuentes MD   cyclobenzaprine (FLEXERIL) 5 mg tablet Take 5 mg by mouth. Sylvia Sifuentes MD   gabapentin (NEURONTIN) 300 mg capsule Take 300 mg by mouth three (3) times daily. Sylvia Sifuentes MD     No Known Allergies     Review of Systems:  A comprehensive review of systems was negative except for: Eyes: positive for contacts/glasses  Cardiovascular: positive for lower extremity edema  Gastrointestinal: positive for reflux symptoms  Hematologic/lymphatic: positive for current bacteremia  Musculoskeletal: positive for stiff joints  He denies cp, sob, rivera, nausea. Pain is well controlled. Mild constipation  Objective:     Vitals:  Blood pressure 145/82, pulse (!) 102, temperature 99.1 °F (37.3 °C), resp. rate 19, height 5' 8\" (1.727 m), weight 250 lb (113.4 kg), SpO2 95 %. Temp (24hrs), Av.1 °F (37.3 °C), Min:98.1 °F (36.7 °C), Max:100.1 °F (37.8 °C)      Intake and Output:   1901 -  0700  In: 0   Out:  [Urine:; Drains:50]    Physical Exam:  General:  Alert, oriented and mood affect appropriate.  NAD   Lungs:   Clear to auscultation bilaterally. Heart:  Regular rate and rhythm, S1, S2 stable, no murmur, click, rub or gallop. Abdomen:   Soft, non-tender. Bowel sounds present. No masses,  No organomegaly. obese   Genitourinary: defer   Neuro Muscular: UEs 5/5; hip flexion RLE 4, KE 5-, DF/PF 5  LLE hip flexion 3+, distally 5/5   Skin:  LLE in KI, dressing intact; edema to the entire left leg; pitting edema to mid shin on the left  Sensation intact  DP pulse 2         Labs/Studies:  Recent Results (from the past 72 hour(s))   PLEASE READ & DOCUMENT PPD TEST IN 48 HRS    Collection Time: 02/20/18  6:27 PM   Result Value Ref Range    PPD Negative Negative    mm Induration 0.0mm mm   HEPATIC FUNCTION PANEL    Collection Time: 02/21/18  6:00 AM   Result Value Ref Range    Protein, total 8.0 6.3 - 8.2 g/dL    Albumin 2.5 (L) 3.2 - 4.6 g/dL    Globulin 5.5 (H) 2.3 - 3.5 g/dL    A-G Ratio 0.5 (L) 1.2 - 3.5      Bilirubin, total 0.6 0.2 - 1.1 MG/DL    Bilirubin, direct 0.2 <0.4 MG/DL    Alk. phosphatase 147 (H) 50 - 136 U/L    AST (SGOT) 45 (H) 15 - 37 U/L    ALT (SGPT) 84 (H) 12 - 65 U/L   CBC WITH AUTOMATED DIFF    Collection Time: 02/21/18  6:00 AM   Result Value Ref Range    WBC 14.6 (H) 4.3 - 11.1 K/uL    RBC 3.18 (L) 4.23 - 5.67 M/uL    HGB 7.7 (L) 13.6 - 17.2 g/dL    HCT 25.6 (L) 41.1 - 50.3 %    MCV 80.5 79.6 - 97.8 FL    MCH 24.2 (L) 26.1 - 32.9 PG    MCHC 30.1 (L) 31.4 - 35.0 g/dL    RDW 17.9 (H) 11.9 - 14.6 %    PLATELET 741 (H) 439 - 450 K/uL    MPV 9.2 (L) 10.8 - 14.1 FL    NEUTROPHILS 75 43 - 78 %    LYMPHOCYTES 14 13 - 44 %    MONOCYTES 9 4.0 - 12.0 %    EOSINOPHILS 2 0.5 - 7.8 %    BASOPHILS 0 0.0 - 2.0 %    ABS. NEUTROPHILS 11.0 (H) 1.7 - 8.2 K/UL    ABS. LYMPHOCYTES 2.0 0.5 - 4.6 K/UL    ABS. MONOCYTES 1.3 0.1 - 1.3 K/UL    ABS. EOSINOPHILS 0.3 0.0 - 0.8 K/UL    ABS.  BASOPHILS 0.0 0.0 - 0.2 K/UL    RBC COMMENTS MODERATE  MICROCYTOSIS        RBC COMMENTS SLIGHT  HYPOCHROMIA        WBC COMMENTS Result Confirmed By Smear PLATELET COMMENTS INCREASED      DF MANUAL     METABOLIC PANEL, BASIC    Collection Time: 02/21/18  6:00 AM   Result Value Ref Range    Sodium 141 136 - 145 mmol/L    Potassium 4.1 3.5 - 5.1 mmol/L    Chloride 106 98 - 107 mmol/L    CO2 26 21 - 32 mmol/L    Anion gap 9 7 - 16 mmol/L    Glucose 107 (H) 65 - 100 mg/dL    BUN 12 8 - 23 MG/DL    Creatinine 1.33 0.8 - 1.5 MG/DL    GFR est AA >60 >60 ml/min/1.73m2    GFR est non-AA 58 (L) >60 ml/min/1.73m2    Calcium 10.0 8.3 - 10.4 MG/DL   URIC ACID    Collection Time: 02/21/18  7:02 PM   Result Value Ref Range    Uric acid 6.2 (H) 2.6 - 6.0 MG/DL   HEPATIC FUNCTION PANEL    Collection Time: 02/22/18  6:43 AM   Result Value Ref Range    Protein, total 8.0 6.3 - 8.2 g/dL    Albumin 2.6 (L) 3.2 - 4.6 g/dL    Globulin 5.4 (H) 2.3 - 3.5 g/dL    A-G Ratio 0.5 (L) 1.2 - 3.5      Bilirubin, total 0.6 0.2 - 1.1 MG/DL    Bilirubin, direct 0.2 <0.4 MG/DL    Alk. phosphatase 139 (H) 50 - 136 U/L    AST (SGOT) 36 15 - 37 U/L    ALT (SGPT) 68 (H) 12 - 65 U/L   CBC WITH AUTOMATED DIFF    Collection Time: 02/22/18  6:43 AM   Result Value Ref Range    WBC 13.0 (H) 4.3 - 11.1 K/uL    RBC 3.12 (L) 4.23 - 5.67 M/uL    HGB 7.9 (L) 13.6 - 17.2 g/dL    HCT 25.1 (L) 41.1 - 50.3 %    MCV 80.4 79.6 - 97.8 FL    MCH 25.3 (L) 26.1 - 32.9 PG    MCHC 31.5 31.4 - 35.0 g/dL    RDW 18.2 (H) 11.9 - 14.6 %    PLATELET 556 (H) 234 - 450 K/uL    MPV 9.0 (L) 10.8 - 14.1 FL    DF AUTOMATED      NEUTROPHILS 73 43 - 78 %    LYMPHOCYTES 15 13 - 44 %    MONOCYTES 8 4.0 - 12.0 %    EOSINOPHILS 3 0.5 - 7.8 %    BASOPHILS 0 0.0 - 2.0 %    IMMATURE GRANULOCYTES 1 0.0 - 5.0 %    ABS. NEUTROPHILS 9.5 (H) 1.7 - 8.2 K/UL    ABS. LYMPHOCYTES 2.0 0.5 - 4.6 K/UL    ABS. MONOCYTES 1.0 0.1 - 1.3 K/UL    ABS. EOSINOPHILS 0.3 0.0 - 0.8 K/UL    ABS. BASOPHILS 0.0 0.0 - 0.2 K/UL    ABS. IMM.  GRANS. 0.1 0.0 - 0.5 K/UL   METABOLIC PANEL, BASIC    Collection Time: 02/22/18  6:43 AM   Result Value Ref Range    Sodium 139 136 - 145 mmol/L    Potassium 3.9 3.5 - 5.1 mmol/L    Chloride 105 98 - 107 mmol/L    CO2 26 21 - 32 mmol/L    Anion gap 8 7 - 16 mmol/L    Glucose 99 65 - 100 mg/dL    BUN 13 8 - 23 MG/DL    Creatinine 1.04 0.8 - 1.5 MG/DL    GFR est AA >60 >60 ml/min/1.73m2    GFR est non-AA >60 >60 ml/min/1.73m2    Calcium 9.8 8.3 - 10.4 MG/DL   SED RATE, AUTOMATED    Collection Time: 02/22/18  1:50 PM   Result Value Ref Range    Sed rate, automated 120 (H) 0 - 20 mm/hr   C REACTIVE PROTEIN, QT    Collection Time: 02/22/18  1:50 PM   Result Value Ref Range    C-Reactive protein 9.9 (H) 0.0 - 0.9 mg/dL   Bryce Anderson    Collection Time: 02/22/18  5:22 PM   Result Value Ref Range    Vancomycin,trough 26.0 (HH) 5 - 20 ug/mL   CBC W/O DIFF    Collection Time: 02/23/18  6:34 AM   Result Value Ref Range    WBC 10.2 4.3 - 11.1 K/uL    RBC 3.09 (L) 4.23 - 5.67 M/uL    HGB 7.7 (L) 13.6 - 17.2 g/dL    HCT 25.1 (L) 41.1 - 50.3 %    MCV 81.2 79.6 - 97.8 FL    MCH 24.9 (L) 26.1 - 32.9 PG    MCHC 30.7 (L) 31.4 - 35.0 g/dL    RDW 18.2 (H) 11.9 - 14.6 %    PLATELET 596 (H) 947 - 450 K/uL    MPV 9.0 (L) 10.8 - 00.5 FL   METABOLIC PANEL, COMPREHENSIVE    Collection Time: 02/23/18  6:34 AM   Result Value Ref Range    Sodium 139 136 - 145 mmol/L    Potassium 3.7 3.5 - 5.1 mmol/L    Chloride 105 98 - 107 mmol/L    CO2 27 21 - 32 mmol/L    Anion gap 7 7 - 16 mmol/L    Glucose 99 65 - 100 mg/dL    BUN 12 8 - 23 MG/DL    Creatinine 1.05 0.8 - 1.5 MG/DL    GFR est AA >60 >60 ml/min/1.73m2    GFR est non-AA >60 >60 ml/min/1.73m2    Calcium 9.4 8.3 - 10.4 MG/DL    Bilirubin, total 0.7 0.2 - 1.1 MG/DL    ALT (SGPT) 62 12 - 65 U/L    AST (SGOT) 35 15 - 37 U/L    Alk.  phosphatase 139 (H) 50 - 136 U/L    Protein, total 8.0 6.3 - 8.2 g/dL    Albumin 2.5 (L) 3.2 - 4.6 g/dL    Globulin 5.5 (H) 2.3 - 3.5 g/dL    A-G Ratio 0.5 (L) 1.2 - 3.5     MAGNESIUM    Collection Time: 02/23/18  6:34 AM   Result Value Ref Range    Magnesium 2.1 1.8 - 2.4 mg/dL Functional Assessment:  Functional Assessment  Fall in Past 12 Months: No  Decline in Gait/Transfer/Balance: No  Decline in Capacity to Feed/Dress/Bathe: No  Developmental Delay: No  Chewing/Swallowing Problems: No  Difficulty with Secretions: No  Speech Slurred/Thick/Garbled: No         Ambulation:  Activity and Safety  Activity Level: Up with Assistance  Ambulate: No (Comment)  Activity: In bed  Activity Assistance: Partial (one person)  Weight Bearing Status: WBAT (Weight Bearing as Tolerated)  Repositioned: Head of bed elevated (degrees)  Patient Turned: Turns self  Head of Bed Elevated: Self regulated  Assistive Device: Fall prevention device  Safety Measures: Bed/Chair alarm on, Bed/Chair-Wheels locked, Bed in low position, Call light within reach, Family at bedside, Side rails X 3     Impression/Plan:     Principal Problem:    Septic arthritis of knee, left (HCC) (2/21/2018)    Active Problems:    Leukocytosis (2/13/2018)      Hypoalbuminemia (2/13/2018)      Anemia (2/13/2018)      Left leg cellulitis (2/13/2018)      Abnormal transaminases (2/13/2018)      MRSA bacteremia (2/21/2018)     MRSA bacteremia with septic knee joint s/p I & D x 2 with blood loss anemia, leukocytosis, tachycardia, with ongoing low grade temp; decline in functional mobility and self care    Recommendations: Continue Acute Rehab Program  Coordination of rehab/medical care  Counseling of PM & R care issues management  Monitoring and management of medical conditions per plan of care/orders   -I suspect that the pt is going to mobilize quickly given the fact that earlier this week prior to the I&D, he was walking throughout the ortho unit independently. He is hindered by the KI, edema, pain. Cont PT/OT thru weekend and I will follow up Monday.  If pt too functional for [facility], would recommend STR in a SNF for rehab and to finish out q8hrs IV abx which cannot be done in an outpt setting.   - if pt remains impaired and continues to meet medical necessity can consider IRC; would need insurance clearance.    Discussion with Family/Caregiver/Staff  Reviewed Therapies/Labs/Meds/Records    Signed By:  Aminta Gunn MD     February 23, 2018

## 2018-02-23 NOTE — PROGRESS NOTES
Problem: Falls - Risk of  Goal: *Absence of Falls  Document Kinjal Fall Risk and appropriate interventions in the flowsheet.    Outcome: Progressing Towards Goal  Fall Risk Interventions:  Mobility Interventions: Bed/chair exit alarm, OT consult for ADLs, Patient to call before getting OOB, PT Consult for mobility concerns, PT Consult for assist device competence, Utilize walker, cane, or other assitive device    Mentation Interventions: Adequate sleep, hydration, pain control, Bed/chair exit alarm, Door open when patient unattended, Evaluate medications/consider consulting pharmacy, Eyeglasses and hearing aids, Familiar objects from home, Family/sitter at bedside, Gait belt with transfers/ambulation, Increase mobility, More frequent rounding, Reorient patient, Room close to nurse's station, Self-releasing belt, Toileting rounds, Update white board    Medication Interventions: Assess postural VS orthostatic hypotension, Bed/chair exit alarm, Evaluate medications/consider consulting pharmacy, Patient to call before getting OOB    Elimination Interventions: Bed/chair exit alarm, Call light in reach, Patient to call for help with toileting needs

## 2018-02-23 NOTE — PROGRESS NOTES
Problem: Self Care Deficits Care Plan (Adult)  Goal: *Acute Goals and Plan of Care (Insert Text)  1. Patient will complete functional transfers with supervision with WBAT status in LLE with knee imobilizer in place and with adaptive equipment as needed. 3. Patient will complete lower body bathing and dressing with minimal assistance and adaptive equipment as needed. 4. Patient will complete toileting and toilet transfer with supervision. 5. Patient will tolerate 30 minutes of OT treatment with 2-3 rest breaks to increase activity tolerance for ADLs. 6. Patient will participate in at least 15 minutes of BUE therapeutic exercises to strengthen BUE for functional transfers. Timeframe: 7 visits         OCCUPATIONAL THERAPY: Initial Assessment and AM 2/23/2018  INPATIENT: Hospital Day: 3  Payor: BLUE CROSS / [place] [employer] [place] / Product Type: PPO /      NAME/AGE/GENDER: Samia Decker is a 57 y.o. male   PRIMARY DIAGNOSIS:  Open wound of left knee, initial encounter [S81.002A] Septic arthritis of knee, left (HCC) Septic arthritis of knee, left (HCC)  Procedure(s) (LRB):  IRRIGATION AND DEBRIDEMENT LEFT KNEE/ BEING ADMITTED FROM [place] [id number] ON 2.20 (Left)  2 Days Post-Op  ICD-10: Treatment Diagnosis:    · Pain in Left Knee (M25.562)  · Stiffness of Left Knee, Not elsewhere classified (M25.662)  · Generalized Muscle Weakness (M62.81)  · Other lack of cordination (R27.8)  · History of falling (Z91.81)   Precautions/Allergies:     Review of patient's allergies indicates no known allergies. ASSESSMENT:     Mr. Sudha Angulo presents to the hospital after I&D of the L knee and is WBAT in L LE with knee immobilizer in place during transfers. Pt was supine in bed in odd posture due to reports of hx of back pain. Pt is alert and oriented but hx provided was a bit confusing. Pt typically independent with ADL and functional mobility and admits to a fall out of bed.  Pt agreeable to transferring from supine to sit but is particular about him doing it all himself and asks therapist to move out of the way. Explained that our presence is to ensure safety since pt is getting up for first time. Pt complete bed mobility with CGA. Pt needed assistance with L LE to lower to the floor while scooting. Pt reports no dizziness edge of bed. Pt did fairly well standing from bed and pushing through UEs to walker with minimal assistance x 2. Pt left up working with PT. Pt cooperative but fairly demanding/particular about set-up of items in room. Pt is currently functioning below baseline for ADL due to increased pain and stiffness in L UE. Pt will benefit from OT services to address stated goals and plan of care. This section established at most recent assessment   PROBLEM LIST (Impairments causing functional limitations):  1. Decreased Strength  2. Decreased ADL/Functional Activities  3. Decreased Transfer Abilities  4. Decreased Ambulation Ability/Technique  5. Decreased Balance  6. Increased Pain  7. Decreased Activity Tolerance  8. Decreased Flexibility/Joint Mobility  9. Edema/Girth  10. Decreased Knowledge of Precautions  11. Decreased [place] with Home Exercise Program   INTERVENTIONS PLANNED: (Benefits and precautions of occupational therapy have been discussed with the patient.)  1. Activities of daily living training  2. Adaptive equipment training  3. Balance training  4. Clothing management  5. Donning&doffing training  6. Group therapy  7. Neuromuscular re-eduation  8. Therapeutic activity  9. Therapeutic exercise     TREATMENT PLAN: Frequency/Duration: Follow patient 3 times per week to address above goals. Rehabilitation Potential For Stated Goals: Excellent     RECOMMENDED REHABILITATION/EQUIPMENT: (at time of discharge pending progress): Due to the probability of continued deficits (see above) this patient will likely need continued skilled occupational therapy after discharge.   Equipment:    TBD OCCUPATIONAL PROFILE AND HISTORY:   History of Present Injury/Illness (Reason for Referral):  See H&P  Past Medical History/Comorbidities:   Mr. Margaret Perez  has no past medical history on file. Mr. Margaret Perez  has a past surgical history that includes hx orthopaedic. Social History/Living Environment:   Home Environment: Private residence  Wheelchair Ramp: Yes  One/Two Story Residence: One story  Living Alone: Yes  Support Systems: Family member(s)  Patient Expects to be Discharged to[de-identified] Private residence  Current DME Used/Available at Home: Cane, straight  Prior Level of Function/Work/Activity:  Pt reports that he lives in one story home with ramp to enter in [place]. Pt states he completes functional mobility/ADL with independence. Pt does have a cane and rolling walker. Pt state that his wife passed away but unclear if he lives alone or if he is remarried. Personal Factors:          Past/Current Experience:  Infection of knee with recent I &D        Overall Behavior:  Demanding; lacks some awareness of need for safety/assistance          Number of Personal Factors/Comorbidities that affect the Plan of Care: Expanded review of therapy/medical records (1-2):  MODERATE COMPLEXITY   ASSESSMENT OF OCCUPATIONAL PERFORMANCE[de-identified]   Activities of Daily Living:           Basic ADLs (From Assessment) Complex ADLs (From Assessment)   Basic ADL  Feeding: Independent  Oral Facial Hygiene/Grooming: Stand-by assistance  Bathing: Moderate assistance  Upper Body Dressing: Stand-by assistance  Lower Body Dressing: Maximum assistance  Toileting: Moderate assistance Instrumental ADL  Meal Preparation: Maximum assistance  Homemaking: Total assistance   Grooming/Bathing/Dressing Activities of Daily Living     Cognitive Retraining  Safety/Judgement: Decreased awareness of need for assistance; Fall prevention;Home safety                 Functional Transfers  Toilet Transfer :  Moderate assistance  Tub Transfer: Maximum assistance  Shower Transfer: Moderate assistance     Bed/Mat Mobility  Rolling: Contact guard assistance  Supine to Sit: Contact guard assistance  Sit to Stand: Minimum assistance;Assist x2  Scooting: Contact guard assistance       Most Recent Physical Functioning:   Gross Assessment:  AROM: Within functional limits (B UE)  Strength: Within functional limits (B UE- through observed activity.)               Posture:     Balance:  Sitting: Intact  Standing: Impaired  Standing - Static: Constant support  Standing - Dynamic : Fair (walker ) Bed Mobility:  Rolling: Contact guard assistance  Supine to Sit: Contact guard assistance  Scooting: Contact guard assistance  Wheelchair Mobility:     Transfers:  Sit to Stand: Minimum assistance;Assist x2                Patient Vitals for the past 6 hrs:   BP BP Patient Position SpO2 Pulse   18 0741 150/87 At rest 99 % 96       Mental Status  Neurologic State: Alert  Orientation Level: Oriented X4  Cognition: Follows commands  Perception: Appears intact  Perseveration: No perseveration noted  Safety/Judgement: Decreased awareness of need for assistance, Fall prevention, Home safety                          Physical Skills Involved:  1. Range of Motion  2. Balance  3. Strength  4. Activity Tolerance  5. Sensation  6. Pain (acute)  7. Edema Cognitive Skills Affected (resulting in the inability to perform in a timely and safe manner):  1. [facility] Psychosocial Skills Affected:  1. Habits/Routines  2. Social Interaction  3. Self-Awareness   Number of elements that affect the Plan of Care: 5+:  HIGH COMPLEXITY   CLINICAL DECISION MAKIN \[facility]\"" [address] [address] AM-PAC 6 Clicks   Daily Activity Inpatient Short Form  How much help from another person does the patient currently need. .. Total A Lot A Little None   1. Putting on and taking off regular lower body clothing? [] 1   [x] 2   [] 3   [] 4   2. Bathing (including washing, rinsing, drying)? [] 1   [x] 2   [] 3   [] 4   3.   Toileting, which includes using toilet, bedpan or urinal?   [] 1   [x] 2   [] 3   [] 4   4. Putting on and taking off regular upper body clothing? [] 1   [] 2   [x] 3   [] 4   5. Taking care of personal grooming such as brushing teeth? [] 1   [] 2   [x] 3   [] 4   6. Eating meals? [] 1   [] 2   [x] 3   [] 4   © 2007, Trustees of [address] [place] [address] [address] [address], under license to [employer]. All rights reserved      Score:  Initial: 15 Most Recent: X (Date: -- )    Interpretation of Tool:  Represents activities that are increasingly more difficult (i.e. Bed mobility, Transfers, Gait). Score 24 23 22-20 19-15 14-10 9-7 6     Modifier CH CI CJ CK CL CM CN      ? Self Care:     - CURRENT STATUS: CK - 40%-59% impaired, limited or restricted    - GOAL STATUS: CJ - 20%-39% impaired, limited or restricted    - D/C STATUS:  ---------------To be determined---------------  Payor: BLUE CROSS / [place]: [place] OF [address] / Product Type: PPO /      Medical Necessity:     · Patient demonstrates excellent rehab potential due to higher previous functional level. Reason for Services/Other Comments:  · Patient continues to require skilled intervention due to decreased independence with ADL/functional transfers. Use of outcome tool(s) and clinical judgement create a POC that gives a: LOW COMPLEXITY         TREATMENT:   (In addition to Assessment/Re-Assessment sessions the following treatments were rendered)     Pre-treatment Symptoms/Complaints:    Pain: Initial:   Pain Intensity 1: 4  Pain Location 1: Leg  Pain Orientation 1: Left  Pain Intervention(s) 1: Repositioned  Post Session:  3/10     Assessment/Reassessment only, no treatment provided today    Braces/Orthotics/Lines/Etc:   · IV  · O2 Device: Room air  Treatment/Session Assessment:    · Response to Treatment:  Evaluation only.    · Interdisciplinary Collaboration:   o Physical Therapist  o Occupational Therapist  o Registered Nurse  o Physician  · After treatment position/precautions:   o Working with PT   · Compliance with Program/Exercises: Will assess as treatment progresses. · Recommendations/Intent for next treatment session: \"Next visit will focus on advancements to more challenging activities and reduction in assistance provided\".   Total Treatment Duration:  OT Patient Time In/Time Out  Time In: 1014  Time Out: [address],

## 2018-02-23 NOTE — PROGRESS NOTES
Pharmacokinetic Consult to Pharmacist    Mahnaz Hook is a 57 y.o. male being treated for MRSA bacteremia and bone and joint infection. Height: 5' 8\" (172.7 cm)  Weight: 113.4 kg (250 lb)  Lab Results   Component Value Date/Time    BUN 13 02/22/2018 06:43 AM    Creatinine 1.04 02/22/2018 06:43 AM    WBC 13.0 (H) 02/22/2018 06:43 AM      Estimated Creatinine Clearance: 87.7 mL/min (based on Cr of 1.04). Lab Results   Component Value Date/Time    Vancomycin,trough 26.0 (HH) 02/22/2018 05:22 PM       Goal trough is 15-20. Trough elevated to 26.0. Dose held this evening. Decreased to 1g q8h with next dose at 0000 2/23  Will continue to follow patient. Thank you,  Matthew Butler, Pharm. D.   Clinical Pharmacist  [phone]

## 2018-02-24 LAB
BACTERIA SPEC CULT: NORMAL
BACTERIA SPEC CULT: NORMAL
CRP SERPL-MCNC: 11 MG/DL (ref 0–0.9)
ERYTHROCYTE [DISTWIDTH] IN BLOOD BY AUTOMATED COUNT: 18.6 % (ref 11.9–14.6)
ERYTHROCYTE [SEDIMENTATION RATE] IN BLOOD: 37 MM/HR (ref 0–20)
HCT VFR BLD AUTO: 24.7 % (ref 41.1–50.3)
HGB BLD-MCNC: 7.6 G/DL (ref 13.6–17.2)
MCH RBC QN AUTO: 25.5 PG (ref 26.1–32.9)
MCHC RBC AUTO-ENTMCNC: 30.8 G/DL (ref 31.4–35)
MCV RBC AUTO: 82.9 FL (ref 79.6–97.8)
PLATELET # BLD AUTO: 672 K/UL (ref 150–450)
PMV BLD AUTO: 9.3 FL (ref 10.8–14.1)
RBC # BLD AUTO: 2.98 M/UL (ref 4.23–5.67)
SERVICE CMNT-IMP: NORMAL
SERVICE CMNT-IMP: NORMAL
VANCOMYCIN TROUGH SERPL-MCNC: 17.1 UG/ML (ref 5–20)
WBC # BLD AUTO: 13.4 K/UL (ref 4.3–11.1)

## 2018-02-24 PROCEDURE — 65270000029 HC RM PRIVATE

## 2018-02-24 PROCEDURE — 86140 C-REACTIVE PROTEIN: CPT | Performed by: ORTHOPAEDIC SURGERY

## 2018-02-24 PROCEDURE — 74011250636 HC RX REV CODE- 250/636: Performed by: FAMILY MEDICINE

## 2018-02-24 PROCEDURE — 74011250636 HC RX REV CODE- 250/636: Performed by: NURSE PRACTITIONER

## 2018-02-24 PROCEDURE — 74011250636 HC RX REV CODE- 250/636: Performed by: INTERNAL MEDICINE

## 2018-02-24 PROCEDURE — 85652 RBC SED RATE AUTOMATED: CPT | Performed by: ORTHOPAEDIC SURGERY

## 2018-02-24 PROCEDURE — 85027 COMPLETE CBC AUTOMATED: CPT | Performed by: INTERNAL MEDICINE

## 2018-02-24 PROCEDURE — 99232 SBSQ HOSP IP/OBS MODERATE 35: CPT | Performed by: PHYSICAL MEDICINE & REHABILITATION

## 2018-02-24 PROCEDURE — 74011250637 HC RX REV CODE- 250/637: Performed by: FAMILY MEDICINE

## 2018-02-24 RX ORDER — DOCUSATE SODIUM 100 MG/1
100 CAPSULE, LIQUID FILLED ORAL 2 TIMES DAILY
Status: DISCONTINUED | OUTPATIENT
Start: 2018-02-24 | End: 2018-03-07 | Stop reason: HOSPADM

## 2018-02-24 RX ADMIN — Medication 300 UNITS: at 05:28

## 2018-02-24 RX ADMIN — HEPARIN SODIUM 5000 UNITS: 5000 INJECTION, SOLUTION INTRAVENOUS; SUBCUTANEOUS at 00:03

## 2018-02-24 RX ADMIN — Medication 300 UNITS: at 21:11

## 2018-02-24 RX ADMIN — VANCOMYCIN HYDROCHLORIDE 1000 MG: 1 INJECTION, POWDER, LYOPHILIZED, FOR SOLUTION INTRAVENOUS at 15:55

## 2018-02-24 RX ADMIN — Medication 10 ML: at 14:00

## 2018-02-24 RX ADMIN — TRAMADOL HYDROCHLORIDE 50 MG: 50 TABLET, FILM COATED ORAL at 00:09

## 2018-02-24 RX ADMIN — OXYCODONE HYDROCHLORIDE AND ACETAMINOPHEN 2 TABLET: 5; 325 TABLET ORAL at 14:55

## 2018-02-24 RX ADMIN — HEPARIN SODIUM 5000 UNITS: 5000 INJECTION, SOLUTION INTRAVENOUS; SUBCUTANEOUS at 16:05

## 2018-02-24 RX ADMIN — OXYCODONE HYDROCHLORIDE AND ACETAMINOPHEN 2 TABLET: 5; 325 TABLET ORAL at 21:10

## 2018-02-24 RX ADMIN — Medication 10 ML: at 21:11

## 2018-02-24 RX ADMIN — VANCOMYCIN HYDROCHLORIDE 1000 MG: 1 INJECTION, POWDER, LYOPHILIZED, FOR SOLUTION INTRAVENOUS at 00:03

## 2018-02-24 RX ADMIN — Medication 10 ML: at 05:28

## 2018-02-24 RX ADMIN — TRAMADOL HYDROCHLORIDE 50 MG: 50 TABLET, FILM COATED ORAL at 05:28

## 2018-02-24 RX ADMIN — Medication 300 UNITS: at 14:55

## 2018-02-24 RX ADMIN — HEPARIN SODIUM 5000 UNITS: 5000 INJECTION, SOLUTION INTRAVENOUS; SUBCUTANEOUS at 08:31

## 2018-02-24 RX ADMIN — OXYCODONE HYDROCHLORIDE AND ACETAMINOPHEN 2 TABLET: 5; 325 TABLET ORAL at 03:10

## 2018-02-24 RX ADMIN — VANCOMYCIN HYDROCHLORIDE 1000 MG: 1 INJECTION, POWDER, LYOPHILIZED, FOR SOLUTION INTRAVENOUS at 08:44

## 2018-02-24 RX ADMIN — OXYCODONE HYDROCHLORIDE AND ACETAMINOPHEN 2 TABLET: 5; 325 TABLET ORAL at 08:35

## 2018-02-24 NOTE — PROGRESS NOTES
Hospitalist Progress Note    2018  Admit Date: 2018  9:12 AM   NAME: Brea Pino   :  1955   DOS:              18  MRN:  [id number]   Attending: Samuel Rubio MD  PCP:  None  Treatment Team: Attending Provider: Yumiko Denson. Bautista Reyna MD; Physician: Nancy Fonseca MD; Consulting Provider: Magdy Betancourt MD; Consulting Provider: Yung Magallanes MD; Care Manager: Zohra Borja RN; Consulting Provider: April Carey MD; Charge Nurse: Diego Kaminski    Full Code     SUBJECTIVE:   As previously documented: \" Mr. Callahan Daniel a 57 y. o. male who has no significant PMHx who was admitted on 18 for left leg non purulent cellulitis. Of note, he lives in [place] and came to visit family members to [place], [place] on 18. He denied falls, prior knee or leg surgery. Duplex was was unremarkable for DVT. He was started on vancomycin. Has persistent Blood cultures positive for MRSA bacteremia. ECHO negative for vegetations. Due to poor improvement of his thigh and leg edema and pain he underwent an MRI of his leg showing multiloculated abscesses and findings of septic knee. He underwent I&D and knee lavage on 18 by general surgery. Orthopedics performed knee aspiration. So far, all BC and wound aspirate and cultures are positive for staph aureus. ID consulted. Plan for outpatient antibiotic completion at SNF/Rehab and wound care once Blood cultures are negative and he gets a PICC line. Patient still has persistent leukocytosis. He is being transferred to [facility] for further surgical intervention by Orthopedic group- Dr. Sofie Huizar".           18   Mr. Brea Pino  Denies any new concerns. Afebrile. Denies SOb, CP or abdominal pain. Still with low grade fevers. On IV Vancomycin with EOT 3/19/18 . Pending disposition            10+ ROS reviewed and negative except for positive in HPI.    No Known Allergies  Current Facility-Administered Medications   Medication Dose Route Frequency    lip protectant (BLISTEX) ointment   Topical PRN    sodium chloride (NS) flush 10 mL  10 mL InterCATHeter Q8H    heparin (porcine) pf 300 Units  300 Units InterCATHeter Q8H    sodium chloride (NS) flush 10 mL  10 mL InterCATHeter PRN    heparin (porcine) pf 300 Units  300 Units InterCATHeter PRN    vancomycin (VANCOCIN) 1,000 mg in 0.9% sodium chloride (MBP/ADV) 250 mL  1 g IntraVENous Q8H    acetaminophen (TYLENOL) tablet 650 mg  650 mg Oral Q6H PRN    heparin (porcine) injection 5,000 Units  5,000 Units SubCUTAneous Q8H    hydrALAZINE (APRESOLINE) 20 mg/mL injection 20 mg  20 mg IntraVENous Q6H PRN    naloxone (NARCAN) injection 0.4 mg  0.4 mg IntraVENous EVERY 2 MINUTES AS NEEDED    oxyCODONE-acetaminophen (PERCOCET) 5-325 mg per tablet 1 Tab  1 Tab Oral Q4H PRN    oxyCODONE-acetaminophen (PERCOCET) 5-325 mg per tablet 2 Tab  2 Tab Oral Q4H PRN    traMADol (ULTRAM) tablet 50 mg  50 mg Oral Q6H PRN         Immunization History   Administered Date(s) Administered    TB Skin Test (PPD) Intradermal 2018     Objective:     Patient Vitals for the past 24 hrs:   Temp Pulse Resp BP SpO2   18 1116 98.6 °F (37 °C) 95 18 (!) 143/91 99 %   18 0711 99 °F (37.2 °C) (!) 101 18 137/80 95 %   18 0526 99.1 °F (37.3 °C) (!) 103 18 126/77 93 %   18 0054 100 °F (37.8 °C) (!) 105 17 144/76 95 %   18 2145 100 °F (37.8 °C) (!) 107 18 137/76 94 %   18 1545 99.4 °F (37.4 °C) 97 19 147/81 95 %     Temp (24hrs), Av.4 °F (37.4 °C), Min:98.6 °F (37 °C), Max:100 °F (37.8 °C)    Oxygen Therapy  O2 Sat (%): 99 % (18 1116)  Pulse via Oximetry: 111 beats per minute (18 1700)  O2 Device: Room air (18 0711)  O2 Flow Rate (L/min): 3 l/min (18 1557)  Oxygen Therapy  O2 Sat (%): 99 % (18 1116)  Pulse via Oximetry: 111 beats per minute (18 1700)  O2 Device: Room air (18 0711)  O2 Flow Rate (L/min): 3 l/min (18 1557)    Physical Exam:  General:         Alert, cooperative, no acute  Distress. Low grade fevers   HEENT:               NCAT. No obvious deformity. Nares normal. No drainage  Lungs:         CTABL. No wheezing/rhonchi/rales  Cardiovascular:   RRR. No m/r/g. No pedal edema b/l. +2 PT/DT pulses b/l. Abdomen:       S/nt/nd. Bowel sounds normal. .   Skin:         No rashes or lesions. Not Jaundiced  Neurologic:    AAOx3. Leonardo Greener No gross focal deficit. Moves all extremities. Psychiatric:         Good mood. Normal affect. Denies any SI/HI. DIAGNOSTIC STUDIES      Data Review:   Recent Results (from the past 24 hour(s))   VANCOMYCIN, TROUGH    Collection Time: 02/23/18 11:20 PM   Result Value Ref Range    Vancomycin,trough 17.1 5 - 20 ug/mL   CBC W/O DIFF    Collection Time: 02/24/18  6:02 AM   Result Value Ref Range    WBC 13.4 (H) 4.3 - 11.1 K/uL    RBC 2.98 (L) 4.23 - 5.67 M/uL    HGB 7.6 (L) 13.6 - 17.2 g/dL    HCT 24.7 (L) 41.1 - 50.3 %    MCV 82.9 79.6 - 97.8 FL    MCH 25.5 (L) 26.1 - 32.9 PG    MCHC 30.8 (L) 31.4 - 35.0 g/dL    RDW 18.6 (H) 11.9 - 14.6 %    PLATELET 720 (H) 537 - 450 K/uL    MPV 9.3 (L) 10.8 - 14.1 FL       All Micro Results     None          Imaging /Procedures /Studies:    MRI LLE 2/16/18 FINDINGS:   There are multiple fluid collections within the deep muscles of the  left leg particularly along the anterior and lateral thigh. One of these  components measures approximately 3.0 x 6.5 x 4.0 cm. There are smaller  components below the knee medially extending to the upper to mid calf. There is  a large joint effusion with peripheral enhancement. There is enhancement about  the joint space. There is marrow edema associated with the medial compartment  which is relatively symmetric to the contralateral side, more likely to be  degenerative rather than osteomyelitis. There is extensive muscular edema.  There  is a large joint effusion on the right as well although postcontrast imaging of  this knee was not performed. IMPRESSION:  1. Multiloculated fluid collections within the left lower extremity most  compatible with abscesses. A large joint effusion is present with enhancement as  well as enhancement within the joint space therefore concerning for septic  arthritis. There are no definite findings of osteomyelitis. 2. Large right joint effusion. Echocardiogram 2/14/18: SUMMARY:  -  Left ventricle: Systolic function was normal. Ejection fraction was  estimated in the range of 55 % to 60 %. There were no regional wall motion  abnormalities. There was mild concentric hypertrophy. The E/e' ratio was 8.3.  -  Pericardium: There was no pericardial effusion. Labs and Studies from previous 24 hours have been personally reviewed by myself [facility] Problems as of 2/24/2018  Date Reviewed: 2/20/2018          Codes Class Noted - Resolved POA    * (Principal)Septic arthritis of knee, left ([facility] [address] ICD-10-CM: M00.9  ICD-9-CM: 711.06  2/21/2018 - Present Yes        MRSA bacteremia ICD-10-CM: R78.81  ICD-9-CM: 790.7, 041.12  2/21/2018 - Present Yes        Leukocytosis ICD-10-CM: D72.829  ICD-9-CM: 288.60  2/13/2018 - Present Yes        Hypoalbuminemia ICD-10-CM: E88.09  ICD-9-CM: 273.8  2/13/2018 - Present Yes        Anemia ICD-10-CM: D64.9  ICD-9-CM: 285.9  2/13/2018 - Present Yes        Left leg cellulitis ICD-10-CM: L03.116  ICD-9-CM: 682.6  2/13/2018 - Present Yes        Abnormal transaminases ICD-10-CM: R74.8  ICD-9-CM: 790.4  2/13/2018 - Present Yes              Plan:  L knee septic arthritis with MRSA bacteremia and leg cellulitis:  - aspirated on 2/16/18 and s/p I&D 2/21/18  - On IV vancomycin with EOT:3/19/18  - BC 2/2 from 2/19 negative   -TTE and MANJINDER  w/o vegetations.       Iron deficiency anemia:  - on Iron  supplementation  -  Hemoccult collection pending  - will need outpatient GI evaluation    Elevated liver enzymes:  - resolved - unclear etiology  - acute hepatitis panel negative Debility :  -PT/OT/PPD/IRC  - will need rehab - [facility] vs [facility] vs SNF      DVT Prophylaxis: Heparin SQ  CODE Status: Full CODE  Plan of Care Discussed with: patient.  Care team.  Medical Risk: high  Disposition: pending    Suleiman Collier MD  02/24/18

## 2018-02-24 NOTE — PROGRESS NOTES
Problem: Falls - Risk of  Goal: *Absence of Falls  Document Kinjal Fall Risk and appropriate interventions in the flowsheet. Outcome: Progressing Towards Goal  Fall Risk Interventions:  Mobility Interventions: Bed/chair exit alarm, Communicate number of staff needed for ambulation/transfer, Patient to call before getting OOB    Mentation Interventions: Adequate sleep, hydration, pain control, Bed/chair exit alarm, Door open when patient unattended, Evaluate medications/consider consulting pharmacy, Eyeglasses and hearing aids, Familiar objects from home, Family/sitter at bedside, Gait belt with transfers/ambulation, Increase mobility, More frequent rounding, Reorient patient, Room close to nurse's station, Self-releasing belt, Toileting rounds, Update white board    Medication Interventions: Assess postural VS orthostatic hypotension, Bed/chair exit alarm, Patient to call before getting OOB    Elimination Interventions: Bed/chair exit alarm, Call light in reach, Patient to call for help with toileting needs             Problem: Pain  Goal: *Control of Pain  Outcome: Progressing Towards Goal  Using meds and assessment to keep pain at a tolerable level.

## 2018-02-24 NOTE — PROGRESS NOTES
[facility] FRACTURE PROGRESS NOTE    2018  Admit Date:   2018    Post Op day: 2 Days Post-Op    Subjective:    Sagar Hutson LOOKS GOOD     PT/OT:   Gait:  Gait  Base of Support: Shift to right, Center of gravity altered, Widened  Speed/Rosalie: Shuffled, Slow  Step Length: Left shortened  Swing Pattern: Right asymmetrical  Stance: Left decreased, Right increased  Gait Abnormalities: Decreased step clearance, Shuffling gait, Step to gait  Ambulation - Level of Assistance: Contact guard assistance, Minimal assistance  Distance (ft): 6 Feet (ft)  Assistive Device: Walker, rolling  Interventions: Safety awareness training, Tactile cues, Verbal cues            Interventions: Safety awareness training, Tactile cues, Verbal cues    Vital Signs:    Patient Vitals for the past 8 hrs:   BP Temp Pulse Resp SpO2   18 0711 137/80 99 °F (37.2 °C) (!) 101 18 95 %   18 0526 126/77 99.1 °F (37.3 °C) (!) 103 18 93 %   18 0054 144/76 100 °F (37.8 °C) (!) 105 17 95 %     Temp (24hrs), Av.2 °F (37.3 °C), Min:98.1 °F (36.7 °C), Max:100 °F (37.8 °C)      Pain Control:   Pain Assessment  Pain Scale 1: Numeric (0 - 10)  Pain Intensity 1: 5  Pain Onset 1: postop  Pain Location 1: Knee  Pain Orientation 1: Left  Pain Description 1: Aching  Pain Intervention(s) 1: Medication (see MAR)    Meds:    Current Facility-Administered Medications   Medication Dose Route Frequency    lip protectant (BLISTEX) ointment   Topical PRN    sodium chloride (NS) flush 10 mL  10 mL InterCATHeter Q8H    heparin (porcine) pf 300 Units  300 Units InterCATHeter Q8H    sodium chloride (NS) flush 10 mL  10 mL InterCATHeter PRN    heparin (porcine) pf 300 Units  300 Units InterCATHeter PRN    vancomycin (VANCOCIN) 1,000 mg in 0.9% sodium chloride (MBP/ADV) 250 mL  1 g IntraVENous Q8H    acetaminophen (TYLENOL) tablet 650 mg  650 mg Oral Q6H PRN    heparin (porcine) injection 5,000 Units  5,000 Units SubCUTAneous Q8H    hydrALAZINE (APRESOLINE) 20 mg/mL injection 20 mg  20 mg IntraVENous Q6H PRN    HYDROmorphone (PF) (DILAUDID) injection 0.5 mg  0.5 mg IntraVENous Q6H PRN    HYDROmorphone (PF) (DILAUDID) injection 1 mg  1 mg IntraVENous WOUND PRN    naloxone (NARCAN) injection 0.4 mg  0.4 mg IntraVENous EVERY 2 MINUTES AS NEEDED    oxyCODONE-acetaminophen (PERCOCET) 5-325 mg per tablet 1 Tab  1 Tab Oral Q4H PRN    oxyCODONE-acetaminophen (PERCOCET) 5-325 mg per tablet 2 Tab  2 Tab Oral Q4H PRN    traMADol (ULTRAM) tablet 50 mg  50 mg Oral Q6H PRN       LAB:    Recent Labs      02/23/18   0634   HCT  25.1*   HGB  7.7*       24 Hour Assessment Issues:    Oriented    Discharge Planning: HOME    Transfuse PRBC's:      Assessment & Physician's Comment:  Dressing is clean, dry, and intact  Neurovascular checks within normal limits   DRAIN PULLED  NO ERYTHEMA    Principal Problem:    Septic arthritis of knee, left ([address] (2/21/2018)    Active Problems:    Leukocytosis (2/13/2018)      Hypoalbuminemia (2/13/2018)      Anemia (2/13/2018)      Left leg cellulitis (2/13/2018)      Abnormal transaminases (2/13/2018)      MRSA bacteremia (2/21/2018)        Plan:   Daniel Diane MD

## 2018-02-24 NOTE — PROGRESS NOTES
Orthopedic Joint Progress Note    2018  Admit Date: 2018  Admit Diagnosis: Open wound of left knee, initial encounter [S81.002A]    3 Days Post-Op    Subjective: no complaints sitting up in chair     Home Dessert     Review of Systems: Pertinent items are noted in HPI. Objective:     PT/OT:     PATIENT MOBILITY    Bed Mobility  Rolling: Contact guard assistance  Supine to Sit: Contact guard assistance  Scooting: Contact guard assistance  Transfers  Sit to Stand: Minimum assistance  Stand to Sit: Minimum assistance  Bed to Chair: Contact guard assistance, Minimum assistance      Gait  Base of Support: Shift to right, Center of gravity altered, Widened  Speed/Rosalie: Shuffled, Slow  Step Length: Left shortened  Swing Pattern: Right asymmetrical  Stance: Left decreased, Right increased  Gait Abnormalities: Decreased step clearance, Shuffling gait, Step to gait  Ambulation - Level of Assistance: Contact guard assistance, Minimal assistance  Distance (ft): 6 Feet (ft)  Assistive Device: Walker, rolling  Interventions: Safety awareness training, Tactile cues, Verbal cues            Vital Signs:    Blood pressure 137/80, pulse (!) 101, temperature 99 °F (37.2 °C), resp. rate 18, height 5' 8\" (1.727 m), weight 113.4 kg (250 lb), SpO2 95 %.   Temp (24hrs), Av.4 °F (37.4 °C), Min:99 °F (37.2 °C), Max:100 °F (37.8 °C)      Pain Control:   Pain Assessment  Pain Scale 1: Visual  Pain Intensity 1: 0  Pain Onset 1: post op  Pain Location 1: Knee  Pain Orientation 1: Left  Pain Description 1: Pressure  Pain Intervention(s) 1: Relaxation technique, Medication (see MAR)    Meds:  Current Facility-Administered Medications   Medication Dose Route Frequency    lip protectant (BLISTEX) ointment   Topical PRN    sodium chloride (NS) flush 10 mL  10 mL InterCATHeter Q8H    heparin (porcine) pf 300 Units  300 Units InterCATHeter Q8H    sodium chloride (NS) flush 10 mL  10 mL InterCATHeter PRN    heparin (porcine) pf 300 Units  300 Units InterCATHeter PRN    vancomycin (VANCOCIN) 1,000 mg in 0.9% sodium chloride (MBP/ADV) 250 mL  1 g IntraVENous Q8H    acetaminophen (TYLENOL) tablet 650 mg  650 mg Oral Q6H PRN    heparin (porcine) injection 5,000 Units  5,000 Units SubCUTAneous Q8H    hydrALAZINE (APRESOLINE) 20 mg/mL injection 20 mg  20 mg IntraVENous Q6H PRN    naloxone (NARCAN) injection 0.4 mg  0.4 mg IntraVENous EVERY 2 MINUTES AS NEEDED    oxyCODONE-acetaminophen (PERCOCET) 5-325 mg per tablet 1 Tab  1 Tab Oral Q4H PRN    oxyCODONE-acetaminophen (PERCOCET) 5-325 mg per tablet 2 Tab  2 Tab Oral Q4H PRN    traMADol (ULTRAM) tablet 50 mg  50 mg Oral Q6H PRN        LAB:    Lab Results   Component Value Date/Time    INR 1.2 02/14/2018 07:07 AM     Lab Results   Component Value Date/Time    HGB 7.6 (L) 02/24/2018 06:02 AM    HGB 7.7 (L) 02/23/2018 06:34 AM    HGB 7.9 (L) 02/22/2018 06:43 AM       Wound Leg Left (Active)   DRESSING STATUS Clean, dry, and intact 2/24/2018  8:28 AM   DRESSING TYPE Transparent film;4 x 4 2/24/2018  8:28 AM   Incision site well approximated?  No 2/19/2018  1:53 PM   Non-Pressure Injury Full thickness (subcut/muscle) 2/19/2018  1:53 PM   Wound Length (cm) 19.2 cm 2/19/2018  1:53 PM   Wound Width (cm) 7 cm 2/19/2018  1:53 PM   Wound Depth (cm) 5 2/19/2018  1:53 PM   Wound Surface area (cm^2) 134.4 cm^2 2/19/2018  1:53 PM   Condition of Base Pink;Muscle exposed 2/19/2018  1:53 PM   Condition of Edges Open 2/19/2018  1:53 PM   Tissue Type Percent Pink 100 2/19/2018  1:53 PM   Direction of Tunnels 9    oclock 2/19/2018  1:53 PM   Depth of Tunnel/Sinus (cm) 5 cm 2/19/2018  1:53 PM   Drainage Amount  None 2/24/2018  8:28 AM   Drainage Color Serosanguinous 2/19/2018  1:53 PM   Wound Odor None 2/19/2018  1:53 PM   Periwound Skin Condition Intact 2/19/2018  1:53 PM   Cleansing and Cleansing Agents  Normal saline 2/19/2018  1:53 PM   Dressing Type Applied Negative pressure wound therapy 2/19/2018  1:53 PM   Procedure Tolerated Well 2/19/2018  1:53 PM   Number of days:7       Wound Leg Left (Active)   DRESSING STATUS Clean, dry, and intact 2/24/2018  8:28 AM   DRESSING TYPE 4 x 4;Transparent film 2/24/2018  8:28 AM   Incision site well approximated?  Yes 2/23/2018  9:03 AM   Drainage Amount  None 2/24/2018  8:28 AM   Drainage Color Purulent 2/23/2018  9:03 AM   Wound Odor None 2/23/2018  9:03 AM   Periwound Skin Condition Intact 2/23/2018  9:03 AM   Dressing Type Applied 4 x 4;Transparent film 2/23/2018  9:03 AM   Procedure Tolerated Well 2/23/2018  9:03 AM   Number of days:3             Physical Exam:  No significant changes    Assessment:      Principal Problem:    Septic arthritis of knee, left (H[facility]) (2/21/2018)    Active Problems:    Leukocytosis (2/13/2018)      Hypoalbuminemia (2/13/2018)      Anemia (2/13/2018)      Left leg cellulitis (2/13/2018)      Abnormal transaminases (2/13/2018)      MRSA bacteremia (2/21/2018)         Plan:     Continue PT/OT/Rehab  observe  Continue care  Transfer to rehab when stable    Patient Expects to be Discharged to[de-identified] Private residence     Signed By: Justino Abraham MD

## 2018-02-24 NOTE — PROGRESS NOTES
Pharmacokinetic Consult to Pharmacist    Wyonia Apley is a 57 y.o. male being treated for MRSA bacteremia with vancomycin. Height: 5' 8\" (172.7 cm)  Weight: 113.4 kg (250 lb)  Lab Results   Component Value Date/Time    BUN 12 02/23/2018 06:34 AM    Creatinine 1.05 02/23/2018 06:34 AM    WBC 13.4 (H) 02/24/2018 06:02 AM      Estimated Creatinine Clearance: 86.9 mL/min (based on Cr of 1.05). Lab Results   Component Value Date/Time    Vancomycin,trough 17.1 02/23/2018 11:20 PM       Day 12 of vancomycin. Goal trough is 15-20. Will continue vancomycin 1000mg q 8 hours. Will continue to follow patient.       Thank you,  Chloe Torres, PharmD

## 2018-02-24 NOTE — PROGRESS NOTES
Problem: Falls - Risk of  Goal: *Absence of Falls  Document Kinjal Fall Risk and appropriate interventions in the flowsheet.    Outcome: Progressing Towards Goal  Fall Risk Interventions:  Mobility Interventions: Bed/chair exit alarm, Communicate number of staff needed for ambulation/transfer, OT consult for ADLs, Patient to call before getting OOB, PT Consult for mobility concerns, PT Consult for assist device competence, Strengthening exercises (ROM-active/passive), Utilize walker, cane, or other assitive device, Utilize gait belt for transfers/ambulation    Mentation Interventions: Adequate sleep, hydration, pain control, Bed/chair exit alarm, Door open when patient unattended, Evaluate medications/consider consulting pharmacy, Eyeglasses and hearing aids, Familiar objects from home, Family/sitter at bedside, Gait belt with transfers/ambulation, Increase mobility, More frequent rounding, Reorient patient, Room close to nurse's station, Self-releasing belt, Toileting rounds, Update white board    Medication Interventions: Assess postural VS orthostatic hypotension, Bed/chair exit alarm, Evaluate medications/consider consulting pharmacy, Patient to call before getting OOB, Teach patient to arise slowly, Utilize gait belt for transfers/ambulation    Elimination Interventions: Bed/chair exit alarm, Call light in reach, Elevated toilet seat, Toilet paper/wipes in reach, Patient to call for help with toileting needs, Toileting schedule/hourly rounds

## 2018-02-24 NOTE — PROGRESS NOTES
PM&R Consult Progress Note      Patient: Herman Mats  Admit Date: 2/21/2018  Admit Diagnosis: Open wound of left knee, initial encounter [S81.002A]  Recommendations: Continue Acute Rehab Program, Coordination of rehab/medical care, Counseling of PM & R care issues management, Ragini Ibarra [id number]  -follow up progress; not progressing as predicted. He is limited by pain, limited ROM and proximal weakness in the LLE, debility as well as anemia/ Hgb 7.6, WBC elevated, pt tachycardic and still running low grade fevers. For these reasons, I feel that pt would best be managed in an acute inpt rehab setting for monitoring of these post op issues and risk of further infection. He requires 2 disciplines of therapy, PT and OT, and will benefit from more aggressive rehab than a SNF can provide. Will need insurance precert, will begin this on Monday  History/Subjective/Complaint:     Records reviewed. Pain 1  Pain Scale 1: Numeric (0 - 10) (02/24/18 1455)  Pain Intensity 1: 8 (02/24/18 1455)  Patient Stated Pain Goal: 0 (02/24/18 1455)  Pain Reassessment 1: Yes (02/24/18 1455)  Pain Onset 1: postop (02/24/18 1455)  Pain Location 1: Knee (02/24/18 1455)  Pain Orientation 1: Left (02/24/18 1455)  Pain Description 1: Pressure; Aching (02/24/18 1455)  Pain Intervention(s) 1: Medication (see MAR) (02/24/18 1455)     Objective:     Vitals:  Patient Vitals for the past 8 hrs:   BP Temp Pulse Resp SpO2   02/24/18 1508 112/65 100 °F (37.8 °C) (!) 113 19 98 %   02/24/18 1116 (!) 143/91 98.6 °F (37 °C) 95 18 99 %      Intake and Output:  02/22 1901 - 02/24 0700  In: -   Out: 850 [Urine:850]    No Known Allergies  Current Facility-Administered Medications   Medication Dose Route Frequency    lip protectant (BLISTEX) ointment   Topical PRN    sodium chloride (NS) flush 10 mL  10 mL InterCATHeter Q8H    heparin (porcine) pf 300 Units  300 Units InterCATHeter Q8H    sodium chloride (NS) flush 10 mL  10 mL InterCATHeter PRN    heparin (porcine) pf 300 Units  300 Units InterCATHeter PRN    vancomycin (VANCOCIN) 1,000 mg in 0.9% sodium chloride (MBP/ADV) 250 mL  1 g IntraVENous Q8H    acetaminophen (TYLENOL) tablet 650 mg  650 mg Oral Q6H PRN    heparin (porcine) injection 5,000 Units  5,000 Units SubCUTAneous Q8H    hydrALAZINE (APRESOLINE) 20 mg/mL injection 20 mg  20 mg IntraVENous Q6H PRN    naloxone (NARCAN) injection 0.4 mg  0.4 mg IntraVENous EVERY 2 MINUTES AS NEEDED    oxyCODONE-acetaminophen (PERCOCET) 5-325 mg per tablet 1 Tab  1 Tab Oral Q4H PRN    oxyCODONE-acetaminophen (PERCOCET) 5-325 mg per tablet 2 Tab  2 Tab Oral Q4H PRN    traMADol (ULTRAM) tablet 50 mg  50 mg Oral Q6H PRN       Incision(s)/Wound(s): Wound Leg Left (Active)   DRESSING STATUS Clean, dry, and intact 2/24/2018  8:28 AM   DRESSING TYPE Transparent film;4 x 4 2/24/2018  8:28 AM   Incision site well approximated?  No 2/19/2018  1:53 PM   Non-Pressure Injury Full thickness (subcut/muscle) 2/19/2018  1:53 PM   Wound Length (cm) 19.2 cm 2/19/2018  1:53 PM   Wound Width (cm) 7 cm 2/19/2018  1:53 PM   Wound Depth (cm) 5 2/19/2018  1:53 PM   Wound Surface area (cm^2) 134.4 cm^2 2/19/2018  1:53 PM   Condition of Base Pink;Muscle exposed 2/19/2018  1:53 PM   Condition of Edges Open 2/19/2018  1:53 PM   Tissue Type Percent Pink 100 2/19/2018  1:53 PM   Direction of Tunnels 9    oclock 2/19/2018  1:53 PM   Depth of Tunnel/Sinus (cm) 5 cm 2/19/2018  1:53 PM   Drainage Amount  None 2/24/2018  8:28 AM   Drainage Color Serosanguinous 2/19/2018  1:53 PM   Wound Odor None 2/19/2018  1:53 PM   Periwound Skin Condition Intact 2/19/2018  1:53 PM   Cleansing and Cleansing Agents  Normal saline 2/19/2018  1:53 PM   Dressing Type Applied Negative pressure wound therapy 2/19/2018  1:53 PM   Procedure Tolerated Well 2/19/2018  1:53 PM   Number of days:7       Wound Leg Left (Active)   DRESSING STATUS Clean, dry, and intact 2/24/2018  8:28 AM   DRESSING TYPE 4 x 4;Transparent film 2/24/2018  8:28 AM   Incision site well approximated? Yes 2/23/2018  9:03 AM   Drainage Amount  None 2/24/2018  8:28 AM   Drainage Color Purulent 2/23/2018  9:03 AM   Wound Odor None 2/23/2018  9:03 AM   Periwound Skin Condition Intact 2/23/2018  9:03 AM   Dressing Type Applied 4 x 4;Transparent film 2/23/2018  9:03 AM   Procedure Tolerated Well 2/23/2018  9:03 AM   Number of days:3              Functional Assessment:  Gross Assessment  AROM: Generally decreased, functional (L LE) (02/23/18 1031)  Strength: Generally decreased, functional (L LE) (02/23/18 1031)  Coordination: Within functional limits (02/23/18 1031)  Sensation: Intact (Light touch B LE) (02/23/18 1031)     Gait  Base of Support: Shift to right;Center of gravity altered; Widened (02/23/18 1031)  Speed/Rosalie: Shuffled; Slow (02/23/18 1031)  Step Length: Left shortened (02/23/18 1031)  Swing Pattern: Right asymmetrical (02/23/18 1031)  Stance: Left decreased;Right increased (02/23/18 1031)  Gait Abnormalities: Decreased step clearance;Shuffling gait; Step to gait (02/23/18 1031)  Ambulation - Level of Assistance: Contact guard assistance;Minimal assistance (02/23/18 1031)  Distance (ft): 6 Feet (ft) (02/23/18 1031)  Assistive Device: Walker, rolling (02/23/18 1031)  Interventions: Safety awareness training; Tactile cues; Verbal cues (02/23/18 1031)     Bed Mobility  Rolling: Contact guard assistance (02/23/18 1000)  Supine to Sit: Contact guard assistance (02/23/18 1031)  Scooting: Contact guard assistance (02/23/18 1031)     Balance  Sitting: Intact (02/23/18 1031)  Standing: Impaired (02/23/18 1031)  Standing - Static: Fair (02/23/18 1031)  Standing - Dynamic : Fair (02/23/18 1031)                       Bed/Mat Mobility  Rolling: Contact guard assistance (02/23/18 1000)  Supine to Sit: Contact guard assistance (02/23/18 1031)  Sit to Stand: Minimum assistance (02/23/18 1031)  Bed to Chair: Contact guard assistance;Minimum assistance (02/23/18 7621)  Scooting: Contact guard assistance (02/23/18 1030)     Labs/Studies:  Recent Results (from the past 72 hour(s))   URIC ACID    Collection Time: 02/21/18  7:02 PM   Result Value Ref Range    Uric acid 6.2 (H) 2.6 - 6.0 MG/DL   HEPATIC FUNCTION PANEL    Collection Time: 02/22/18  6:43 AM   Result Value Ref Range    Protein, total 8.0 6.3 - 8.2 g/dL    Albumin 2.6 (L) 3.2 - 4.6 g/dL    Globulin 5.4 (H) 2.3 - 3.5 g/dL    A-G Ratio 0.5 (L) 1.2 - 3.5      Bilirubin, total 0.6 0.2 - 1.1 MG/DL    Bilirubin, direct 0.2 <0.4 MG/DL    Alk. phosphatase 139 (H) 50 - 136 U/L    AST (SGOT) 36 15 - 37 U/L    ALT (SGPT) 68 (H) 12 - 65 U/L   CBC WITH AUTOMATED DIFF    Collection Time: 02/22/18  6:43 AM   Result Value Ref Range    WBC 13.0 (H) 4.3 - 11.1 K/uL    RBC 3.12 (L) 4.23 - 5.67 M/uL    HGB 7.9 (L) 13.6 - 17.2 g/dL    HCT 25.1 (L) 41.1 - 50.3 %    MCV 80.4 79.6 - 97.8 FL    MCH 25.3 (L) 26.1 - 32.9 PG    MCHC 31.5 31.4 - 35.0 g/dL    RDW 18.2 (H) 11.9 - 14.6 %    PLATELET 767 (H) 156 - 450 K/uL    MPV 9.0 (L) 10.8 - 14.1 FL    DF AUTOMATED      NEUTROPHILS 73 43 - 78 %    LYMPHOCYTES 15 13 - 44 %    MONOCYTES 8 4.0 - 12.0 %    EOSINOPHILS 3 0.5 - 7.8 %    BASOPHILS 0 0.0 - 2.0 %    IMMATURE GRANULOCYTES 1 0.0 - 5.0 %    ABS. NEUTROPHILS 9.5 (H) 1.7 - 8.2 K/UL    ABS. LYMPHOCYTES 2.0 0.5 - 4.6 K/UL    ABS. MONOCYTES 1.0 0.1 - 1.3 K/UL    ABS. EOSINOPHILS 0.3 0.0 - 0.8 K/UL    ABS. BASOPHILS 0.0 0.0 - 0.2 K/UL    ABS. IMM.  GRANS. 0.1 0.0 - 0.5 K/UL   METABOLIC PANEL, BASIC    Collection Time: 02/22/18  6:43 AM   Result Value Ref Range    Sodium 139 136 - 145 mmol/L    Potassium 3.9 3.5 - 5.1 mmol/L    Chloride 105 98 - 107 mmol/L    CO2 26 21 - 32 mmol/L    Anion gap 8 7 - 16 mmol/L    Glucose 99 65 - 100 mg/dL    BUN 13 8 - 23 MG/DL    Creatinine 1.04 0.8 - 1.5 MG/DL    GFR est AA >60 >60 ml/min/1.73m2    GFR est non-AA >60 >60 ml/min/1.73m2    Calcium 9.8 8.3 - 10.4 MG/DL   SED RATE, AUTOMATED    Collection Time: 02/22/18  1:50 PM   Result Value Ref Range    Sed rate, automated 120 (H) 0 - 20 mm/hr   C REACTIVE PROTEIN, QT    Collection Time: 02/22/18  1:50 PM   Result Value Ref Range    C-Reactive protein 9.9 (H) 0.0 - 0.9 mg/dL   Joanne Medina    Collection Time: 02/22/18  5:22 PM   Result Value Ref Range    Vancomycin,trough 26.0 (HH) 5 - 20 ug/mL   CBC W/O DIFF    Collection Time: 02/23/18  6:34 AM   Result Value Ref Range    WBC 10.2 4.3 - 11.1 K/uL    RBC 3.09 (L) 4.23 - 5.67 M/uL    HGB 7.7 (L) 13.6 - 17.2 g/dL    HCT 25.1 (L) 41.1 - 50.3 %    MCV 81.2 79.6 - 97.8 FL    MCH 24.9 (L) 26.1 - 32.9 PG    MCHC 30.7 (L) 31.4 - 35.0 g/dL    RDW 18.2 (H) 11.9 - 14.6 %    PLATELET 745 (H) 484 - 450 K/uL    MPV 9.0 (L) 10.8 - 58.8 FL   METABOLIC PANEL, COMPREHENSIVE    Collection Time: 02/23/18  6:34 AM   Result Value Ref Range    Sodium 139 136 - 145 mmol/L    Potassium 3.7 3.5 - 5.1 mmol/L    Chloride 105 98 - 107 mmol/L    CO2 27 21 - 32 mmol/L    Anion gap 7 7 - 16 mmol/L    Glucose 99 65 - 100 mg/dL    BUN 12 8 - 23 MG/DL    Creatinine 1.05 0.8 - 1.5 MG/DL    GFR est AA >60 >60 ml/min/1.73m2    GFR est non-AA >60 >60 ml/min/1.73m2    Calcium 9.4 8.3 - 10.4 MG/DL    Bilirubin, total 0.7 0.2 - 1.1 MG/DL    ALT (SGPT) 62 12 - 65 U/L    AST (SGOT) 35 15 - 37 U/L    Alk.  phosphatase 139 (H) 50 - 136 U/L    Protein, total 8.0 6.3 - 8.2 g/dL    Albumin 2.5 (L) 3.2 - 4.6 g/dL    Globulin 5.5 (H) 2.3 - 3.5 g/dL    A-G Ratio 0.5 (L) 1.2 - 3.5     MAGNESIUM    Collection Time: 02/23/18  6:34 AM   Result Value Ref Range    Magnesium 2.1 1.8 - 2.4 mg/dL   Joanne Medina    Collection Time: 02/23/18 11:20 PM   Result Value Ref Range    Vancomycin,trough 17.1 5 - 20 ug/mL   CBC W/O DIFF    Collection Time: 02/24/18  6:02 AM   Result Value Ref Range    WBC 13.4 (H) 4.3 - 11.1 K/uL    RBC 2.98 (L) 4.23 - 5.67 M/uL    HGB 7.6 (L) 13.6 - 17.2 g/dL    HCT 24.7 (L) 41.1 - 50.3 %    MCV 82.9 79.6 - 97.8 FL    MCH 25.5 (L) 26.1 - 32.9 PG    MCHC 30.8 (L) 31.4 - 35.0 g/dL    RDW 18.6 (H) 11.9 - 14.6 %    PLATELET 522 (H) 570 - 450 K/uL    MPV 9.3 (L) 10.8 - 14.1 FL   C REACTIVE PROTEIN, QT    Collection Time: 02/24/18  2:53 PM   Result Value Ref Range    C-Reactive protein 11.0 (H) 0.0 - 0.9 mg/dL        Assessment:     Principal Problem:    Septic arthritis of knee, left (HCC) (2/21/2018)    Active Problems:    Leukocytosis (2/13/2018)      Hypoalbuminemia (2/13/2018)      Anemia (2/13/2018)      Left leg cellulitis (2/13/2018)      Abnormal transaminases (2/13/2018)      MRSA bacteremia (2/21/2018)        Plan:     Recommendations: Continue Acute Rehab Program  Coordination of rehab/medical care  Counseling of PM & R care issues management  Monitoring and management of medical conditions per plan of care/orders  Discussion with Family/Caregiver/Staff  Reviewed Therapies/Labs/Medications/Records    Signed By:  Deysi Mckeon MD     February 24, 2018

## 2018-02-25 LAB
ANION GAP SERPL CALC-SCNC: 8 MMOL/L (ref 7–16)
BASOPHILS # BLD: 0 K/UL (ref 0–0.2)
BASOPHILS NFR BLD: 0 % (ref 0–2)
BNP SERPL-MCNC: 12 PG/ML
BUN SERPL-MCNC: 14 MG/DL (ref 8–23)
CALCIUM SERPL-MCNC: 9.4 MG/DL (ref 8.3–10.4)
CHLORIDE SERPL-SCNC: 104 MMOL/L (ref 98–107)
CO2 SERPL-SCNC: 27 MMOL/L (ref 21–32)
CREAT SERPL-MCNC: 0.99 MG/DL (ref 0.8–1.5)
DIFFERENTIAL METHOD BLD: ABNORMAL
EOSINOPHIL # BLD: 0.4 K/UL (ref 0–0.8)
EOSINOPHIL NFR BLD: 3 % (ref 0.5–7.8)
ERYTHROCYTE [DISTWIDTH] IN BLOOD BY AUTOMATED COUNT: 18.8 % (ref 11.9–14.6)
GLUCOSE SERPL-MCNC: 108 MG/DL (ref 65–100)
HAPTOGLOB SERPL-MCNC: 373 MG/DL (ref 30–200)
HCT VFR BLD AUTO: 21.5 % (ref 41.1–50.3)
HCT VFR BLD AUTO: 25.5 % (ref 41.1–50.3)
HGB BLD-MCNC: 6.7 G/DL (ref 13.6–17.2)
HGB BLD-MCNC: 8 G/DL (ref 13.6–17.2)
IMM GRANULOCYTES # BLD: 0.1 K/UL (ref 0–0.5)
IMM GRANULOCYTES NFR BLD AUTO: 1 % (ref 0–5)
LYMPHOCYTES # BLD: 2.5 K/UL (ref 0.5–4.6)
LYMPHOCYTES NFR BLD: 20 % (ref 13–44)
MCH RBC QN AUTO: 25.8 PG (ref 26.1–32.9)
MCHC RBC AUTO-ENTMCNC: 31.2 G/DL (ref 31.4–35)
MCV RBC AUTO: 82.7 FL (ref 79.6–97.8)
MONOCYTES # BLD: 1.1 K/UL (ref 0.1–1.3)
MONOCYTES NFR BLD: 9 % (ref 4–12)
NEUTS SEG # BLD: 8.2 K/UL (ref 1.7–8.2)
NEUTS SEG NFR BLD: 67 % (ref 43–78)
PLATELET # BLD AUTO: 562 K/UL (ref 150–450)
PMV BLD AUTO: 8.8 FL (ref 10.8–14.1)
POTASSIUM SERPL-SCNC: 3.8 MMOL/L (ref 3.5–5.1)
RBC # BLD AUTO: 2.6 M/UL (ref 4.23–5.67)
SODIUM SERPL-SCNC: 139 MMOL/L (ref 136–145)
WBC # BLD AUTO: 12.4 K/UL (ref 4.3–11.1)

## 2018-02-25 PROCEDURE — 86900 BLOOD TYPING SEROLOGIC ABO: CPT | Performed by: HOSPITALIST

## 2018-02-25 PROCEDURE — 36415 COLL VENOUS BLD VENIPUNCTURE: CPT | Performed by: HOSPITALIST

## 2018-02-25 PROCEDURE — 83880 ASSAY OF NATRIURETIC PEPTIDE: CPT | Performed by: HOSPITALIST

## 2018-02-25 PROCEDURE — 97535 SELF CARE MNGMENT TRAINING: CPT

## 2018-02-25 PROCEDURE — 80048 BASIC METABOLIC PNL TOTAL CA: CPT | Performed by: HOSPITALIST

## 2018-02-25 PROCEDURE — 74011250636 HC RX REV CODE- 250/636: Performed by: NURSE PRACTITIONER

## 2018-02-25 PROCEDURE — 74011250636 HC RX REV CODE- 250/636: Performed by: FAMILY MEDICINE

## 2018-02-25 PROCEDURE — 74011250637 HC RX REV CODE- 250/637: Performed by: FAMILY MEDICINE

## 2018-02-25 PROCEDURE — 86923 COMPATIBILITY TEST ELECTRIC: CPT | Performed by: HOSPITALIST

## 2018-02-25 PROCEDURE — 83010 ASSAY OF HAPTOGLOBIN QUANT: CPT | Performed by: HOSPITALIST

## 2018-02-25 PROCEDURE — 85025 COMPLETE CBC W/AUTO DIFF WBC: CPT | Performed by: HOSPITALIST

## 2018-02-25 PROCEDURE — 65270000029 HC RM PRIVATE

## 2018-02-25 PROCEDURE — 36592 COLLECT BLOOD FROM PICC: CPT

## 2018-02-25 PROCEDURE — 74011250636 HC RX REV CODE- 250/636: Performed by: INTERNAL MEDICINE

## 2018-02-25 PROCEDURE — 85018 HEMOGLOBIN: CPT | Performed by: ORTHOPAEDIC SURGERY

## 2018-02-25 PROCEDURE — 97530 THERAPEUTIC ACTIVITIES: CPT

## 2018-02-25 PROCEDURE — 74011250636 HC RX REV CODE- 250/636: Performed by: HOSPITALIST

## 2018-02-25 PROCEDURE — 77030013131 HC IV BLD ST ICUM -A

## 2018-02-25 PROCEDURE — P9016 RBC LEUKOCYTES REDUCED: HCPCS | Performed by: HOSPITALIST

## 2018-02-25 PROCEDURE — 36430 TRANSFUSION BLD/BLD COMPNT: CPT

## 2018-02-25 PROCEDURE — 86920 COMPATIBILITY TEST SPIN: CPT | Performed by: HOSPITALIST

## 2018-02-25 RX ORDER — SODIUM CHLORIDE 9 MG/ML
75 INJECTION, SOLUTION INTRAVENOUS CONTINUOUS
Status: DISPENSED | OUTPATIENT
Start: 2018-02-25 | End: 2018-02-26

## 2018-02-25 RX ORDER — SODIUM CHLORIDE 9 MG/ML
250 INJECTION, SOLUTION INTRAVENOUS AS NEEDED
Status: DISCONTINUED | OUTPATIENT
Start: 2018-02-25 | End: 2018-02-26

## 2018-02-25 RX ORDER — HYDRALAZINE HYDROCHLORIDE 20 MG/ML
10 INJECTION INTRAMUSCULAR; INTRAVENOUS
Status: DISCONTINUED | OUTPATIENT
Start: 2018-02-25 | End: 2018-03-07 | Stop reason: HOSPADM

## 2018-02-25 RX ADMIN — Medication 300 UNITS: at 22:37

## 2018-02-25 RX ADMIN — SODIUM CHLORIDE 100 ML/HR: 900 INJECTION, SOLUTION INTRAVENOUS at 15:34

## 2018-02-25 RX ADMIN — OXYCODONE HYDROCHLORIDE AND ACETAMINOPHEN 2 TABLET: 5; 325 TABLET ORAL at 16:16

## 2018-02-25 RX ADMIN — HYDRALAZINE HYDROCHLORIDE 20 MG: 20 INJECTION INTRAMUSCULAR; INTRAVENOUS at 06:51

## 2018-02-25 RX ADMIN — HEPARIN SODIUM 5000 UNITS: 5000 INJECTION, SOLUTION INTRAVENOUS; SUBCUTANEOUS at 00:54

## 2018-02-25 RX ADMIN — VANCOMYCIN HYDROCHLORIDE 1000 MG: 1 INJECTION, POWDER, LYOPHILIZED, FOR SOLUTION INTRAVENOUS at 09:49

## 2018-02-25 RX ADMIN — Medication 300 UNITS: at 05:23

## 2018-02-25 RX ADMIN — OXYCODONE HYDROCHLORIDE AND ACETAMINOPHEN 2 TABLET: 5; 325 TABLET ORAL at 22:38

## 2018-02-25 RX ADMIN — Medication 10 ML: at 22:38

## 2018-02-25 RX ADMIN — Medication 10 ML: at 14:09

## 2018-02-25 RX ADMIN — HEPARIN SODIUM 5000 UNITS: 5000 INJECTION, SOLUTION INTRAVENOUS; SUBCUTANEOUS at 09:45

## 2018-02-25 RX ADMIN — VANCOMYCIN HYDROCHLORIDE 1000 MG: 1 INJECTION, POWDER, LYOPHILIZED, FOR SOLUTION INTRAVENOUS at 00:53

## 2018-02-25 RX ADMIN — OXYCODONE HYDROCHLORIDE AND ACETAMINOPHEN 2 TABLET: 5; 325 TABLET ORAL at 01:13

## 2018-02-25 RX ADMIN — VANCOMYCIN HYDROCHLORIDE 1000 MG: 1 INJECTION, POWDER, LYOPHILIZED, FOR SOLUTION INTRAVENOUS at 16:15

## 2018-02-25 RX ADMIN — DOCUSATE SODIUM 100 MG: 100 CAPSULE, LIQUID FILLED ORAL at 16:17

## 2018-02-25 RX ADMIN — Medication 10 ML: at 05:22

## 2018-02-25 RX ADMIN — VANCOMYCIN HYDROCHLORIDE 1000 MG: 1 INJECTION, POWDER, LYOPHILIZED, FOR SOLUTION INTRAVENOUS at 23:41

## 2018-02-25 RX ADMIN — Medication 300 UNITS: at 14:09

## 2018-02-25 RX ADMIN — OXYCODONE HYDROCHLORIDE AND ACETAMINOPHEN 2 TABLET: 5; 325 TABLET ORAL at 09:45

## 2018-02-25 RX ADMIN — Medication 10 ML: at 06:51

## 2018-02-25 RX ADMIN — DOCUSATE SODIUM 100 MG: 100 CAPSULE, LIQUID FILLED ORAL at 00:53

## 2018-02-25 RX ADMIN — DOCUSATE SODIUM 100 MG: 100 CAPSULE, LIQUID FILLED ORAL at 09:45

## 2018-02-25 NOTE — PROGRESS NOTES
Problem: Falls - Risk of  Goal: *Absence of Falls  Document Kinjal Fall Risk and appropriate interventions in the flowsheet.    Outcome: Progressing Towards Goal  Fall Risk Interventions:  Mobility Interventions: Bed/chair exit alarm, Communicate number of staff needed for ambulation/transfer, OT consult for ADLs, Patient to call before getting OOB, PT Consult for mobility concerns, PT Consult for assist device competence, Strengthening exercises (ROM-active/passive), Utilize walker, cane, or other assitive device, Utilize gait belt for transfers/ambulation    Mentation Interventions: Adequate sleep, hydration, pain control, Bed/chair exit alarm, Door open when patient unattended, Evaluate medications/consider consulting pharmacy, Eyeglasses and hearing aids, Familiar objects from home, Family/sitter at bedside, Gait belt with transfers/ambulation, Increase mobility, More frequent rounding, Reorient patient, Room close to nurse's station, Self-releasing belt, Toileting rounds, Update white board    Medication Interventions: Assess postural VS orthostatic hypotension, Bed/chair exit alarm, Evaluate medications/consider consulting pharmacy, Patient to call before getting OOB, Teach patient to arise slowly, Utilize gait belt for transfers/ambulation    Elimination Interventions: Bed/chair exit alarm, Call light in reach, Elevated toilet seat, Patient to call for help with toileting needs, Toilet paper/wipes in reach, Toileting schedule/hourly rounds

## 2018-02-25 NOTE — PROGRESS NOTES
Problem: Self Care Deficits Care Plan (Adult)  Goal: *Acute Goals and Plan of Care (Insert Text)  1. Patient will complete functional transfers with supervision with WBAT status in LLE with knee imobilizer in place and with adaptive equipment as needed. 3. Patient will complete lower body bathing and dressing with minimal assistance and adaptive equipment as needed. 4. Patient will complete toileting and toilet transfer with supervision. 5. Patient will tolerate 30 minutes of OT treatment with 2-3 rest breaks to increase activity tolerance for ADLs. 6. Patient will participate in at least 15 minutes of BUE therapeutic exercises to strengthen BUE for functional transfers. Timeframe: 7 visits         OCCUPATIONAL THERAPY: Daily Note, Treatment Day: 1st and AM 2/25/2018  INPATIENT: Hospital Day: 5  Payor: Joan Jon / Plan: [place] [facility]Less [facility] / Product Type: PPO /      NAME/AGE/GENDER: Arianne Bowman is a 57 y.o. male   PRIMARY DIAGNOSIS:  Open wound of left knee, initial encounter [S81.002A] Septic arthritis of knee, left (HCC) Septic arthritis of knee, left (HCC)  Procedure(s) (LRB):  IRRIGATION AND DEBRIDEMENT LEFT KNEE/ BEING ADMITTED FROM [place] [id number] ON 2.20 (Left)  4 Days Post-Op  ICD-10: Treatment Diagnosis:    · Pain in Left Knee (M25.562)  · Stiffness of Left Knee, Not elsewhere classified (M25.662)  · Generalized Muscle Weakness (M62.81)  · Other lack of cordination (R27.8)  · History of falling (Z91.81)   Precautions/Allergies:     Review of patient's allergies indicates no known allergies. ASSESSMENT:   Mr. Pedro Chu was admitted for the above diagnosis. Pt presents supine upon arrival. Pt required min assist for supine to sit transfer due to LE weakness and discomfort. Pt able to stand and transfer over to chair in front of sink with CGA. Pt able to complete self care tasks (in grid below) while seated at sink. Pt did really well with tasks. Left in chair at end of session.  RN in room at this time giving blood. Pt is progressing towards all goals above. Will continue to benefit from skilled OT during stay. This section established at most recent assessment   PROBLEM LIST (Impairments causing functional limitations):  1. Decreased Strength  2. Decreased ADL/Functional Activities  3. Decreased Transfer Abilities  4. Decreased Ambulation Ability/Technique  5. Decreased Balance  6. Increased Pain  7. Decreased Activity Tolerance  8. Decreased Flexibility/Joint Mobility  9. Edema/Girth  10. Decreased Knowledge of Precautions  11. Decreased [place] with Home Exercise Program   INTERVENTIONS PLANNED: (Benefits and precautions of occupational therapy have been discussed with the patient.)  1. Activities of daily living training  2. Adaptive equipment training  3. Balance training  4. Clothing management  5. Donning&doffing training  6. Group therapy  7. Neuromuscular re-eduation  8. Therapeutic activity  9. Therapeutic exercise     TREATMENT PLAN: Frequency/Duration: Follow patient 3 times per week to address above goals. Rehabilitation Potential For Stated Goals: Excellent     RECOMMENDED REHABILITATION/EQUIPMENT: (at time of discharge pending progress): Due to the probability of continued deficits (see above) this patient will likely need continued skilled occupational therapy after discharge. Equipment:    TBD              OCCUPATIONAL PROFILE AND HISTORY:   History of Present Injury/Illness (Reason for Referral):  See H&P  Past Medical History/Comorbidities:   Mr. Kiah Oconnell  has no past medical history on file. Mr. Kiah Oconnell  has a past surgical history that includes hx orthopaedic.   Social History/Living Environment:   Home Environment: Private residence  Wheelchair Ramp: Yes  One/Two Story Residence: One story  Living Alone: Yes  Support Systems: Family member(s)  Patient Expects to be Discharged to[de-identified] Private residence  Current DME Used/Available at Home: Cane, straight  Prior Level of Function/Work/Activity:  Pt reports that he lives in one story home with ramp to enter in [place]. Pt states he completes functional mobility/ADL with independence. Pt does have a cane and rolling walker. Pt state that his wife passed away but unclear if he lives alone or if he is remarried. Personal Factors:          Past/Current Experience:  Infection of knee with recent I &D        Overall Behavior:  Demanding; lacks some awareness of need for safety/assistance          Number of Personal Factors/Comorbidities that affect the Plan of Care: Expanded review of therapy/medical records (1-2):  MODERATE COMPLEXITY   ASSESSMENT OF OCCUPATIONAL PERFORMANCE[de-identified]   Activities of Daily Living:           Basic ADLs (From Assessment) Complex ADLs (From Assessment)   Basic ADL  Feeding: Independent  Oral Facial Hygiene/Grooming: Stand-by assistance  Bathing: Moderate assistance  Upper Body Dressing: Stand-by assistance  Lower Body Dressing: Maximum assistance  Toileting: Moderate assistance Instrumental ADL  Meal Preparation: Maximum assistance  Homemaking: Total assistance   Grooming/Bathing/Dressing Activities of Daily Living   Grooming  Washing Face: Supervision/set-up  Brushing Teeth: Supervision/set-up Cognitive Retraining  Safety/Judgement: Awareness of environment   Upper Body Bathing  Bathing Assistance: Supervision/set-up  Position Performed: Seated in chair     Lower Body Bathing  Perineal  : Stand-by assistance  Position Performed: Standing  Lower Body : Supervision/set-up  Position Performed: Seated in chair     Upper [address]: Supervision/ set-up     Lower Body Dressing Assistance  Socks: Minimum assistance; Moderate assistance (able to don right sock)  Leg Crossed Method Used: No  Position Performed: Seated in chair Bed/Mat Mobility  Supine to Sit: Contact guard assistance;Minimum assistance  Sit to Stand: Stand-by assistance;Contact guard assistance  Bed to Chair: Stand-by assistance;Contact guard assistance       Most Recent Physical Functioning:   Gross Assessment:                  Posture:  Posture (WDL): Exceptions to WDL  Balance:  Sitting: Intact  Standing: Impaired  Standing - Static: Good  Standing - Dynamic : Fair Bed Mobility:  Supine to Sit: Contact guard assistance;Minimum assistance  Wheelchair Mobility:     Transfers:  Sit to Stand: Stand-by assistance;Contact guard assistance  Bed to Chair: Stand-by assistance;Contact guard assistance                Patient Vitals for the past 6 hrs:   BP BP Patient Position SpO2 Pulse   18 0813 124/77 At rest 96 % (!) 104   18 1011 106/81 At rest 94 % (!) 101   18 1036 125/71 At rest 96 % (!) 118   18 1128 136/71 At rest 98 % (!) 108   18 1232 120/68 At rest 100 % (!) 119       Mental Status  Neurologic State: Alert, Appropriate for age  Orientation Level: Oriented X4  Cognition: Follows commands  Perception: Appears intact  Perseveration: No perseveration noted  Safety/Judgement: Awareness of environment                          Physical Skills Involved:  1. Range of Motion  2. Balance  3. Strength  4. Activity Tolerance  5. Sensation  6. Pain (acute)  7. Edema Cognitive Skills Affected (resulting in the inability to perform in a timely and safe manner):  1. [facility] Psychosocial Skills Affected:  1. Habits/Routines  2. Social Interaction  3. Self-Awareness   Number of elements that affect the Plan of Care: 5+:  HIGH COMPLEXITY   CLINICAL DECISION MAKIN hospitals [address] [id number] AM-PAC 6 Clicks   Daily Activity Inpatient Short Form  How much help from another person does the patient currently need. .. Total A Lot A Little None   1. Putting on and taking off regular lower body clothing? [] 1   [x] 2   [] 3   [] 4   2. Bathing (including washing, rinsing, drying)? [] 1   [x] 2   [] 3   [] 4   3. Toileting, which includes using toilet, bedpan or urinal?   [] 1   [x] 2   [] 3   [] 4   4.   Putting on and taking off regular upper body clothing? [] 1   [] 2   [x] 3   [] 4   5. Taking care of personal grooming such as brushing teeth? [] 1   [] 2   [x] 3   [] 4   6. Eating meals? [] 1   [] 2   [x] 3   [] 4   © 2007, Trustees of [address] [place] [address] [address] [id number], under license to [employer]. All rights reserved      Score:  Initial: 15 Most Recent: X (Date: -- )    Interpretation of Tool:  Represents activities that are increasingly more difficult (i.e. Bed mobility, Transfers, Gait). Score 24 23 22-20 19-15 14-10 9-7 6     Modifier CH CI CJ CK CL CM CN      ? Self Care:     - CURRENT STATUS: CK - 40%-59% impaired, limited or restricted    - GOAL STATUS: CJ - 20%-39% impaired, limited or restricted    - D/C STATUS:  ---------------To be determined---------------  Payor: BLUE CROSS / [place] BLUE CROSS [address] / Product Type: PPO /      Medical Necessity:     · Patient demonstrates excellent rehab potential due to higher previous functional level. Reason for Services/Other Comments:  · Patient continues to require skilled intervention due to decreased independence with ADL/functional transfers. Use of outcome tool(s) and clinical judgement create a POC that gives a: LOW COMPLEXITY         TREATMENT:   (In addition to Assessment/Re-Assessment sessions the following treatments were rendered)     Pre-treatment Symptoms/Complaints:    Pain: Initial:   Pain Intensity 1: 0  Post Session:  3/10     Self Care: (25 minutes): Procedure(s) (per grid) utilized to improve and/or restore self-care/home management as related to dressing, bathing and grooming. Required minimal verbal and tactile cueing to facilitate activities of daily living skills. Therapeutic Activity: (15 minutes): Therapeutic activities including Bed transfers and Chair transfers to improve mobility, strength and balance. Required occasional CGA to promote static and dynamic balance in standing.          Braces/Orthotics/Lines/Etc: · IV  · O2 Device: Room air  Treatment/Session Assessment:    · Response to Treatment: Tolerated well   · Interdisciplinary Collaboration:   o Certified Occupational Therapy Assistant  o Registered Nurse  · After treatment position/precautions:   o Up in chair  o Bed alarm/tab alert on  o Bed/Chair-wheels locked  o Bed in low position  o Call light within reach  o Nurse at bedside   · Compliance with Program/Exercises: Will assess as treatment progresses. · Recommendations/Intent for next treatment session: \"Next visit will focus on advancements to more challenging activities and reduction in assistance provided\".   Total Treatment Duration:  OT Patient Time In/Time Out  Time In: 0945  Time Out: Manjeet [address]

## 2018-02-25 NOTE — PROGRESS NOTES
2018         Post Op day: 4 Days Post-Op     Admit Date: 2018  Admit Diagnosis: Open wound of left knee, initial encounter [S81.002A]    LAB:    Recent Results (from the past 24 hour(s))   SED RATE, AUTOMATED    Collection Time: 18  2:53 PM   Result Value Ref Range    Sed rate, automated 37 (H) 0 - 20 mm/hr   C REACTIVE PROTEIN, QT    Collection Time: 18  2:53 PM   Result Value Ref Range    C-Reactive protein 11.0 (H) 0.0 - 0.9 mg/dL   CBC WITH AUTOMATED DIFF    Collection Time: 18  5:17 AM   Result Value Ref Range    WBC 12.4 (H) 4.3 - 11.1 K/uL    RBC 2.60 (L) 4.23 - 5.67 M/uL    HGB 6.7 (LL) 13.6 - 17.2 g/dL    HCT 21.5 (L) 41.1 - 50.3 %    MCV 82.7 79.6 - 97.8 FL    MCH 25.8 (L) 26.1 - 32.9 PG    MCHC 31.2 (L) 31.4 - 35.0 g/dL    RDW 18.8 (H) 11.9 - 14.6 %    PLATELET 547 (H) 701 - 450 K/uL    MPV 8.8 (L) 10.8 - 14.1 FL    DF AUTOMATED      NEUTROPHILS 67 43 - 78 %    LYMPHOCYTES 20 13 - 44 %    MONOCYTES 9 4.0 - 12.0 %    EOSINOPHILS 3 0.5 - 7.8 %    BASOPHILS 0 0.0 - 2.0 %    IMMATURE GRANULOCYTES 1 0.0 - 5.0 %    ABS. NEUTROPHILS 8.2 1.7 - 8.2 K/UL    ABS. LYMPHOCYTES 2.5 0.5 - 4.6 K/UL    ABS. MONOCYTES 1.1 0.1 - 1.3 K/UL    ABS. EOSINOPHILS 0.4 0.0 - 0.8 K/UL    ABS. BASOPHILS 0.0 0.0 - 0.2 K/UL    ABS. IMM.  GRANS. 0.1 0.0 - 0.5 K/UL   METABOLIC PANEL, BASIC    Collection Time: 18  5:17 AM   Result Value Ref Range    Sodium 139 136 - 145 mmol/L    Potassium 3.8 3.5 - 5.1 mmol/L    Chloride 104 98 - 107 mmol/L    CO2 27 21 - 32 mmol/L    Anion gap 8 7 - 16 mmol/L    Glucose 108 (H) 65 - 100 mg/dL    BUN 14 8 - 23 MG/DL    Creatinine 0.99 0.8 - 1.5 MG/DL    GFR est AA >60 >60 ml/min/1.73m2    GFR est non-AA >60 >60 ml/min/1.73m2    Calcium 9.4 8.3 - 10.4 MG/DL     Vital Signs:    Patient Vitals for the past 8 hrs:   BP Temp Pulse Resp SpO2   18 0813 124/77 98.6 °F (37 °C) (!) 104 19 96 %   18 0619 (!) 169/100 - (!) 102 - -     Temp (24hrs), Av.1 °F (37.3 °C), Min:98.6 °F (37 °C), Max:100 °F (37.8 °C)    Body mass index is 38.01 kg/(m^2).   Pain Control:   Pain Assessment  Pain Scale 1: Numeric (0 - 10)  Pain Intensity 1: 0  Pain Onset 1: post op  Pain Location 1: Knee  Pain Orientation 1: Left  Pain Description 1: Aching  Pain Intervention(s) 1: Medication (see MAR)    Subjective: Doing well, No complaints, No SOB, No Chest Pain, No Nausea or Vomitting     Objective: Vital Signs are Stable, No Acute Distress, Alert and Oriented, Dressing is Dry,  Neurovascular exam is normal.       PT/OT:            Assistive Device: Fall prevention device, Walker (comment)                Weight Bearing Status: WBAT    Meds:  Current Facility-Administered Medications   Medication Dose Route Frequency    0.9% sodium chloride infusion 250 mL  250 mL IntraVENous PRN    docusate sodium (COLACE) capsule 100 mg  100 mg Oral BID    lip protectant (BLISTEX) ointment   Topical PRN    sodium chloride (NS) flush 10 mL  10 mL InterCATHeter Q8H    heparin (porcine) pf 300 Units  300 Units InterCATHeter Q8H    sodium chloride (NS) flush 10 mL  10 mL InterCATHeter PRN    heparin (porcine) pf 300 Units  300 Units InterCATHeter PRN    vancomycin (VANCOCIN) 1,000 mg in 0.9% sodium chloride (MBP/ADV) 250 mL  1 g IntraVENous Q8H    acetaminophen (TYLENOL) tablet 650 mg  650 mg Oral Q6H PRN    heparin (porcine) injection 5,000 Units  5,000 Units SubCUTAneous Q8H    hydrALAZINE (APRESOLINE) 20 mg/mL injection 20 mg  20 mg IntraVENous Q6H PRN    naloxone (NARCAN) injection 0.4 mg  0.4 mg IntraVENous EVERY 2 MINUTES AS NEEDED    oxyCODONE-acetaminophen (PERCOCET) 5-325 mg per tablet 1 Tab  1 Tab Oral Q4H PRN    oxyCODONE-acetaminophen (PERCOCET) 5-325 mg per tablet 2 Tab  2 Tab Oral Q4H PRN    traMADol (ULTRAM) tablet 50 mg  50 mg Oral Q6H PRN      Assessment: Status Post Total        Arthoplasty      Patient Active Problem List   Diagnosis Code    Leukocytosis D72.829    Hypoalbuminemia E88.09    Anemia D64.9    Left leg cellulitis L03. 116    Abnormal transaminases R74.8    Thrombocytosis (HCC) D47.3    Cellulitis L03.90    Septic arthritis of knee, left (HCC) M00.9    MRSA bacteremia R78.81       Plan: Continue Physical Therapy, Monitor INR and Hbg.      Alert mild swelling calf soft  Hgb low will transfuse cont ABX  Signed By: Jimmie Mckeon MD

## 2018-02-25 NOTE — PROGRESS NOTES
Problem: Falls - Risk of  Goal: *Absence of Falls  Document Kinjal Fall Risk and appropriate interventions in the flowsheet.    Outcome: Progressing Towards Goal  Fall Risk Interventions:  Mobility Interventions: Bed/chair exit alarm, OT consult for ADLs, Patient to call before getting OOB, PT Consult for mobility concerns, PT Consult for assist device competence    Mentation Interventions: Adequate sleep, hydration, pain control, Bed/chair exit alarm, Door open when patient unattended, Evaluate medications/consider consulting pharmacy, Eyeglasses and hearing aids, Familiar objects from home, Family/sitter at bedside, Gait belt with transfers/ambulation, Increase mobility, More frequent rounding, Reorient patient, Room close to nurse's station, Self-releasing belt, Toileting rounds, Update white board    Medication Interventions: Evaluate medications/consider consulting pharmacy, Patient to call before getting OOB    Elimination Interventions: Call light in reach, Patient to call for help with toileting needs

## 2018-02-25 NOTE — PROGRESS NOTES
02/24/18 2340   Vital Signs   Temp 98.8 °F (37.1 °C)   Temp Source Oral   Pulse (Heart Rate) (!) 114   Heart Rate Source Monitor   Resp Rate 19   O2 Sat (%) 99 %   Level of Consciousness Alert   /69   MAP (Calculated) 95   MEWS Score 3   Patient Observation   Repositioned Head of bed elevated (degrees)   Patient Turned Supine; Turns self   Observations pts. vitals obtain,RN/ family at bedside. Patient has a MEWs score of 3, due to elevated heart rate. Patient medicated for pain. Vital Signs are being taken every 4 hours. Will continue to monitor.

## 2018-02-25 NOTE — PROGRESS NOTES
Hospitalist Progress Note    2018  Admit Date: 2018  9:12 AM   NAME: Melody Pump   :  1955   DOS:              18  MRN:  [id number]   Attending: Tunde Hines MD  PCP:  None  Treatment Team: Attending Provider: Freedom Dickens. Kai Ayala MD; Physician: Luisana Lai MD; Consulting Provider: Ruth Baker MD; Consulting Provider: Brigido Diaz MD; Care Manager: Evette Bennett RN; Consulting Provider: Joss Martines MD; Primary Nurse: Luis Angel Cordero RN    Full Code     SUBJECTIVE:   As previously documented: \" Mr. Yogesh Geller is a 57 y. o. male who has no significant PMHx who was admitted on 18 for left leg non purulent cellulitis. Of note, he lives in [place] and came to visit family members to [place], [place] on 18. He denied falls, prior knee or leg surgery. Duplex was was unremarkable for DVT. He was started on vancomycin. Has persistent Blood cultures positive for MRSA bacteremia. ECHO negative for vegetations. Due to poor improvement of his thigh and leg edema and pain he underwent an MRI of his leg showing multiloculated abscesses and findings of septic knee. He underwent I&D and knee lavage on 18 by general surgery. Orthopedics performed knee aspiration. So far, all BC and wound aspirate and cultures are positive for staph aureus. ID consulted. Plan for outpatient antibiotic completion at SNF/Rehab and wound care once Blood cultures are negative and he gets a PICC line. Patient still has persistent leukocytosis. He is being transferred to [facility] for further surgical intervention by Orthopedic group- Dr. Doshi Forward".           18   Mr. Melody Suarez continues to have low grade fevers. Tachycardic. Denies SOB, CP or abdominal pain. H/H:6.7/21/5 from 7.6/24.7. No diarrhea , N/V. No obvious signs of bleeding. On IV Vancomycin with EOT 3/19/18 .             10+ ROS reviewed and negative except for positive in HPI.    No Known Allergies  Current Facility-Administered Medications   Medication Dose Route Frequency    0.9% sodium chloride infusion 250 mL  250 mL IntraVENous PRN    0.9% sodium chloride infusion  100 mL/hr IntraVENous CONTINUOUS    docusate sodium (COLACE) capsule 100 mg  100 mg Oral BID    lip protectant (BLISTEX) ointment   Topical PRN    sodium chloride (NS) flush 10 mL  10 mL InterCATHeter Q8H    heparin (porcine) pf 300 Units  300 Units InterCATHeter Q8H    sodium chloride (NS) flush 10 mL  10 mL InterCATHeter PRN    heparin (porcine) pf 300 Units  300 Units InterCATHeter PRN    vancomycin (VANCOCIN) 1,000 mg in 0.9% sodium chloride (MBP/ADV) 250 mL  1 g IntraVENous Q8H    acetaminophen (TYLENOL) tablet 650 mg  650 mg Oral Q6H PRN    hydrALAZINE (APRESOLINE) 20 mg/mL injection 20 mg  20 mg IntraVENous Q6H PRN    naloxone (NARCAN) injection 0.4 mg  0.4 mg IntraVENous EVERY 2 MINUTES AS NEEDED    oxyCODONE-acetaminophen (PERCOCET) 5-325 mg per tablet 1 Tab  1 Tab Oral Q4H PRN    oxyCODONE-acetaminophen (PERCOCET) 5-325 mg per tablet 2 Tab  2 Tab Oral Q4H PRN    traMADol (ULTRAM) tablet 50 mg  50 mg Oral Q6H PRN         Immunization History   Administered Date(s) Administered    TB Skin Test (PPD) Intradermal 2018     Objective:     Patient Vitals for the past 24 hrs:   Temp Pulse Resp BP SpO2   18 1438 99.7 °F (37.6 °C) (!) 116 18 118/65 96 %   18 1350 99.1 °F (37.3 °C) (!) 114 16 118/60 99 %   18 1232 99.4 °F (37.4 °C) (!) 119 20 120/68 100 %   18 1128 100 °F (37.8 °C) (!) 108 20 136/71 98 %   18 1036 99.6 °F (37.6 °C) (!) 118 20 125/71 96 %   18 1011 99.2 °F (37.3 °C) (!) 101 16 106/81 94 %   18 0813 98.6 °F (37 °C) (!) 104 19 124/77 96 %   18 0619 - (!) 102 - (!) 169/100 -   18 2340 98.8 °F (37.1 °C) (!) 114 19 147/69 99 %   18 2006 99.4 °F (37.4 °C) (!) 109 19 148/76 100 %   18 1508 100 °F (37.8 °C) (!) 113 19 112/65 98 %     Temp (24hrs), Av.4 °F (37.4 °C), Min:98.6 °F (37 °C), Max:100 °F (37.8 °C)    Oxygen Therapy  O2 Sat (%): 96 % (02/25/18 1438)  Pulse via Oximetry: 111 beats per minute (02/22/18 1700)  O2 Device: Room air (02/24/18 0711)  O2 Flow Rate (L/min): 3 l/min (02/21/18 1557)  Oxygen Therapy  O2 Sat (%): 96 % (02/25/18 1438)  Pulse via Oximetry: 111 beats per minute (02/22/18 1700)  O2 Device: Room air (02/24/18 0711)  O2 Flow Rate (L/min): 3 l/min (02/21/18 1557)    Physical Exam:  General:         Alert, cooperative, no acute  Distress. Low grade fevers   HEENT:               NCAT. No obvious deformity. Nares normal. No drainage  Lungs:         CTABL. No wheezing/rhonchi/rales  Cardiovascular:   RRR. No m/r/g. 1+ pitting pedal edema b/l. +2 PT/DT pulses b/l. Abdomen:       S/nt/nd. Bowel sounds normal. .   Skin:         No rashes or lesions. Not Jaundiced  Musculoskeletal: L knee with dressing, no discharge. No obvious bleeding. Enlarge left thigh on distal area. Neurologic:    AAOx3. [place] Gains No gross focal deficit. Moves all extremities. Psychiatric:         Good mood. Normal affect.               DIAGNOSTIC STUDIES      Data Review:   Recent Results (from the past 24 hour(s))   SED RATE, AUTOMATED    Collection Time: 02/24/18  2:53 PM   Result Value Ref Range    Sed rate, automated 37 (H) 0 - 20 mm/hr   C REACTIVE PROTEIN, QT    Collection Time: 02/24/18  2:53 PM   Result Value Ref Range    C-Reactive protein 11.0 (H) 0.0 - 0.9 mg/dL   CBC WITH AUTOMATED DIFF    Collection Time: 02/25/18  5:17 AM   Result Value Ref Range    WBC 12.4 (H) 4.3 - 11.1 K/uL    RBC 2.60 (L) 4.23 - 5.67 M/uL    HGB 6.7 (LL) 13.6 - 17.2 g/dL    HCT 21.5 (L) 41.1 - 50.3 %    MCV 82.7 79.6 - 97.8 FL    MCH 25.8 (L) 26.1 - 32.9 PG    MCHC 31.2 (L) 31.4 - 35.0 g/dL    RDW 18.8 (H) 11.9 - 14.6 %    PLATELET 263 (H) 528 - 450 K/uL    MPV 8.8 (L) 10.8 - 14.1 FL    DF AUTOMATED      NEUTROPHILS 67 43 - 78 %    LYMPHOCYTES 20 13 - 44 %    MONOCYTES 9 4.0 - 12.0 % EOSINOPHILS 3 0.5 - 7.8 %    BASOPHILS 0 0.0 - 2.0 %    IMMATURE GRANULOCYTES 1 0.0 - 5.0 %    ABS. NEUTROPHILS 8.2 1.7 - 8.2 K/UL    ABS. LYMPHOCYTES 2.5 0.5 - 4.6 K/UL    ABS. MONOCYTES 1.1 0.1 - 1.3 K/UL    ABS. EOSINOPHILS 0.4 0.0 - 0.8 K/UL    ABS. BASOPHILS 0.0 0.0 - 0.2 K/UL    ABS. IMM. GRANS. 0.1 0.0 - 0.5 K/UL   METABOLIC PANEL, BASIC    Collection Time: 02/25/18  5:17 AM   Result Value Ref Range    Sodium 139 136 - 145 mmol/L    Potassium 3.8 3.5 - 5.1 mmol/L    Chloride 104 98 - 107 mmol/L    CO2 27 21 - 32 mmol/L    Anion gap 8 7 - 16 mmol/L    Glucose 108 (H) 65 - 100 mg/dL    BUN 14 8 - 23 MG/DL    Creatinine 0.99 0.8 - 1.5 MG/DL    GFR est AA >60 >60 ml/min/1.73m2    GFR est non-AA >60 >60 ml/min/1.73m2    Calcium 9.4 8.3 - 10.4 MG/DL   HAPTOGLOBIN    Collection Time: 02/25/18  5:17 AM   Result Value Ref Range    Haptoglobin 373 (H) 30 - 200 mg/dL   TYPE + CROSSMATCH    Collection Time: 02/25/18  7:34 AM   Result Value Ref Range    Crossmatch Expiration 02/28/2018     ABO/Rh(D) O POSITIVE     Antibody screen NEG     Unit number [id number]     Blood component type [facility]     Unit division 00     Status of unit ISSUED     Crossmatch result Compatible     Unit number [id number]     Blood component type [facility]     Unit division 00     Status of unit ALLOCATED     Crossmatch result Compatible    HGB & HCT    Collection Time: 02/25/18  2:03 PM   Result Value Ref Range    HGB 8.0 (L) 13.6 - 17.2 g/dL    HCT 25.5 (L) 41.1 - 50.3 %       All Micro Results     None          Imaging /Procedures /Studies:    MRI LLE 2/16/18 FINDINGS:   There are multiple fluid collections within the deep muscles of the  left leg particularly along the anterior and lateral thigh. One of these  components measures approximately 3.0 x 6.5 x 4.0 cm. There are smaller  components below the knee medially extending to the upper to mid calf. There is  a large joint effusion with peripheral enhancement.  There is enhancement about  the joint space. There is marrow edema associated with the medial compartment  which is relatively symmetric to the contralateral side, more likely to be  degenerative rather than osteomyelitis. There is extensive muscular edema. There  is a large joint effusion on the right as well although postcontrast imaging of  this knee was not performed. IMPRESSION:  1. Multiloculated fluid collections within the left lower extremity most  compatible with abscesses. A large joint effusion is present with enhancement as  well as enhancement within the joint space therefore concerning for septic  arthritis. There are no definite findings of osteomyelitis. 2. Large right joint effusion. Echocardiogram 2/14/18: SUMMARY:  -  Left ventricle: Systolic function was normal. Ejection fraction was  estimated in the range of 55 % to 60 %. There were no regional wall motion  abnormalities. There was mild concentric hypertrophy. The E/e' ratio was 8.3.  -  Pericardium: There was no pericardial effusion.     Labs and Studies from previous 24 hours have been personally reviewed by myself [address] [employer] Problems as of 2/25/2018  Date Reviewed: 2/20/2018          Codes Class Noted - Resolved POA    * (Principal)Septic arthritis of knee, left ([facility] [address] ICD-10-CM: M00.9  ICD-9-CM: 711.06  2/21/2018 - Present Yes        MRSA bacteremia ICD-10-CM: R78.81  ICD-9-CM: 790.7, 041.12  2/21/2018 - Present Yes        Leukocytosis ICD-10-CM: D72.829  ICD-9-CM: 288.60  2/13/2018 - Present Yes        Hypoalbuminemia ICD-10-CM: E88.09  ICD-9-CM: 273.8  2/13/2018 - Present Yes        Anemia ICD-10-CM: D64.9  ICD-9-CM: 285.9  2/13/2018 - Present Yes        Left leg cellulitis ICD-10-CM: L03.116  ICD-9-CM: 682.6  2/13/2018 - Present Yes        Abnormal transaminases ICD-10-CM: R74.8  ICD-9-CM: 790.4  2/13/2018 - Present Yes              Plan:  L knee septic arthritis with MRSA bacteremia and leg cellulitis:  - aspirated on 2/16/18 and s/p I&D 2/21/18  - On IV vancomycin with EOT:3/19/18  - BC 2/2 from 2/19 negative   -TTE and MANJINDER  w/o vegetations. Iron deficiency anemia:  - on Iron  supplementation  -  Hemoccult collection pending  - worried regarding possibility of bleeding in his tigh  - will transfuse 2 PRBCs and monitor H/H  - Hold Heparin SQ    Elevated liver enzymes:  - resolved - unclear etiology  - acute hepatitis panel negative     Debility :  -PT/OT/PPD/IRC  - will need rehab - [facility] vs [facility] vs SNF      DVT Prophylaxis: Heparin SQ  CODE Status: Full CODE  Plan of Care Discussed with: patient.  Care team.  Medical Risk: high  Disposition: pending    Beti Bond MD  02/25/18

## 2018-02-26 ENCOUNTER — APPOINTMENT (OUTPATIENT)
Dept: ULTRASOUND IMAGING | Age: 63
DRG: 853 | End: 2018-02-26
Attending: HOSPITALIST
Payer: COMMERCIAL

## 2018-02-26 LAB
APPEARANCE UR: CLEAR
APTT PPP: 26.8 SEC (ref 23.2–35.3)
BACTERIA SPEC CULT: NORMAL
BASOPHILS # BLD: 0 K/UL (ref 0–0.2)
BASOPHILS NFR BLD: 0 % (ref 0–2)
BILIRUB UR QL: NEGATIVE
COLOR UR: YELLOW
DIFFERENTIAL METHOD BLD: ABNORMAL
EOSINOPHIL # BLD: 0.3 K/UL (ref 0–0.8)
EOSINOPHIL NFR BLD: 3 % (ref 0.5–7.8)
ERYTHROCYTE [DISTWIDTH] IN BLOOD BY AUTOMATED COUNT: 18.7 % (ref 11.9–14.6)
GLUCOSE UR STRIP.AUTO-MCNC: NEGATIVE MG/DL
HCT VFR BLD AUTO: 22.4 % (ref 41.1–50.3)
HCT VFR BLD AUTO: 24.1 % (ref 41.1–50.3)
HCT VFR BLD AUTO: 28.7 % (ref 41.1–50.3)
HGB BLD-MCNC: 7 G/DL (ref 13.6–17.2)
HGB BLD-MCNC: 7.6 G/DL (ref 13.6–17.2)
HGB BLD-MCNC: 9.2 G/DL (ref 13.6–17.2)
HGB UR QL STRIP: NEGATIVE
IMM GRANULOCYTES # BLD: 0.1 K/UL (ref 0–0.5)
IMM GRANULOCYTES NFR BLD AUTO: 1 % (ref 0–5)
INR PPP: 1.1
KETONES UR QL STRIP.AUTO: NEGATIVE MG/DL
LEUKOCYTE ESTERASE UR QL STRIP.AUTO: NEGATIVE
LYMPHOCYTES # BLD: 1.9 K/UL (ref 0.5–4.6)
LYMPHOCYTES NFR BLD: 16 % (ref 13–44)
MCH RBC QN AUTO: 25.8 PG (ref 26.1–32.9)
MCHC RBC AUTO-ENTMCNC: 31.3 G/DL (ref 31.4–35)
MCV RBC AUTO: 82.7 FL (ref 79.6–97.8)
MONOCYTES # BLD: 1.1 K/UL (ref 0.1–1.3)
MONOCYTES NFR BLD: 9 % (ref 4–12)
NEUTS SEG # BLD: 8.8 K/UL (ref 1.7–8.2)
NEUTS SEG NFR BLD: 71 % (ref 43–78)
NITRITE UR QL STRIP.AUTO: NEGATIVE
PH UR STRIP: 5.5 [PH] (ref 5–9)
PLATELET # BLD AUTO: 562 K/UL (ref 150–450)
PMV BLD AUTO: 9.2 FL (ref 10.8–14.1)
PROT UR STRIP-MCNC: NEGATIVE MG/DL
PROTHROMBIN TIME: 14 SEC (ref 11.5–14.5)
RBC # BLD AUTO: 2.71 M/UL (ref 4.23–5.67)
SERVICE CMNT-IMP: NORMAL
SP GR UR REFRACTOMETRY: 1.01 (ref 1–1.02)
UROBILINOGEN UR QL STRIP.AUTO: 2 EU/DL (ref 0.2–1)
WBC # BLD AUTO: 12.3 K/UL (ref 4.3–11.1)

## 2018-02-26 PROCEDURE — 36430 TRANSFUSION BLD/BLD COMPNT: CPT

## 2018-02-26 PROCEDURE — 65270000029 HC RM PRIVATE

## 2018-02-26 PROCEDURE — 77030013131 HC IV BLD ST ICUM -A

## 2018-02-26 PROCEDURE — 74011250636 HC RX REV CODE- 250/636: Performed by: NURSE PRACTITIONER

## 2018-02-26 PROCEDURE — 74011250637 HC RX REV CODE- 250/637: Performed by: FAMILY MEDICINE

## 2018-02-26 PROCEDURE — 85730 THROMBOPLASTIN TIME PARTIAL: CPT | Performed by: HOSPITALIST

## 2018-02-26 PROCEDURE — 97150 GROUP THERAPEUTIC PROCEDURES: CPT

## 2018-02-26 PROCEDURE — 81003 URINALYSIS AUTO W/O SCOPE: CPT | Performed by: HOSPITALIST

## 2018-02-26 PROCEDURE — 74011250636 HC RX REV CODE- 250/636: Performed by: INTERNAL MEDICINE

## 2018-02-26 PROCEDURE — P9016 RBC LEUKOCYTES REDUCED: HCPCS | Performed by: HOSPITALIST

## 2018-02-26 PROCEDURE — 85025 COMPLETE CBC W/AUTO DIFF WBC: CPT | Performed by: HOSPITALIST

## 2018-02-26 PROCEDURE — 85014 HEMATOCRIT: CPT | Performed by: HOSPITALIST

## 2018-02-26 PROCEDURE — 93971 EXTREMITY STUDY: CPT

## 2018-02-26 PROCEDURE — 97530 THERAPEUTIC ACTIVITIES: CPT

## 2018-02-26 PROCEDURE — 99232 SBSQ HOSP IP/OBS MODERATE 35: CPT | Performed by: PHYSICAL MEDICINE & REHABILITATION

## 2018-02-26 PROCEDURE — 85610 PROTHROMBIN TIME: CPT | Performed by: HOSPITALIST

## 2018-02-26 PROCEDURE — 76775 US EXAM ABDO BACK WALL LIM: CPT

## 2018-02-26 RX ORDER — SODIUM CHLORIDE 9 MG/ML
250 INJECTION, SOLUTION INTRAVENOUS AS NEEDED
Status: DISCONTINUED | OUTPATIENT
Start: 2018-02-26 | End: 2018-03-07 | Stop reason: HOSPADM

## 2018-02-26 RX ADMIN — OXYCODONE HYDROCHLORIDE AND ACETAMINOPHEN 2 TABLET: 5; 325 TABLET ORAL at 20:32

## 2018-02-26 RX ADMIN — VANCOMYCIN HYDROCHLORIDE 1000 MG: 1 INJECTION, POWDER, LYOPHILIZED, FOR SOLUTION INTRAVENOUS at 10:43

## 2018-02-26 RX ADMIN — OXYCODONE HYDROCHLORIDE AND ACETAMINOPHEN 2 TABLET: 5; 325 TABLET ORAL at 09:05

## 2018-02-26 RX ADMIN — DOCUSATE SODIUM 100 MG: 100 CAPSULE, LIQUID FILLED ORAL at 17:44

## 2018-02-26 RX ADMIN — Medication 300 UNITS: at 14:28

## 2018-02-26 RX ADMIN — OXYCODONE HYDROCHLORIDE AND ACETAMINOPHEN 2 TABLET: 5; 325 TABLET ORAL at 05:09

## 2018-02-26 RX ADMIN — Medication 300 UNITS: at 05:09

## 2018-02-26 RX ADMIN — Medication 300 UNITS: at 21:07

## 2018-02-26 RX ADMIN — DOCUSATE SODIUM 100 MG: 100 CAPSULE, LIQUID FILLED ORAL at 08:05

## 2018-02-26 RX ADMIN — Medication 10 ML: at 21:07

## 2018-02-26 RX ADMIN — Medication 10 ML: at 05:09

## 2018-02-26 RX ADMIN — OXYCODONE HYDROCHLORIDE AND ACETAMINOPHEN 2 TABLET: 5; 325 TABLET ORAL at 14:28

## 2018-02-26 RX ADMIN — VANCOMYCIN HYDROCHLORIDE 1000 MG: 1 INJECTION, POWDER, LYOPHILIZED, FOR SOLUTION INTRAVENOUS at 21:07

## 2018-02-26 NOTE — PROGRESS NOTES
Problem: Mobility Impaired (Adult and Pediatric)  Goal: *Acute Goals and Plan of Care (Insert Text)  LTG:  (1.)Mr. Margaret Perez will perform bed mobility with INDEPENDENCE within 7 treatment day(s). (2.)Mr. Margaret Perez will transfer from bed to chair and chair to bed with MODIFIED INDEPENDENCE using the least restrictive device within 7 treatment day(s). (3.)Mr. Margaret Perez will ambulate with MODIFIED INDEPENDENCE for 150+ feet with the least restrictive device within 7 treatment day(s). (4.)Mr. Margaret Perez will demonstrate GOOD DYNAMIC standing balance utilizing least restrictive assistive device within 7 treatment day(s). (5.)Mr. Margaret Perez will tolerate 25+ minutes of therapeutic activity/exercise while maintaining stable vitals to improve functional strength and endurance within 7 day(s). _______________________________________________________________________________________________  PHYSICAL THERAPY: Daily Note, Treatment Day: 1st, AM 2/26/2018  INPATIENT: Hospital Day: 6  Payor[place] BLUE CR[place] / [place] [facility] / Product Type: PPO /      Erven Grecia in place at all times. NAME/AGE/GENDER: Keren Habermann is a 57 y.o. male   PRIMARY DIAGNOSIS: Open wound of left knee, initial encounter [S81.002A] Septic arthritis of knee, left (HCC) Septic arthritis of knee, left (HCC)  Procedure(s) (LRB):  IRRIGATION AND DEBRIDEMENT LEFT KNEE/ BEING ADMITTED FROM [place] [address] ON 2.20 (Left)  5 Days Post-Op  ICD-10: Treatment Diagnosis:   · Generalized Muscle Weakness (M62.81)  · Difficulty in walking, Not elsewhere classified (R26.2)   Precaution/Allergies:  Review of patient's allergies indicates no known allergies. ASSESSMENT:     Mr. Margaret Perez is a 57year old male admitted with left knee infection and 2 day s/p L knee irigation and debridement. Patient was sitting up on EOB upon contact and agreeable to PT.  Patient able to transfer to standing with CGA and significantly increase gait distance to 250' with use of rolling walker, CGA-SBA, and occasional cues for sequencing with rolling walker. Additional time required for ambulation. Patient was later rolled to therapy gym to participate in group therapeutic strengthening exercises to improve functional strength for transfers, gait and overall mobility. Patient requires cues to perform exercises correctly. Left knee ROM measured at 65 degrees flexion and -10 degrees extension. Overall slow, steady progress towards physical therapy goals. No goals have been met thus far. Will continue efforts. This section established at most recent assessment   PROBLEM LIST (Impairments causing functional limitations):  1. Decreased Strength  2. Decreased ADL/Functional Activities  3. Decreased Transfer Abilities  4. Decreased Ambulation Ability/Technique  5. Decreased Balance  6. Increased Pain  7. Decreased Activity Tolerance  8. Decreased Flexibility/Joint Mobility  9. Decreased Knowledge of Precautions  10. Decreased [place] with Home Exercise Program   INTERVENTIONS PLANNED: (Benefits and precautions of physical therapy have been discussed with the patient.)  1. Balance Exercise  2. Bed Mobility  3. Family Education  4. Gait Training  5. Range of Motion (ROM)  6. Therapeutic Activites  7. Transfer Training  8. Group Therapy     TREATMENT PLAN: Frequency/Duration: 3 times a week for duration of hospital stay  Rehabilitation Potential For Stated Goals: GOOD     RECOMMENDED REHABILITATION/EQUIPMENT: (at time of discharge pending progress): Due to the probability of continued deficits (see above) this patient will likely need continued skilled physical therapy after discharge. Equipment:    Walkers, Type: Rolling Walker              HISTORY:   History of Present Injury/Illness (Reason for Referral):  S/p left knee I&D  Past Medical History/Comorbidities:   Mr. Sudha Angulo  has no past medical history on file.   Mr. Sudha Angulo  has a past surgical history that includes hx orthopaedic. Social History/Living Environment:   Home Environment: Private residence  Wheelchair Ramp: Yes  One/Two Story Residence: One story  Living Alone: Yes  Support Systems: Family member(s)  Patient Expects to be Discharged to[de-identified] Private residence  Current DME Used/Available at Home: Cane, straight  Prior Level of Function/Work/Activity:   Patient lives alone in a single story residence with 0 steps to enter. At baseline, patient is independent with ADLs, ambulates without utilizing an assistive device, and endorses 0 recent falls. Patient with difficult history to fall regarding falls and L LE pain. Number of Personal Factors/Comorbidities that affect the Plan of Care: 1-2: MODERATE COMPLEXITY   EXAMINATION:   Most Recent Physical Functioning:   Gross Assessment:                  Posture:     Balance:  Sitting: Intact  Standing: Impaired  Standing - Static: Good  Standing - Dynamic : Fair Bed Mobility:     Wheelchair Mobility:     Transfers:  Sit to Stand: Contact guard assistance;Stand-by assistance  Stand to Sit: Stand-by assistance  Gait:     Base of Support: Shift to right;Center of gravity altered  Speed/Rosalie: Shuffled; Slow  Step Length: Left shortened;Right shortened  Gait Abnormalities: Decreased step clearance;Trunk sway increased; Shuffling gait  Distance (ft): 250 Feet (ft)  Assistive Device: Walker, rolling  Ambulation - Level of Assistance: Contact guard assistance;Stand-by assistance  Interventions: Safety awareness training;Verbal cues; Tactile cues      Body Structures Involved:  1. Bones  2. Joints  3. Muscles Body Functions Affected:  1. Neuromusculoskeletal  2. Movement Related Activities and Participation Affected:  1. Mobility  2.  Self Care   Number of elements that affect the Plan of Care: 4+: HIGH COMPLEXITY   CLINICAL PRESENTATION:   Presentation: Stable and uncomplicated: LOW COMPLEXITY   CLINICAL DECISION MAKIN [facility] [address] [address] AM-[address] Inpatient Short Form  How much difficulty does the patient currently have. .. Unable A Lot A Little None   1. Turning over in bed (including adjusting bedclothes, sheets and blankets)? [] 1   [] 2   [] 3   [x] 4   2. Sitting down on and standing up from a chair with arms ( e.g., wheelchair, bedside commode, etc.)   [] 1   [] 2   [x] 3   [] 4   3. Moving from lying on back to sitting on the side of the bed? [] 1   [] 2   [x] 3   [] 4   How much help from another person does the patient currently need. .. Total A Lot A Little None   4. Moving to and from a bed to a chair (including a wheelchair)? [] 1   [] 2   [x] 3   [] 4   5. Need to walk in hospital room? [] 1   [] 2   [x] 3   [] 4   6. Climbing 3-5 steps with a railing? [] 1   [x] 2   [] 3   [] 4   © 2007, Trustees of [address] [place] [address], under license to [email or web address]. All rights reserved      Score:  Initial: 18 Most Recent: 18 (Date: 2/23/18 )    Interpretation of Tool:  Represents activities that are increasingly more difficult (i.e. Bed mobility, Transfers, Gait). Score 24 23 22-20 19-15 14-10 9-7 6     Modifier CH CI CJ CK CL CM CN      ? Mobility - Walking and Moving Around:     - CURRENT STATUS: CK - 40%-59% impaired, limited or restricted    - GOAL STATUS: CJ - 20%-39% impaired, limited or restricted    - D/C STATUS:  ---------------To be determined---------------  Payor: BLUE CR[place] / [place] OF [address] [place] [address] Rd / Product Type: PPO /      Medical Necessity:     · Patient demonstrates good rehab potential due to higher previous functional level. Reason for Services/Other Comments:  · Patient continues to require skilled intervention due to decreased functional mobility and balance/gait status from baseline. .   Use of outcome tool(s) and clinical judgement create a POC that gives a: Clear prediction of patient's progress: LOW COMPLEXITY            TREATMENT:   (In addition to Assessment/Re-Assessment sessions the following treatments were rendered)   Pre-treatment Symptoms/Complaints:    Pain: Initial:   Pain Intensity 1: 0  Post Session:  0/10     Therapeutic Activity: (    25 Minutes): Therapeutic activities including transfer training, ambulation on level ground, static/dynamic standing balance activities, instruction in sequencing with rolling walker, scooting, and patient education to improve mobility, strength and balance. Required minimal Safety awareness training;Verbal cues; Tactile cues to promote static and dynamic balance in standing and promote coordination of bilateral, lower extremity(s). Group Therapeutic Exercise: (  10 Minutes):  Exercises per grid below to improve mobility, strength, balance and stiffness/soreness. Required moderate visual, verbal, manual and tactile cues to promote proper body alignment, promote proper body posture and promote proper body mechanics. Progressed range, repetitions and complexity of movement as indicated. Date:  2/26/18 Date:   Date:     ACTIVITY/EXERCISE AM PM AM PM AM PM   Ambulation:           Distance  Device  Duration         Seated Heel Raises x15B A        Seated Toe Raises x15B A        Seated Long Arc Quads x15B A        Seated Marching x15B A        Seated Hip Abduction x15B A        Seated heel slides x10L A        Seated quad sets x10L A        B = bilateral; AA = active assistive; A = active; P = passive        Braces/Orthotics/Lines/Etc:   · IV  · O2 Device: Room air  Treatment/Session Assessment:    · Response to Treatment:  See above. · Interdisciplinary Collaboration:   o Physical Therapy Assistant  o Registered Nurse  · After treatment position/precautions:   o Up in chair  o Bed alarm/tab alert on  o Bed/Chair-wheels locked  o Call light within reach  o RN notified  o All needs within reach   · Compliance with Program/Exercises: Will assess as treatment progresses. · Recommendations/Intent for next treatment session:   \"Next visit will focus on advancements to more challenging activities and reduction in assistance provided\".   Total Treatment Duration:  PT Patient Time In/Time Out  Time In: 0930 (1130)  Time Out: 0955 (1140)    Vitaly Mercer PTA

## 2018-02-26 NOTE — PROGRESS NOTES
Problem: Interdisciplinary Rounds  Goal: Interdisciplinary Rounds  Outcome: Progressing Towards Goal  Interdisciplinary team rounds were held 2/26/2018 with the following team members:Care Management, Occupational Therapy, Physician and . Anticipate discharge to [facility] tomorrow. Plan of care discussed. See clinical pathway and/or care plan for interventions and desired outcomes.

## 2018-02-26 NOTE — PROGRESS NOTES
Hospitalist Progress Note    2018  Admit Date: 2018  9:12 AM   NAME: Genie Tay   :  1955   DOS:              18  MRN:  [id number]   Attending: Saira Voss MD  PCP:  None  Treatment Team: Attending Provider: Marino Guardado. Kelley Odom MD; Physician: Joseline Davis MD; Consulting Provider: Bunny Avila MD; Consulting Provider: Juana Armas MD; Care Manager: Carrolyn Bamberger, LIZ; Consulting Provider: Janina Guallpa MD; Charge Nurse: Elvi Ames RN; Charge Nurse: Narda Wetzel    Full Code     SUBJECTIVE:   As previously documented: \" Mr. Kianna Bonilla a 57 y. o. male who has no significant PMHx who was admitted on 18 for left leg non purulent cellulitis. Of note, he lives in [place] and came to visit family members to [place], [place] on 18. He denied falls, prior knee or leg surgery. Duplex was was unremarkable for DVT. He was started on vancomycin. Has persistent Blood cultures positive for MRSA bacteremia. ECHO negative for vegetations. Due to poor improvement of his thigh and leg edema and pain he underwent an MRI of his leg showing multiloculated abscesses and findings of septic knee. He underwent I&D and knee lavage on 18 by general surgery. Orthopedics performed knee aspiration. So far, all BC and wound aspirate and cultures are positive for staph aureus. ID consulted. Plan for outpatient antibiotic completion at SNF/Rehab and wound care once Blood cultures are negative and he gets a PICC line. Patient still has persistent leukocytosis. He is being transferred to [facility] for further surgical intervention by Orthopedic group- Dr. Consuelo Henderson".           18   Mr. Genie Tay denies any SOB, CP, abdominal pain. Denies any N/V or diarrhea. S/p 2PRBC yesterday  With H/h:9.2/28.7 from 6.7/21.5, although this AM H/H: 7.0/22/3. No obvious signs of bleeding. In IV hydration, tachycardia improving. Still with low grade fevers.  On IV Vancomycin with EOT 3/19/18 .             10+ ROS reviewed and negative except for positive in HPI.    No Known Allergies  Current Facility-Administered Medications   Medication Dose Route Frequency    0.9% sodium chloride infusion 250 mL  250 mL IntraVENous PRN    0.9% sodium chloride infusion 250 mL  250 mL IntraVENous PRN    0.9% sodium chloride infusion  75 mL/hr IntraVENous CONTINUOUS    hydrALAZINE (APRESOLINE) 20 mg/mL injection 10 mg  10 mg IntraVENous Q6H PRN    docusate sodium (COLACE) capsule 100 mg  100 mg Oral BID    lip protectant (BLISTEX) ointment   Topical PRN    sodium chloride (NS) flush 10 mL  10 mL InterCATHeter Q8H    heparin (porcine) pf 300 Units  300 Units InterCATHeter Q8H    sodium chloride (NS) flush 10 mL  10 mL InterCATHeter PRN    heparin (porcine) pf 300 Units  300 Units InterCATHeter PRN    vancomycin (VANCOCIN) 1,000 mg in 0.9% sodium chloride (MBP/ADV) 250 mL  1 g IntraVENous Q8H    acetaminophen (TYLENOL) tablet 650 mg  650 mg Oral Q6H PRN    naloxone (NARCAN) injection 0.4 mg  0.4 mg IntraVENous EVERY 2 MINUTES AS NEEDED    oxyCODONE-acetaminophen (PERCOCET) 5-325 mg per tablet 1 Tab  1 Tab Oral Q4H PRN    oxyCODONE-acetaminophen (PERCOCET) 5-325 mg per tablet 2 Tab  2 Tab Oral Q4H PRN    traMADol (ULTRAM) tablet 50 mg  50 mg Oral Q6H PRN         Immunization History   Administered Date(s) Administered    TB Skin Test (PPD) Intradermal 02/18/2018     Objective:     Patient Vitals for the past 24 hrs:   Temp Pulse Resp BP SpO2   02/26/18 0736 99.2 °F (37.3 °C) 98 20 130/72 97 %   02/26/18 0338 99.7 °F (37.6 °C) 100 20 129/81 98 %   02/25/18 2352 99.8 °F (37.7 °C) (!) 108 18 133/68 98 %   02/25/18 1951 98.1 °F (36.7 °C) (!) 115 20 136/74 97 %   02/25/18 1438 99.7 °F (37.6 °C) (!) 116 18 118/65 96 %   02/25/18 1350 99.1 °F (37.3 °C) (!) 114 16 118/60 99 %   02/25/18 1232 99.4 °F (37.4 °C) (!) 119 20 120/68 100 %   02/25/18 1128 100 °F (37.8 °C) (!) 108 20 136/71 98 %   02/25/18 1036 99.6 °F (37.6 °C) (!) 118 20 125/71 96 %   18 1011 99.2 °F (37.3 °C) (!) 101 16 106/81 94 %     Temp (24hrs), Av.4 °F (37.4 °C), Min:98.1 °F (36.7 °C), Max:100 °F (37.8 °C)    Oxygen Therapy  O2 Sat (%): 97 % (18 0736)  Pulse via Oximetry: 111 beats per minute (18 1700)  O2 Device: Room air (18 0711)  O2 Flow Rate (L/min): 3 l/min (18 1557)  Oxygen Therapy  O2 Sat (%): 97 % (18 0736)  Pulse via Oximetry: 111 beats per minute (18 1700)  O2 Device: Room air (18 0711)  O2 Flow Rate (L/min): 3 l/min (18 1557)    Physical Exam:  General:         Alert, cooperative, no acute  Distress. Low grade fevers   HEENT:               NCAT. No obvious deformity. Nares normal. No drainage  Lungs:         CTABL. No wheezing/rhonchi/rales  Cardiovascular:   RRR. No m/r/g. 2+ pitting pedal edema LLE. Trace edema RLE. . +2 PT/DT pulses b/l. Abdomen:       S/nt/nd. Bowel sounds normal. .   Skin:         No rashes or lesions. Not Jaundiced  Musculoskeletal: L knee with dressing, no discharge. No obvious bleeding. Enlarge left thigh on distal area. Neurologic:    AAOx3.  Gle No gross focal deficit. Moves all extremities. Psychiatric:         Good mood. Normal affect.               DIAGNOSTIC STUDIES      Data Review:   Recent Results (from the past 24 hour(s))   HGB & HCT    Collection Time: 18  2:03 PM   Result Value Ref Range    HGB 8.0 (L) 13.6 - 17.2 g/dL    HCT 25.5 (L) 41.1 - 50.3 %   BNP    Collection Time: 18  2:03 PM   Result Value Ref Range    BNP 12 pg/mL   HGB & HCT    Collection Time: 18 11:55 PM   Result Value Ref Range    HGB 9.2 (L) 13.6 - 17.2 g/dL    HCT 28.7 (L) 41.1 - 50.3 %   CBC WITH AUTOMATED DIFF    Collection Time: 18  8:14 AM   Result Value Ref Range    WBC 12.3 (H) 4.3 - 11.1 K/uL    RBC 2.71 (L) 4.23 - 5.67 M/uL    HGB 7.0 (L) 13.6 - 17.2 g/dL    HCT 22.4 (L) 41.1 - 50.3 %    MCV 82.7 79.6 - 97.8 FL    MCH 25.8 (L) 26.1 - 32.9 PG    MCHC 31.3 (L) 31.4 - 35.0 g/dL    RDW 18.7 (H) 11.9 - 14.6 %    PLATELET 848 (H) 473 - 450 K/uL    MPV 9.2 (L) 10.8 - 14.1 FL    DF AUTOMATED      NEUTROPHILS 71 43 - 78 %    LYMPHOCYTES 16 13 - 44 %    MONOCYTES 9 4.0 - 12.0 %    EOSINOPHILS 3 0.5 - 7.8 %    BASOPHILS 0 0.0 - 2.0 %    IMMATURE GRANULOCYTES 1 0.0 - 5.0 %    ABS. NEUTROPHILS 8.8 (H) 1.7 - 8.2 K/UL    ABS. LYMPHOCYTES 1.9 0.5 - 4.6 K/UL    ABS. MONOCYTES 1.1 0.1 - 1.3 K/UL    ABS. EOSINOPHILS 0.3 0.0 - 0.8 K/UL    ABS. BASOPHILS 0.0 0.0 - 0.2 K/UL    ABS. IMM. GRANS. 0.1 0.0 - 0.5 K/UL       All Micro Results     None          Imaging /Procedures /Studies:    MRI LLE 2/16/18 FINDINGS:   There are multiple fluid collections within the deep muscles of the  left leg particularly along the anterior and lateral thigh. One of these  components measures approximately 3.0 x 6.5 x 4.0 cm. There are smaller  components below the knee medially extending to the upper to mid calf. There is  a large joint effusion with peripheral enhancement. There is enhancement about  the joint space. There is marrow edema associated with the medial compartment  which is relatively symmetric to the contralateral side, more likely to be  degenerative rather than osteomyelitis. There is extensive muscular edema. There  is a large joint effusion on the right as well although postcontrast imaging of  this knee was not performed. IMPRESSION:  1. Multiloculated fluid collections within the left lower extremity most  compatible with abscesses. A large joint effusion is present with enhancement as  well as enhancement within the joint space therefore concerning for septic  arthritis. There are no definite findings of osteomyelitis. 2. Large right joint effusion. Echocardiogram 2/14/18: SUMMARY:  -  Left ventricle: Systolic function was normal. Ejection fraction was  estimated in the range of 55 % to 60 %. There were no regional wall motion  abnormalities.  There was mild concentric hypertrophy. The E/e' ratio was 8.3.  -  Pericardium: There was no pericardial effusion. Labs and Studies from previous 24 hours have been personally reviewed by myself [address] Problems as of 2/26/2018  Date Reviewed: 2/20/2018          Codes Class Noted - Resolved POA    * (Principal)Septic arthritis of knee, left ([address] ICD-10-CM: M00.9  ICD-9-CM: 711.06  2/21/2018 - Present Yes        MRSA bacteremia ICD-10-CM: R78.81  ICD-9-CM: 790.7, 041.12  2/21/2018 - Present Yes        Leukocytosis ICD-10-CM: D72.829  ICD-9-CM: 288.60  2/13/2018 - Present Yes        Hypoalbuminemia ICD-10-CM: E88.09  ICD-9-CM: 273.8  2/13/2018 - Present Yes        Anemia ICD-10-CM: D64.9  ICD-9-CM: 285.9  2/13/2018 - Present Yes        Left leg cellulitis ICD-10-CM: L03.116  ICD-9-CM: 682.6  2/13/2018 - Present Yes        Abnormal transaminases ICD-10-CM: R74.8  ICD-9-CM: 790.4  2/13/2018 - Present Yes              Plan:  L knee septic arthritis with MRSA bacteremia and leg cellulitis:  - aspirated on 2/16/18 and s/p I&D 2/21/18  - On IV vancomycin with EOT:3/19/18  - BC 2/2 from 2/19 negative   -TTE and MANJINDER  w/o vegetations. Iron deficiency anemia:  - on Iron  supplementation  -  Hemoccult collection pending  - s/p 2PRBC yesterday - H/H again lower today. Repeat H/H to be sure is not a lab error. Check INR/PT/PTT - if confirmed with transfuse again 2 PRBCs. - serial h/H  - worried regarding possibility of bleeding in his thigh - will check US LLE and retroperitoneal US to r/o hematoma  - Heparin SQ on hold    Elevated liver enzymes:  - resolved - unclear etiology  - acute hepatitis panel negative     Debility :  -PT/OT/PPD/IRC  - will need rehab - [facility] vs [facility] vs SNF      DVT Prophylaxis: Heparin SQ  CODE Status: Full CODE  Plan of Care Discussed with: patient.  Care team.  Medical Risk: high  Disposition: pending    Beth Brar MD  02/26/18

## 2018-02-26 NOTE — PROGRESS NOTES
PM&R Consult Progress Note      Patient: Briana Seymour  Admit Date: 2/21/2018  Admit Diagnosis: Open wound of left knee, initial encounter [S81.002A]  Recommendations: Continue Acute Rehab Program, Coordination of rehab/medical care, Counseling of PM & R care issues management, Hospital Inpatient Rehab, Subacute Rehab  IR[facility] vs SNF; as stated previously, I feel that he would benefit from [facility] due to closer medical management of post op anemia (w/u underway); pain mgt, observance and management of wound, monitoring of IV abx/leukocytosis, hx MRSA bacteremia with recent blood cx neg. He is actually mobilizing much better with PT and may be too functional for insurance to precert( ambulated 776KM with CGA and a RW) ; he is open to SNF. I do not think he meets LTACH/[facility] criteria.   -cont pt/ot  -US of retroperitoneum pending; ? Source of blood loss. History/Subjective/Complaint:     Patient seen and examined. Records reviewed. Pain 4/10.  No fever or chills    Pain 1  Pain Scale 1: Visual (02/26/18 0930)  Pain Intensity 1: 0 (02/26/18 0930)  Patient Stated Pain Goal: 0 (02/26/18 0502)  Pain Reassessment 1: Yes (02/25/18 2230)  Pain Onset 1: post op (02/26/18 0745)  Pain Location 1: Leg (02/26/18 0745)  Pain Orientation 1: Left (02/26/18 0745)  Pain Description 1: Aching (02/26/18 0745)  Pain Intervention(s) 1: Repositioned (02/26/18 0745)     Objective:     Vitals:  Patient Vitals for the past 8 hrs:   BP Temp Pulse Resp SpO2   02/26/18 0736 130/72 99.2 °F (37.3 °C) 98 20 97 %   02/26/18 0338 129/81 99.7 °F (37.6 °C) 100 20 98 %      Intake and Output:  02/24 1901 - 02/26 0700  In: -   Out: 3400 [Urine:3400]    No Known Allergies  Current Facility-Administered Medications   Medication Dose Route Frequency    0.9% sodium chloride infusion 250 mL  250 mL IntraVENous PRN    0.9% sodium chloride infusion  75 mL/hr IntraVENous CONTINUOUS    hydrALAZINE (APRESOLINE) 20 mg/mL injection 10 mg  10 mg IntraVENous Q6H PRN  docusate sodium (COLACE) capsule 100 mg  100 mg Oral BID    lip protectant (BLISTEX) ointment   Topical PRN    sodium chloride (NS) flush 10 mL  10 mL InterCATHeter Q8H    heparin (porcine) pf 300 Units  300 Units InterCATHeter Q8H    sodium chloride (NS) flush 10 mL  10 mL InterCATHeter PRN    heparin (porcine) pf 300 Units  300 Units InterCATHeter PRN    vancomycin (VANCOCIN) 1,000 mg in 0.9% sodium chloride (MBP/ADV) 250 mL  1 g IntraVENous Q8H    acetaminophen (TYLENOL) tablet 650 mg  650 mg Oral Q6H PRN    naloxone (NARCAN) injection 0.4 mg  0.4 mg IntraVENous EVERY 2 MINUTES AS NEEDED    oxyCODONE-acetaminophen (PERCOCET) 5-325 mg per tablet 1 Tab  1 Tab Oral Q4H PRN    oxyCODONE-acetaminophen (PERCOCET) 5-325 mg per tablet 2 Tab  2 Tab Oral Q4H PRN    traMADol (ULTRAM) tablet 50 mg  50 mg Oral Q6H PRN       Physical Exam:  Awake and alert. NAD  CV rrr no m  LUNGS cta b  ABD soft ntnd bs+  EXT edema to LLE/thigh;knee, 1+ at ankle  KI on, locked in extension; distal pulses 2+    Incision(s)/Wound(s): Wound Leg Left (Active)   DRESSING STATUS Clean, dry, and intact 2/26/2018 12:08 AM   DRESSING TYPE 4 x 4;Transparent film 2/26/2018 12:08 AM   Incision site well approximated? Yes 2/23/2018  9:03 AM   Drainage Amount  None 2/24/2018  8:28 AM   Drainage Color Purulent 2/23/2018  9:03 AM   Wound Odor None 2/23/2018  9:03 AM   Periwound Skin Condition Intact 2/23/2018  9:03 AM   Dressing Type Applied 4 x 4;Transparent film 2/23/2018  9:03 AM   Procedure Tolerated Well 2/23/2018  9:03 AM   Number of days:5       [REMOVED] Wound Leg Left (Removed)   Removed 02/21/18    DRESSING STATUS Clean, dry, and intact 2/24/2018  8:28 AM   DRESSING TYPE Transparent film;4 x 4 2/24/2018  8:28 AM   Incision site well approximated?  No 2/19/2018  1:53 PM   Non-Pressure Injury Full thickness (subcut/muscle) 2/19/2018  1:53 PM   Wound Length (cm) 19.2 cm 2/19/2018  1:53 PM   Wound Width (cm) 7 cm 2/19/2018  1:53 PM Wound Depth (cm) 5 2/19/2018  1:53 PM   Wound Surface area (cm^2) 134.4 cm^2 2/19/2018  1:53 PM   Condition of Base Pink;Muscle exposed 2/19/2018  1:53 PM   Condition of Edges Open 2/19/2018  1:53 PM   Tissue Type Percent Pink 100 2/19/2018  1:53 PM   Direction of Tunnels 9    oclock 2/19/2018  1:53 PM   Depth of Tunnel/Sinus (cm) 5 cm 2/19/2018  1:53 PM   Drainage Amount  None 2/24/2018  8:28 AM   Drainage Color Serosanguinous 2/19/2018  1:53 PM   Wound Odor None 2/19/2018  1:53 PM   Periwound Skin Condition Intact 2/19/2018  1:53 PM   Cleansing and Cleansing Agents  Normal saline 2/19/2018  1:53 PM   Dressing Type Applied Negative pressure wound therapy 2/19/2018  1:53 PM   Procedure Tolerated Well 2/19/2018  1:53 PM   Number of days:4          Functional Assessment:  Gross Assessment  AROM: Generally decreased, functional (L LE) (02/23/18 1031)  Strength: Generally decreased, functional (L LE) (02/23/18 1031)  Coordination: Within functional limits (02/23/18 1031)  Sensation: Intact (Light touch B LE) (02/23/18 1031)     Gait  Base of Support: Shift to right;Center of gravity altered (02/26/18 1000)  Speed/Rosalie: Shuffled; Slow (02/26/18 1000)  Step Length: Left shortened;Right shortened (02/26/18 1000)  Swing Pattern: Right asymmetrical (02/23/18 1031)  Stance: Left decreased;Right increased (02/23/18 1031)  Gait Abnormalities: Decreased step clearance;Trunk sway increased; Shuffling gait (02/26/18 1000)  Ambulation - Level of Assistance: Contact guard assistance;Stand-by assistance (02/26/18 1000)  Distance (ft): 250 Feet (ft) (02/26/18 1000)  Assistive Device: Walker, rolling (02/26/18 1000)  Interventions: Safety awareness training;Verbal cues; Tactile cues (02/26/18 1000)     Bed Mobility  Rolling: Contact guard assistance (02/23/18 1000)  Supine to Sit: Contact guard assistance;Minimum assistance (02/25/18 0981)  Scooting: Contact guard assistance (02/23/18 1031)     Balance  Sitting: Intact (02/26/18 1000)  Standing: Impaired (02/26/18 1000)  Standing - Static: Good (02/26/18 1000)  Standing - Dynamic : Fair (02/26/18 1000)     Grooming  Washing Face: Supervision/set-up (02/25/18 0945)  Brushing Teeth: Supervision/set-up (02/25/18 0945)                 Bed/Mat Mobility  Rolling: Contact guard assistance (02/23/18 1000)  Supine to Sit: Contact guard assistance;Minimum assistance (02/25/18 0945)  Sit to Stand: Contact guard assistance;Stand-by assistance (02/26/18 1000)  Bed to Chair: Stand-by assistance;Contact guard assistance (02/25/18 0945)  Scooting: Contact guard assistance (02/23/18 1031)     Labs/Studies:  Recent Results (from the past 72 hour(s))   VANCOMYCIN, TROUGH    Collection Time: 02/23/18 11:20 PM   Result Value Ref Range    Vancomycin,trough 17.1 5 - 20 ug/mL   CBC W/O DIFF    Collection Time: 02/24/18  6:02 AM   Result Value Ref Range    WBC 13.4 (H) 4.3 - 11.1 K/uL    RBC 2.98 (L) 4.23 - 5.67 M/uL    HGB 7.6 (L) 13.6 - 17.2 g/dL    HCT 24.7 (L) 41.1 - 50.3 %    MCV 82.9 79.6 - 97.8 FL    MCH 25.5 (L) 26.1 - 32.9 PG    MCHC 30.8 (L) 31.4 - 35.0 g/dL    RDW 18.6 (H) 11.9 - 14.6 %    PLATELET 778 (H) 998 - 450 K/uL    MPV 9.3 (L) 10.8 - 14.1 FL   SED RATE, AUTOMATED    Collection Time: 02/24/18  2:53 PM   Result Value Ref Range    Sed rate, automated 37 (H) 0 - 20 mm/hr   C REACTIVE PROTEIN, QT    Collection Time: 02/24/18  2:53 PM   Result Value Ref Range    C-Reactive protein 11.0 (H) 0.0 - 0.9 mg/dL   CBC WITH AUTOMATED DIFF    Collection Time: 02/25/18  5:17 AM   Result Value Ref Range    WBC 12.4 (H) 4.3 - 11.1 K/uL    RBC 2.60 (L) 4.23 - 5.67 M/uL    HGB 6.7 (LL) 13.6 - 17.2 g/dL    HCT 21.5 (L) 41.1 - 50.3 %    MCV 82.7 79.6 - 97.8 FL    MCH 25.8 (L) 26.1 - 32.9 PG    MCHC 31.2 (L) 31.4 - 35.0 g/dL    RDW 18.8 (H) 11.9 - 14.6 %    PLATELET 032 (H) 660 - 450 K/uL    MPV 8.8 (L) 10.8 - 14.1 FL    DF AUTOMATED      NEUTROPHILS 67 43 - 78 %    LYMPHOCYTES 20 13 - 44 %    MONOCYTES 9 4.0 - 12.0 % EOSINOPHILS 3 0.5 - 7.8 %    BASOPHILS 0 0.0 - 2.0 %    IMMATURE GRANULOCYTES 1 0.0 - 5.0 %    ABS. NEUTROPHILS 8.2 1.7 - 8.2 K/UL    ABS. LYMPHOCYTES 2.5 0.5 - 4.6 K/UL    ABS. MONOCYTES 1.1 0.1 - 1.3 K/UL    ABS. EOSINOPHILS 0.4 0.0 - 0.8 K/UL    ABS. BASOPHILS 0.0 0.0 - 0.2 K/UL    ABS. IMM.  GRANS. 0.1 0.0 - 0.5 K/UL   METABOLIC PANEL, BASIC    Collection Time: 02/25/18  5:17 AM   Result Value Ref Range    Sodium 139 136 - 145 mmol/L    Potassium 3.8 3.5 - 5.1 mmol/L    Chloride 104 98 - 107 mmol/L    CO2 27 21 - 32 mmol/L    Anion gap 8 7 - 16 mmol/L    Glucose 108 (H) 65 - 100 mg/dL    BUN 14 8 - 23 MG/DL    Creatinine 0.99 0.8 - 1.5 MG/DL    GFR est AA >60 >60 ml/min/1.73m2    GFR est non-AA >60 >60 ml/min/1.73m2    Calcium 9.4 8.3 - 10.4 MG/DL   HAPTOGLOBIN    Collection Time: 02/25/18  5:17 AM   Result Value Ref Range    Haptoglobin 373 (H) 30 - 200 mg/dL   TYPE + CROSSMATCH    Collection Time: 02/25/18  7:34 AM   Result Value Ref Range    Crossmatch Expiration 02/28/2018     ABO/Rh(D) O POSITIVE     Antibody screen NEG     Unit number [id number]     Blood component type [facility]     Unit division 00     Status of unit TRANSFUSED     Crossmatch result Compatible     Unit number [id number]     Blood component type [facility]     Unit division 00     Status of unit ALLOCATED     Crossmatch result Compatible     Unit number [id number]     Blood component type [facility]     Unit division 00     Status of unit ALLOCATED     Crossmatch result Compatible    HGB & HCT    Collection Time: 02/25/18  2:03 PM   Result Value Ref Range    HGB 8.0 (L) 13.6 - 17.2 g/dL    HCT 25.5 (L) 41.1 - 50.3 %   BNP    Collection Time: 02/25/18  2:03 PM   Result Value Ref Range    BNP 12 pg/mL   HGB & HCT    Collection Time: 02/25/18 11:55 PM   Result Value Ref Range    HGB 9.2 (L) 13.6 - 17.2 g/dL    HCT 28.7 (L) 41.1 - 50.3 %   CBC WITH AUTOMATED DIFF    Collection Time: 02/26/18  8:14 AM   Result Value Ref Range    WBC 12.3 (H) 4.3 - 11.1 K/uL    RBC 2.71 (L) 4.23 - 5.67 M/uL    HGB 7.0 (L) 13.6 - 17.2 g/dL    HCT 22.4 (L) 41.1 - 50.3 %    MCV 82.7 79.6 - 97.8 FL    MCH 25.8 (L) 26.1 - 32.9 PG    MCHC 31.3 (L) 31.4 - 35.0 g/dL    RDW 18.7 (H) 11.9 - 14.6 %    PLATELET 562 (H) 126 - 450 K/uL    MPV 9.2 (L) 10.8 - 14.1 FL    DF AUTOMATED      NEUTROPHILS 71 43 - 78 %    LYMPHOCYTES 16 13 - 44 %    MONOCYTES 9 4.0 - 12.0 %    EOSINOPHILS 3 0.5 - 7.8 %    BASOPHILS 0 0.0 - 2.0 %    IMMATURE GRANULOCYTES 1 0.0 - 5.0 %    ABS. NEUTROPHILS 8.8 (H) 1.7 - 8.2 K/UL    ABS. LYMPHOCYTES 1.9 0.5 - 4.6 K/UL    ABS. MONOCYTES 1.1 0.1 - 1.3 K/UL    ABS. EOSINOPHILS 0.3 0.0 - 0.8 K/UL    ABS. BASOPHILS 0.0 0.0 - 0.2 K/UL    ABS. IMM.  GRANS. 0.1 0.0 - 0.5 K/UL   HGB & HCT    Collection Time: 02/26/18  8:57 AM   Result Value Ref Range    HGB 7.6 (L) 13.6 - 17.2 g/dL    HCT 24.1 (L) 41.1 - 50.3 %        Assessment:     Principal Problem:    Septic arthritis of knee, left (HCC) (2/21/2018)    Active Problems:    Leukocytosis (2/13/2018)      Hypoalbuminemia (2/13/2018)      Anemia (2/13/2018)      Left leg cellulitis (2/13/2018)      Abnormal transaminases (2/13/2018)      MRSA bacteremia (2/21/2018)        Plan:     Recommendations: Continue Acute Rehab Program  Coordination of rehab/medical care  Counseling of PM & R care issues management  Monitoring and management of medical conditions per plan of care/orders  Discussion with Family/Caregiver/Staff  Reviewed Therapies/Labs/Medications/Records    Signed By:  Cher Tomas MD     February 26, 2018

## 2018-02-26 NOTE — PROGRESS NOTES
Infectious Disease Progress Note    Today's Date: 2018   Admit Date: 2018    Impression:   · MRSA bacteremia ( - ); repeat cultures  NG; TTE  negative for vegetation, MANJINDER NG  · MRSA pyomyositis left leg  · MRSA left knee septic arthritis, aspirated 18, s/p I&D   · Recent lumbar spine surgery with hardware, 10/2017 (in [place])  · Elevated LFTs - hep panel negative; ? NAFLD  · Microcytic anemia with elevated ferritin - likely Chronic disease anemia or inflammatory anemia  · Thrombocytosis    Plan:   · Continue IV vancomycin (currently on 1g Q8hrs: may need frequency adjustment for SNF)  · Duration: 4 weeks, EOT 3/19/18  · Dispo: SNF vs [facility] vs [facility], pending    Anti-infectives:   IV vancomycin    Subjective:     Up to the chair getting bath. Left leg/knee painful, but he is eager for PT. Denies nausea, vomiting, diarrhea, fever, chills, or sweats    No Known Allergies     Review of Systems:  Pertinent items are noted in the History of Present Illness.     Objective:     Visit Vitals    /72 (BP 1 Location: Left arm, BP Patient Position: At rest)    Pulse 98    Temp 99.2 °F (37.3 °C)    Resp 20    Ht 5' 8\" (1.727 m)    Wt 113.4 kg (250 lb)    SpO2 97%    BMI 38.01 kg/m2     Temp (24hrs), Av.4 °F (37.4 °C), Min:98.1 °F (36.7 °C), Max:100 °F (37.8 °C)       Lines:   RUE PICC intact        Physical Exam:    General:  Alert, cooperative, obese   Eyes:  Sclera anicteric   Mouth/Throat: oral pharynx clear   Neck: Supple   Lungs:   CTA, room air   CV:  Tachycardic, without audible murmur   Abdomen:   non-distended, normoactive BS   Extremities: LLE edema   Skin: No rashes/hives   Musculoskeletal: LLE edematous, tegaderm/giaze dressings in place, C/D/I   Lines/Devices:  Intact, no erythema, drainage or tenderness   Psych: Alert, oriented x3     Data Review:     CBC:  Recent Labs      18   0857  18   0814  18   2355   18   0517  18   0602   WBC   -- 12.3*   --    --   12.4*  13.4*   GRANS   --   71   --    --   67   --    MONOS   --   9   --    --   9   --    EOS   --   3   --    --   3   --    ANEU   --   8.8*   --    --   8.2   --    ABL   --   1.9   --    --   2.5   --    HGB  7.6*  7.0*  9.2*   < >  6.7*  7.6*   HCT  24.1*  22.4*  28.7*   < >  21.5*  24.7*   PLT   --   562*   --    --   562*  672*    < > = values in this interval not displayed. BMP:  Recent Labs      18   0517   CREA  0.99   BUN  14   NA  139   K  3.8   CL  104   CO2  27   AGAP  8   GLU  108*       LFTS:  No results for input(s): TBILI, ALT, SGOT, AP, TP, ALB in the last 72 hours. Microbiology:     All Micro Results     None          Imagin/15/18 L knee film: IMPRESSION:  1. Large joint effusion and soft tissue edema without acute osseous abnormality. An infectious/inflammatory process of the left knee joint cannot be excluded  given the appearance. Recommend clinical correlation. 18 MRI left leg  IMPRESSION:  1. Multiloculated fluid collections within the left lower extremity most compatible with abscesses. A large joint effusion is present with enhancement as well as enhancement within the joint space therefore concerning for septic arthritis. There are no definite findings of osteomyelitis. 2. Large right joint effusion.     Signed By: Satya Frank NP     2018

## 2018-02-26 NOTE — PROGRESS NOTES
Problem: Self Care Deficits Care Plan (Adult)  Goal: *Acute Goals and Plan of Care (Insert Text)  1. Patient will complete functional transfers with supervision with WBAT status in LLE with knee imobilizer in place and with adaptive equipment as needed. 3. Patient will complete lower body bathing and dressing with minimal assistance and adaptive equipment as needed. 4. Patient will complete toileting and toilet transfer with supervision. 5. Patient will tolerate 30 minutes of OT treatment with 2-3 rest breaks to increase activity tolerance for ADLs. 6. Patient will participate in at least 15 minutes of BUE therapeutic exercises to strengthen BUE for functional transfers. Timeframe: 7 visits         OCCUPATIONAL THERAPY: Daily Note, Treatment Day: 2nd and AM 2/26/2018  INPATIENT: Hospital Day: 6  Payor: Howie Euceda / Plan: [place] [facility] / Product Type: PPO /      NAME/AGE/GENDER: Keren Habermann is a 57 y.o. male   PRIMARY DIAGNOSIS:  Open wound of left knee, initial encounter [S81.002A] Septic arthritis of knee, left (HCC) Septic arthritis of knee, left (HCC)  Procedure(s) (LRB):  IRRIGATION AND DEBRIDEMENT LEFT KNEE/ BEING ADMITTED FROM [place] [address] ON 2.20 (Left)  5 Days Post-Op  ICD-10: Treatment Diagnosis:    · Pain in Left Knee (M25.562)  · Stiffness of Left Knee, Not elsewhere classified (M25.662)  · Generalized Muscle Weakness (M62.81)  · Other lack of cordination (R27.8)  · History of falling (Z91.81)   Precautions/Allergies:     Review of patient's allergies indicates no known allergies. ASSESSMENT:   Mr. Margaret Perez was admitted for the above diagnosis. Pt was brought to therapy gym to participate in group exercises (in grid below) to increase UE strength and activity tolerance to perform mobility and ADLs. Pt required visual and verbal cueing to complete exercises. Pt tolerated session well. Returned to room by recliner. Pt was later observed donning/doffing socks as well.  Pt does well with ADLs. Progressing with all goals above. Will continue to benefit from skilled OT during stay. This section established at most recent assessment   PROBLEM LIST (Impairments causing functional limitations):  1. Decreased Strength  2. Decreased ADL/Functional Activities  3. Decreased Transfer Abilities  4. Decreased Ambulation Ability/Technique  5. Decreased Balance  6. Increased Pain  7. Decreased Activity Tolerance  8. Decreased Flexibility/Joint Mobility  9. Edema/Girth  10. Decreased Knowledge of Precautions  11. Decreased [place] with Home Exercise Program   INTERVENTIONS PLANNED: (Benefits and precautions of occupational therapy have been discussed with the patient.)  1. Activities of daily living training  2. Adaptive equipment training  3. Balance training  4. Clothing management  5. Donning&doffing training  6. Group therapy  7. Neuromuscular re-eduation  8. Therapeutic activity  9. Therapeutic exercise     TREATMENT PLAN: Frequency/Duration: Follow patient 3 times per week to address above goals. Rehabilitation Potential For Stated Goals: Excellent     RECOMMENDED REHABILITATION/EQUIPMENT: (at time of discharge pending progress): Due to the probability of continued deficits (see above) this patient will likely need continued skilled occupational therapy after discharge. Equipment:    TBD              OCCUPATIONAL PROFILE AND HISTORY:   History of Present Injury/Illness (Reason for Referral):  See H&P  Past Medical History/Comorbidities:   Mr. Sonal Bah  has no past medical history on file. Mr. Sonal Bah  has a past surgical history that includes hx orthopaedic.   Social History/Living Environment:   Home Environment: Private residence  Wheelchair Ramp: Yes  One/Two Story Residence: One story  Living Alone: Yes  Support Systems: Family member(s)  Patient Expects to be Discharged to[de-identified] Private residence  Current DME Used/Available at Home: Cane, straight  Prior Level of Function/Work/Activity:  Pt reports that he lives in one story home with ramp to enter in [place]. Pt states he completes functional mobility/ADL with independence. Pt does have a cane and rolling walker. Pt state that his wife passed away but unclear if he lives alone or if he is remarried. Personal Factors:          Past/Current Experience:  Infection of knee with recent I &D        Overall Behavior:  Demanding; lacks some awareness of need for safety/assistance          Number of Personal Factors/Comorbidities that affect the Plan of Care: Expanded review of therapy/medical records (1-2):  MODERATE COMPLEXITY   ASSESSMENT OF OCCUPATIONAL PERFORMANCE[de-identified]   Activities of Daily Living:           Basic ADLs (From Assessment) Complex ADLs (From Assessment)   Basic ADL  Feeding: Independent  Oral Facial Hygiene/Grooming: Stand-by assistance  Bathing: Moderate assistance  Upper Body Dressing: Stand-by assistance  Lower Body Dressing: Maximum assistance  Toileting: Moderate assistance Instrumental ADL  Meal Preparation: Maximum assistance  Homemaking: Total assistance   Grooming/Bathing/Dressing Activities of Daily Living                           Lower Body Dressing Assistance  Socks: Supervision/set-up  Leg Crossed Method Used: No  Position Performed: Seated in chair  Cues: Doff; Don Bed/Mat Mobility  Sit to Stand: Contact guard assistance;Stand-by assistance       Most Recent Physical Functioning:   Gross Assessment:                  Posture:  Posture (WDL): Exceptions to WDL  Balance:  Sitting: Intact  Standing: Impaired  Standing - Static: Good  Standing - Dynamic : Fair Bed Mobility:     Wheelchair Mobility:     Transfers:  Sit to Stand: Contact guard assistance;Stand-by assistance  Stand to Sit: Stand-by assistance                Patient Vitals for the past 6 hrs:   BP BP Patient Position SpO2 Pulse   02/26/18 0736 130/72 At rest 97 % 98   02/26/18 1118 125/74 Sitting 95 % (!) 106   02/26/18 1215 128/71 - 95 % (!) 102   02/26/18 1234 137/80 - 98 % 90       Mental Status  Neurologic State: Alert, Appropriate for age  Orientation Level: Oriented X4  Cognition: Follows commands  Perception: Appears intact  Perseveration: No perseveration noted  Safety/Judgement: Awareness of environment                          Physical Skills Involved:  1. Range of Motion  2. Balance  3. Strength  4. Activity Tolerance  5. Sensation  6. Pain (acute)  7. Edema Cognitive Skills Affected (resulting in the inability to perform in a timely and safe manner):  1. [facility] Psychosocial Skills Affected:  1. Habits/Routines  2. Social Interaction  3. Self-Awareness   Number of elements that affect the Plan of Care: 5+:  HIGH COMPLEXITY   CLINICAL DECISION MAKIN[address] [place] [address] AM-PAC 6 Clicks   Daily Activity Inpatient Short Form  How much help from another person does the patient currently need. .. Total A Lot A Little None   1. Putting on and taking off regular lower body clothing? [] 1   [x] 2   [] 3   [] 4   2. Bathing (including washing, rinsing, drying)? [] 1   [x] 2   [] 3   [] 4   3. Toileting, which includes using toilet, bedpan or urinal?   [] 1   [x] 2   [] 3   [] 4   4. Putting on and taking off regular upper body clothing? [] 1   [] 2   [x] 3   [] 4   5. Taking care of personal grooming such as brushing teeth? [] 1   [] 2   [x] 3   [] 4   6. Eating meals? [] 1   [] 2   [x] 3   [] 4   © , Trustees of [address] [place] [address], under license to [employer]. All rights reserved      Score:  Initial: 15 Most Recent: X (Date: -- )    Interpretation of Tool:  Represents activities that are increasingly more difficult (i.e. Bed mobility, Transfers, Gait). Score 24 23 22-20 19-15 14-10 9-7 6     Modifier CH CI CJ CK CL CM CN      ?  Self Care:     - CURRENT STATUS: CK - 40%-59% impaired, limited or restricted    - GOAL STATUS: CJ - 20%-39% impaired, limited or restricted    - D/C STATUS:  ---------------To be determined---------------  Payor: BLUE CROSS / Plan: [facility] / Product Type: PPO /      Medical Necessity:     · Patient demonstrates excellent rehab potential due to higher previous functional level. Reason for Services/Other Comments:  · Patient continues to require skilled intervention due to decreased independence with ADL/functional transfers. Use of outcome tool(s) and clinical judgement create a POC that gives a: LOW COMPLEXITY         TREATMENT:   (In addition to Assessment/Re-Assessment sessions the following treatments were rendered)     Pre-treatment Symptoms/Complaints:    Pain: Initial:   Pain Intensity 1: 0  Post Session:  3/10     Group Therapeutic Exercise: (10 minutes):  Exercises per grid below to improve mobility, strength and activity tolerance. Required minimal visual and verbal cues to promote proper body alignment and promote proper body mechanics. Progressed resistance and repetitions as indicated. UE Exercises (with red theraband and 5# dowel) Date:  2/26/2018 Date:   Date:     Activity/Exercise Parameters Parameters Parameters   Shoulder Abd/Adduction 20 reps with band     Shoulder Flexion 15 reps with band     Elbow Flexion 25 reps with dowel     Punches  20 reps with dowel                         Braces/Orthotics/Lines/Etc:   · IV  · O2 Device: Room air  Treatment/Session Assessment:    · Response to Treatment: Tolerated well   · Interdisciplinary Collaboration:   o Certified Occupational Therapy Assistant  o Registered Nurse  · After treatment position/precautions:   o Up in chair  o Bed alarm/tab alert on  o Bed/Chair-wheels locked  o Bed in low position  o Call light within reach   · Compliance with Program/Exercises: Will assess as treatment progresses. · Recommendations/Intent for next treatment session: \"Next visit will focus on advancements to more challenging activities and reduction in assistance provided\".   Total Treatment Duration:  OT Patient Time In/Time Out  Time In: 1120  Time Out: [address] [place]

## 2018-02-26 NOTE — PROGRESS NOTES
Problem: Falls - Risk of  Goal: *Absence of Falls  Document Kin[place] Fall Risk and appropriate interventions in the flowsheet.    Outcome: Progressing Towards Goal  Fall Risk Interventions:  Mobility Interventions: Bed/chair exit alarm, Communicate number of staff needed for ambulation/transfer, OT consult for ADLs, PT Consult for mobility concerns, PT Consult for assist device competence, Utilize walker, cane, or other assitive device, Patient to call before getting OOB    Mentation Interventions: Adequate sleep, hydration, pain control, Bed/chair exit alarm, Door open when patient unattended, Evaluate medications/consider consulting pharmacy, Eyeglasses and hearing aids, Familiar objects from home, Family/sitter at bedside, Gait belt with transfers/ambulation, Increase mobility, More frequent rounding, Reorient patient, Room close to nurse's station, Self-releasing belt, Toileting rounds, Update white board    Medication Interventions: Assess postural VS orthostatic hypotension, Bed/chair exit alarm, Evaluate medications/consider consulting pharmacy, Patient to call before getting OOB    Elimination Interventions: Bed/chair exit alarm, Call light in reach, Patient to call for help with toileting needs, Toileting schedule/hourly rounds, Urinal in reach

## 2018-02-27 LAB
ABO + RH BLD: NORMAL
ANION GAP SERPL CALC-SCNC: 9 MMOL/L (ref 7–16)
BLD PROD TYP BPU: NORMAL
BLOOD GROUP ANTIBODIES SERPL: NORMAL
BPU ID: NORMAL
BUN SERPL-MCNC: 11 MG/DL (ref 8–23)
CALCIUM SERPL-MCNC: 10.1 MG/DL (ref 8.3–10.4)
CHLORIDE SERPL-SCNC: 105 MMOL/L (ref 98–107)
CO2 SERPL-SCNC: 25 MMOL/L (ref 21–32)
CREAT SERPL-MCNC: 1 MG/DL (ref 0.8–1.5)
CROSSMATCH RESULT,%XM: NORMAL
ERYTHROCYTE [DISTWIDTH] IN BLOOD BY AUTOMATED COUNT: 18 % (ref 11.9–14.6)
GLUCOSE SERPL-MCNC: 93 MG/DL (ref 65–100)
HCT VFR BLD AUTO: 26.5 % (ref 41.1–50.3)
HCT VFR BLD AUTO: 27.5 % (ref 41.1–50.3)
HCT VFR BLD AUTO: 29.3 % (ref 41.1–50.3)
HGB BLD-MCNC: 8.2 G/DL (ref 13.6–17.2)
HGB BLD-MCNC: 9 G/DL (ref 13.6–17.2)
HGB BLD-MCNC: 9.4 G/DL (ref 13.6–17.2)
MCH RBC QN AUTO: 26.8 PG (ref 26.1–32.9)
MCHC RBC AUTO-ENTMCNC: 32.1 G/DL (ref 31.4–35)
MCV RBC AUTO: 83.5 FL (ref 79.6–97.8)
PLATELET # BLD AUTO: 538 K/UL (ref 150–450)
PMV BLD AUTO: 9.1 FL (ref 10.8–14.1)
POTASSIUM SERPL-SCNC: 3.9 MMOL/L (ref 3.5–5.1)
RBC # BLD AUTO: 3.51 M/UL (ref 4.23–5.67)
SODIUM SERPL-SCNC: 139 MMOL/L (ref 136–145)
SPECIMEN EXP DATE BLD: NORMAL
STATUS OF UNIT,%ST: NORMAL
UNIT DIVISION, %UDIV: 0
WBC # BLD AUTO: 11.8 K/UL (ref 4.3–11.1)

## 2018-02-27 PROCEDURE — 97530 THERAPEUTIC ACTIVITIES: CPT

## 2018-02-27 PROCEDURE — 74011250637 HC RX REV CODE- 250/637: Performed by: FAMILY MEDICINE

## 2018-02-27 PROCEDURE — 74011250636 HC RX REV CODE- 250/636: Performed by: INTERNAL MEDICINE

## 2018-02-27 PROCEDURE — 85027 COMPLETE CBC AUTOMATED: CPT | Performed by: HOSPITALIST

## 2018-02-27 PROCEDURE — 74011250636 HC RX REV CODE- 250/636: Performed by: NURSE PRACTITIONER

## 2018-02-27 PROCEDURE — 65270000029 HC RM PRIVATE

## 2018-02-27 PROCEDURE — 97150 GROUP THERAPEUTIC PROCEDURES: CPT

## 2018-02-27 PROCEDURE — 99232 SBSQ HOSP IP/OBS MODERATE 35: CPT | Performed by: PHYSICAL MEDICINE & REHABILITATION

## 2018-02-27 PROCEDURE — 80048 BASIC METABOLIC PNL TOTAL CA: CPT | Performed by: HOSPITALIST

## 2018-02-27 PROCEDURE — 85018 HEMOGLOBIN: CPT | Performed by: HOSPITALIST

## 2018-02-27 RX ORDER — TRAMADOL HYDROCHLORIDE 50 MG/1
50 TABLET ORAL
Qty: 30 TAB | Refills: 1 | Status: SHIPPED | OUTPATIENT
Start: 2018-02-27

## 2018-02-27 RX ADMIN — Medication 300 UNITS: at 20:34

## 2018-02-27 RX ADMIN — OXYCODONE HYDROCHLORIDE AND ACETAMINOPHEN 2 TABLET: 5; 325 TABLET ORAL at 02:40

## 2018-02-27 RX ADMIN — OXYCODONE HYDROCHLORIDE AND ACETAMINOPHEN 2 TABLET: 5; 325 TABLET ORAL at 18:09

## 2018-02-27 RX ADMIN — VANCOMYCIN HYDROCHLORIDE 1000 MG: 1 INJECTION, POWDER, LYOPHILIZED, FOR SOLUTION INTRAVENOUS at 20:33

## 2018-02-27 RX ADMIN — Medication 10 ML: at 13:26

## 2018-02-27 RX ADMIN — Medication 10 ML: at 20:34

## 2018-02-27 RX ADMIN — OXYCODONE HYDROCHLORIDE AND ACETAMINOPHEN 2 TABLET: 5; 325 TABLET ORAL at 13:26

## 2018-02-27 RX ADMIN — Medication 300 UNITS: at 13:26

## 2018-02-27 RX ADMIN — Medication 10 ML: at 06:02

## 2018-02-27 RX ADMIN — DOCUSATE SODIUM 100 MG: 100 CAPSULE, LIQUID FILLED ORAL at 08:44

## 2018-02-27 RX ADMIN — OXYCODONE HYDROCHLORIDE AND ACETAMINOPHEN 2 TABLET: 5; 325 TABLET ORAL at 08:44

## 2018-02-27 RX ADMIN — VANCOMYCIN HYDROCHLORIDE 1000 MG: 1 INJECTION, POWDER, LYOPHILIZED, FOR SOLUTION INTRAVENOUS at 12:17

## 2018-02-27 RX ADMIN — OXYCODONE HYDROCHLORIDE AND ACETAMINOPHEN 2 TABLET: 5; 325 TABLET ORAL at 21:56

## 2018-02-27 RX ADMIN — Medication 300 UNITS: at 06:02

## 2018-02-27 RX ADMIN — VANCOMYCIN HYDROCHLORIDE 1000 MG: 1 INJECTION, POWDER, LYOPHILIZED, FOR SOLUTION INTRAVENOUS at 02:31

## 2018-02-27 RX ADMIN — DOCUSATE SODIUM 100 MG: 100 CAPSULE, LIQUID FILLED ORAL at 18:04

## 2018-02-27 NOTE — H&P
[facility] Consult Note       Primary GI Physician: Zi Gregg MD ([facility]). He has a GI MD in [place], [place]. Referring Physician: Dr. Dot aLwton    Consult Date:  2/27/2018  Admit Date:  2/21/2018    Chief Complaint:  Anemia    Subjective:     History of Present Illness:  Patient is a 57 y.o. male who denies any significant PMHx who is seen in consultation at the request of Dr. Dot Lawton for further evaluation of anemia. Patient was admitted on 2/13/18 for left leg cellulitis. He lives in [address] [place] [address] and was visiting family here. Presenting Hgb on 2/13 was 8.5 with MCV of 78. Hgb dropped to 6.7 on 2/25, got a total of 3u PRBCs on 2/25-2/26, and Hgb improved and remains stable in the 9s. He had a pellet size BM 2 days ago. No hematochezia or melena. Denies any prior hx of GIB or anemia. No hx of PUD. Denies any recent NSAIDs use. Nonsmoker. No GERD or abdominal pain. Reports having a poorly prepped colonoscopy last year in [place]. Scheduled to have one another this year. Reports having a normal colonoscopy 10 yrs ago. No FHx of colon CA or polyps. Infectious disease on board. They suspect microcytic anemia with elevated ferritin to be chronic disease anemia or inflammatory anemia. PMH:  Denies any chronic medical illnesses. PSH:  Past Surgical History:   Procedure Laterality Date    HX ORTHOPAEDIC      back       Allergies:  No Known Allergies    Home Medications:  Prior to Admission medications    Medication Sig Start Date End Date Taking? Authorizing Provider   acetaminophen-codeine (TYLENOL-CODEINE #3) 300-30 mg per tablet Take 1 Tab by mouth every four (4) hours as needed for Pain. Sylvia Sifuentes MD   cyclobenzaprine (FLEXERIL) 5 mg tablet Take 5 mg by mouth. Sylvia Sifuentes MD   gabapentin (NEURONTIN) 300 mg capsule Take 300 mg by mouth three (3) times daily.     Sylvia Sifuentes MD       Hospital Medications:  Current Facility-Administered Medications   Medication Dose Route Frequency    0.9% sodium chloride infusion 250 mL  250 mL IntraVENous PRN    hydrALAZINE (APRESOLINE) 20 mg/mL injection 10 mg  10 mg IntraVENous Q6H PRN    docusate sodium (COLACE) capsule 100 mg  100 mg Oral BID    lip protectant (BLISTEX) ointment   Topical PRN    sodium chloride (NS) flush 10 mL  10 mL InterCATHeter Q8H    heparin (porcine) pf 300 Units  300 Units InterCATHeter Q8H    sodium chloride (NS) flush 10 mL  10 mL InterCATHeter PRN    heparin (porcine) pf 300 Units  300 Units InterCATHeter PRN    vancomycin (VANCOCIN) 1,000 mg in 0.9% sodium chloride (MBP/ADV) 250 mL  1 g IntraVENous Q8H    acetaminophen (TYLENOL) tablet 650 mg  650 mg Oral Q6H PRN    naloxone (NARCAN) injection 0.4 mg  0.4 mg IntraVENous EVERY 2 MINUTES AS NEEDED    oxyCODONE-acetaminophen (PERCOCET) 5-325 mg per tablet 1 Tab  1 Tab Oral Q4H PRN    oxyCODONE-acetaminophen (PERCOCET) 5-325 mg per tablet 2 Tab  2 Tab Oral Q4H PRN    traMADol (ULTRAM) tablet 50 mg  50 mg Oral Q6H PRN       Social History:  Social History   Substance Use Topics    Smoking status: Never Smoker    Smokeless tobacco: Never Used    Alcohol use No         Family History:  Negative for colon CA or polyps    Review of Systems:  A detailed 10 system ROS is obtained, with pertinent positives as listed above. All others are negative. Diet:  NPO    Objective:     Physical Exam:  Vitals:  Visit Vitals    BP (!) 139/94 (BP 1 Location: Left arm, BP Patient Position: At rest)    Pulse 99    Temp 98.6 °F (37 °C)    Resp 20    Ht 5' 8\" (1.727 m)    Wt 113.4 kg (250 lb)    SpO2 98%    BMI 38.01 kg/m2     Gen:  Pt is alert, cooperative, no acute distress  Skin:  Extremities and face reveal no rashes. HEENT: Sclerae anicteric. Extra-occular muscles are intact. No oral ulcers. No abnormal pigmentation of the lips. The neck is supple. Cardiovascular: Regular rate and rhythm. No murmurs, gallops, or rubs.   Respiratory:  Comfortable breathing with no accessory muscle use. Clear breath sounds anteriorly with no wheezes, rales, or rhonchi. GI:  Abdomen nondistended, soft, and nontender. Normal active bowel sounds. No enlargement of the liver or spleen. No masses palpable. Rectal:  Deferred  Musculoskeletal:  LLE edema noted. Neurological:  Gross memory appears intact. Patient is alert and oriented. Psychiatric:  Mood appears appropriate with judgement intact. Lymphatic:  No cervical or supraclavicular adenopathy. Laboratory:    Recent Labs      02/27/18   7415  02/27/18   0135  02/26/18   0857  02/26/18   0814  02/25/18   2355  02/25/18   1403  02/25/18   0517   WBC  11.8*   --    --   12.3*   --    --   12.4*   HGB  9.4*  9.0*  7.6*  7.0*  9.2*  8.0*  6.7*   HCT  29.3*  27.5*  24.1*  22.4*  28.7*  25.5*  21.5*   PLT  538*   --    --   562*   --    --   562*   MCV  83.5   --    --   82.7   --    --   82.7   NA  139   --    --    --    --    --   139   K  3.9   --    --    --    --    --   3.8   CL  105   --    --    --    --    --   104   CO2  25   --    --    --    --    --   27   BUN  11   --    --    --    --    --   14   CREA  1.00   --    --    --    --    --   0.99   CA  10.1   --    --    --    --    --   9.4   GLU  93   --    --    --    --    --   108*   PTP   --    --   14.0   --    --    --    --    INR   --    --   1.1   --    --    --    --    APTT   --    --   26.8   --    --    --    --           Assessment:     Principal Problem:    Septic arthritis of knee, left (HCC) (2/21/2018)    Active Problems:    Leukocytosis (2/13/2018)      Hypoalbuminemia (2/13/2018)      Anemia (2/13/2018)      Left leg cellulitis (2/13/2018)      Abnormal transaminases (2/13/2018)      MRSA bacteremia (2/21/2018)    Generally healthy 58 y/o M without any chronic medically illnesses who was admitted on 2/13 for left leg cellulitis. GI was consulted for further evaluation of microcytic anemia.  He responded well to blood transfusion and Hgb remains stable in the 9s. No overt or active bleeding. Agree with ID, microcytic anemia with elevated ferritin to be chronic disease anemia or inflammatory anemia. Colonoscopy hx as above. Plan:     Given that his Hgb is stable and there is no evidence of active GI bleed, we have no plans for inpatient procedures. We will sign-off. Please call if patient demonstrates overt or active bleeding. Otherwise, patient has plans to FU with his GP and GI MDs once he returns home to [place]. Dionna Aguilar PA-C    Patient is seen and examined in collaboration with Dr. Mery Siegel. Assessment and plan as per Dr. Eli Quintanilla. I have seen and examined the patient, and I have directed and agreed to the plan as described. He has had recurrent anemia requiring transfusion, but there is no evidence of GI bleeding, and there are no GI symptoms. We do not suspect clinically significant GI bleeding simply based on a drop in hgb, even if he were to be hemoccult positive. We have no plans for this patient and will sign off. Please call if there is overt GI bleeding.     Julissa Crain MD

## 2018-02-27 NOTE — PROGRESS NOTES
PM&R Consult Progress Note      Patient: Home Moreno  Admit Date: 2/21/2018  Admit Diagnosis: Open wound of left knee, initial encounter [S81.002A]  Recommendations: Continue Acute Rehab Program, Coordination of rehab/medical care, Counseling of PM & R care issues management, Subacute Rehab  Pts insurance has DENIED admission to [facility]; this will need a SNF to complete q8hr IV abx. He is mobilizing very well and is cga/ sba ambulating 250ft as well as with transfers using a RW. History/Subjective/Complaint:     Patient seen and examined.      Pain 1  Pain Scale 1: Visual (02/27/18 0955)  Pain Intensity 1: 0 (02/27/18 0955)  Patient Stated Pain Goal: 0 (02/27/18 0242)  Pain Reassessment 1: Yes (02/27/18 0939)  Pain Onset 1: post op (02/27/18 0800)  Pain Location 1: Leg (02/27/18 0800)  Pain Orientation 1: Left (02/27/18 0800)  Pain Description 1: Aching (02/27/18 0800)  Pain Intervention(s) 1: Medication (see MAR) (02/27/18 0800)     Objective:     Vitals:  Patient Vitals for the past 8 hrs:   BP Temp Pulse Resp SpO2   02/27/18 1200 133/85 98.9 °F (37.2 °C) 98 20 100 %   02/27/18 0756 (!) 139/94 98.6 °F (37 °C) 99 20 98 %      Intake and Output:  02/25 1901 - 02/27 0700  In: -   Out: 2975 [Urine:2975]    No Known Allergies  Current Facility-Administered Medications   Medication Dose Route Frequency    [START ON 2/28/2018] Vancomycin Trough Reminder   Other ONCE    0.9% sodium chloride infusion 250 mL  250 mL IntraVENous PRN    hydrALAZINE (APRESOLINE) 20 mg/mL injection 10 mg  10 mg IntraVENous Q6H PRN    docusate sodium (COLACE) capsule 100 mg  100 mg Oral BID    lip protectant (BLISTEX) ointment   Topical PRN    sodium chloride (NS) flush 10 mL  10 mL InterCATHeter Q8H    heparin (porcine) pf 300 Units  300 Units InterCATHeter Q8H    sodium chloride (NS) flush 10 mL  10 mL InterCATHeter PRN    heparin (porcine) pf 300 Units  300 Units InterCATHeter PRN    vancomycin (VANCOCIN) 1,000 mg in 0.9% sodium chloride (MBP/ADV) 250 mL  1 g IntraVENous Q8H    acetaminophen (TYLENOL) tablet 650 mg  650 mg Oral Q6H PRN    naloxone (NARCAN) injection 0.4 mg  0.4 mg IntraVENous EVERY 2 MINUTES AS NEEDED    oxyCODONE-acetaminophen (PERCOCET) 5-325 mg per tablet 1 Tab  1 Tab Oral Q4H PRN    oxyCODONE-acetaminophen (PERCOCET) 5-325 mg per tablet 2 Tab  2 Tab Oral Q4H PRN    traMADol (ULTRAM) tablet 50 mg  50 mg Oral Q6H PRN       Physical Exam:  No significant changes    Incision(s)/Wound(s): Wound Leg Left (Active)   DRESSING STATUS Clean, dry, and intact 2/27/2018  8:00 AM   DRESSING TYPE 4 x 4;Transparent film 2/27/2018  8:00 AM   Incision site well approximated? Yes 2/23/2018  9:03 AM   Drainage Amount  None 2/24/2018  8:28 AM   Drainage Color Purulent 2/23/2018  9:03 AM   Wound Odor None 2/23/2018  9:03 AM   Periwound Skin Condition Intact 2/23/2018  9:03 AM   Dressing Type Applied 4 x 4;Transparent film 2/23/2018  9:03 AM   Procedure Tolerated Well 2/23/2018  9:03 AM   Number of days:6       [REMOVED] Wound Leg Left (Removed)   Removed 02/21/18    DRESSING STATUS Clean, dry, and intact 2/24/2018  8:28 AM   DRESSING TYPE Transparent film;4 x 4 2/24/2018  8:28 AM   Incision site well approximated?  No 2/19/2018  1:53 PM   Non-Pressure Injury Full thickness (subcut/muscle) 2/19/2018  1:53 PM   Wound Length (cm) 19.2 cm 2/19/2018  1:53 PM   Wound Width (cm) 7 cm 2/19/2018  1:53 PM   Wound Depth (cm) 5 2/19/2018  1:53 PM   Wound Surface area (cm^2) 134.4 cm^2 2/19/2018  1:53 PM   Condition of Base Pink;Muscle exposed 2/19/2018  1:53 PM   Condition of Edges Open 2/19/2018  1:53 PM   Tissue Type Percent Pink 100 2/19/2018  1:53 PM   Direction of Tunnels 9    oclock 2/19/2018  1:53 PM   Depth of Tunnel/Sinus (cm) 5 cm 2/19/2018  1:53 PM   Drainage Amount  None 2/24/2018  8:28 AM   Drainage Color Serosanguinous 2/19/2018  1:53 PM   Wound Odor None 2/19/2018  1:53 PM   Periwound Skin Condition Intact 2/19/2018  1:53 PM Cleansing and Cleansing Agents  Normal saline 2/19/2018  1:53 PM   Dressing Type Applied Negative pressure wound therapy 2/19/2018  1:53 PM   Procedure Tolerated Well 2/19/2018  1:53 PM   Number of days:4          Functional Assessment:  Gross Assessment  AROM: Generally decreased, functional (L LE) (02/23/18 1031)  Strength: Generally decreased, functional (L LE) (02/23/18 1031)  Coordination: Within functional limits (02/23/18 1031)  Sensation: Intact (Light touch B LE) (02/23/18 1031)     Gait  Base of Support: Shift to right (02/27/18 1200)  Speed/Rosalie: Slow (02/27/18 1200)  Step Length: Right shortened;Left shortened (02/27/18 1200)  Swing Pattern: Right asymmetrical (02/23/18 1031)  Stance: Left decreased;Right increased (02/23/18 1031)  Gait Abnormalities: Decreased step clearance;Trunk sway increased (02/27/18 1200)  Ambulation - Level of Assistance: Contact guard assistance;Stand-by assistance (02/27/18 1200)  Distance (ft): 250 Feet (ft) (02/27/18 1200)  Assistive Device: Walker, rolling (02/27/18 1200)  Interventions: Safety awareness training; Tactile cues; Verbal cues (02/27/18 1200)     Bed Mobility  Rolling: Contact guard assistance (02/23/18 1000)  Supine to Sit: Contact guard assistance;Minimum assistance (02/25/18 0945)  Scooting: Contact guard assistance (02/23/18 1031)     Balance  Sitting: Intact (02/27/18 1200)  Standing: Impaired (02/27/18 1200)  Standing - Static: Good (02/27/18 1200)  Standing - Dynamic : Fair (02/27/18 1200)     Grooming  Washing Face: Supervision/set-up (02/25/18 0945)  Brushing Teeth: Supervision/set-up (02/25/18 0945)                 Bed/Mat Mobility  Rolling: Contact guard assistance (02/23/18 1000)  Supine to Sit: Contact guard assistance;Minimum assistance (02/25/18 0945)  Sit to Stand: Contact guard assistance;Stand-by assistance (02/27/18 1200)  Bed to Chair: Stand-by assistance;Contact guard assistance (02/25/18 0945)  Scooting: Contact guard assistance (02/23/18 1031)     Labs/Studies:  Recent Results (from the past 72 hour(s))   SED RATE, AUTOMATED    Collection Time: 02/24/18  2:53 PM   Result Value Ref Range    Sed rate, automated 37 (H) 0 - 20 mm/hr   C REACTIVE PROTEIN, QT    Collection Time: 02/24/18  2:53 PM   Result Value Ref Range    C-Reactive protein 11.0 (H) 0.0 - 0.9 mg/dL   CBC WITH AUTOMATED DIFF    Collection Time: 02/25/18  5:17 AM   Result Value Ref Range    WBC 12.4 (H) 4.3 - 11.1 K/uL    RBC 2.60 (L) 4.23 - 5.67 M/uL    HGB 6.7 (LL) 13.6 - 17.2 g/dL    HCT 21.5 (L) 41.1 - 50.3 %    MCV 82.7 79.6 - 97.8 FL    MCH 25.8 (L) 26.1 - 32.9 PG    MCHC 31.2 (L) 31.4 - 35.0 g/dL    RDW 18.8 (H) 11.9 - 14.6 %    PLATELET 483 (H) 478 - 450 K/uL    MPV 8.8 (L) 10.8 - 14.1 FL    DF AUTOMATED      NEUTROPHILS 67 43 - 78 %    LYMPHOCYTES 20 13 - 44 %    MONOCYTES 9 4.0 - 12.0 %    EOSINOPHILS 3 0.5 - 7.8 %    BASOPHILS 0 0.0 - 2.0 %    IMMATURE GRANULOCYTES 1 0.0 - 5.0 %    ABS. NEUTROPHILS 8.2 1.7 - 8.2 K/UL    ABS. LYMPHOCYTES 2.5 0.5 - 4.6 K/UL    ABS. MONOCYTES 1.1 0.1 - 1.3 K/UL    ABS. EOSINOPHILS 0.4 0.0 - 0.8 K/UL    ABS. BASOPHILS 0.0 0.0 - 0.2 K/UL    ABS. IMM.  GRANS. 0.1 0.0 - 0.5 K/UL   METABOLIC PANEL, BASIC    Collection Time: 02/25/18  5:17 AM   Result Value Ref Range    Sodium 139 136 - 145 mmol/L    Potassium 3.8 3.5 - 5.1 mmol/L    Chloride 104 98 - 107 mmol/L    CO2 27 21 - 32 mmol/L    Anion gap 8 7 - 16 mmol/L    Glucose 108 (H) 65 - 100 mg/dL    BUN 14 8 - 23 MG/DL    Creatinine 0.99 0.8 - 1.5 MG/DL    GFR est AA >60 >60 ml/min/1.73m2    GFR est non-AA >60 >60 ml/min/1.73m2    Calcium 9.4 8.3 - 10.4 MG/DL   HAPTOGLOBIN    Collection Time: 02/25/18  5:17 AM   Result Value Ref Range    Haptoglobin 373 (H) 30 - 200 mg/dL   TYPE + CROSSMATCH    Collection Time: 02/25/18  7:34 AM   Result Value Ref Range    Crossmatch Expiration 02/28/2018     ABO/Rh(D) O POSITIVE     Antibody screen NEG     Unit number [id number]     Blood component type [facility]     Unit division 00     Status of unit TRANSFUSED     Crossmatch result Compatible     Unit number [id number]     Blood component type [facility]     Unit division 00     Status of unit TRANSFUSED     Crossmatch result Compatible     Unit number [id number]     Blood component type [facility]     Unit division 00     Status of unit TRANSFUSED     Crossmatch result Compatible    HGB & HCT    Collection Time: 02/25/18  2:03 PM   Result Value Ref Range    HGB 8.0 (L) 13.6 - 17.2 g/dL    HCT 25.5 (L) 41.1 - 50.3 %   BNP    Collection Time: 02/25/18  2:03 PM   Result Value Ref Range    BNP 12 pg/mL   HGB & HCT    Collection Time: 02/25/18 11:55 PM   Result Value Ref Range    HGB 9.2 (L) 13.6 - 17.2 g/dL    HCT 28.7 (L) 41.1 - 50.3 %   CBC WITH AUTOMATED DIFF    Collection Time: 02/26/18  8:14 AM   Result Value Ref Range    WBC 12.3 (H) 4.3 - 11.1 K/uL    RBC 2.71 (L) 4.23 - 5.67 M/uL    HGB 7.0 (L) 13.6 - 17.2 g/dL    HCT 22.4 (L) 41.1 - 50.3 %    MCV 82.7 79.6 - 97.8 FL    MCH 25.8 (L) 26.1 - 32.9 PG    MCHC 31.3 (L) 31.4 - 35.0 g/dL    RDW 18.7 (H) 11.9 - 14.6 %    PLATELET 015 (H) 043 - 450 K/uL    MPV 9.2 (L) 10.8 - 14.1 FL    DF AUTOMATED      NEUTROPHILS 71 43 - 78 %    LYMPHOCYTES 16 13 - 44 %    MONOCYTES 9 4.0 - 12.0 %    EOSINOPHILS 3 0.5 - 7.8 %    BASOPHILS 0 0.0 - 2.0 %    IMMATURE GRANULOCYTES 1 0.0 - 5.0 %    ABS. NEUTROPHILS 8.8 (H) 1.7 - 8.2 K/UL    ABS. LYMPHOCYTES 1.9 0.5 - 4.6 K/UL    ABS. MONOCYTES 1.1 0.1 - 1.3 K/UL    ABS. EOSINOPHILS 0.3 0.0 - 0.8 K/UL    ABS. BASOPHILS 0.0 0.0 - 0.2 K/UL    ABS. IMM.  GRANS. 0.1 0.0 - 0.5 K/UL   HGB & HCT    Collection Time: 02/26/18  8:57 AM   Result Value Ref Range    HGB 7.6 (L) 13.6 - 17.2 g/dL    HCT 24.1 (L) 41.1 - 50.3 %   PROTHROMBIN TIME + INR    Collection Time: 02/26/18  8:57 AM   Result Value Ref Range    Prothrombin time 14.0 11.5 - 14.5 sec    INR 1.1     PTT    Collection Time: 02/26/18  8:57 AM   Result Value Ref Range    aPTT 26.8 23.2 - 35.3 [address]   URINALYSIS W/ RFLX MICROSCOPIC    Collection Time: 02/26/18 12:32 PM   Result Value Ref Range    Color YELLOW      Appearance CLEAR      Specific gravity 1.014 1.001 - 1.023      pH (UA) 5.5 5.0 - 9.0      Protein NEGATIVE  NEG mg/dL    Glucose NEGATIVE  mg/dL    Ketone NEGATIVE  NEG mg/dL    Bilirubin NEGATIVE  NEG      Blood NEGATIVE  NEG      Urobilinogen 2.0 (H) 0.2 - 1.0 EU/dL    Nitrites NEGATIVE  NEG      Leukocyte Esterase NEGATIVE  NEG     HGB & HCT    Collection Time: 02/27/18  1:35 AM   Result Value Ref Range    HGB 9.0 (L) 13.6 - 17.2 g/dL    HCT 27.5 (L) 41.1 - 50.3 %   CBC W/O DIFF    Collection Time: 02/27/18  6:24 AM   Result Value Ref Range    WBC 11.8 (H) 4.3 - 11.1 K/uL    RBC 3.51 (L) 4.23 - 5.67 M/uL    HGB 9.4 (L) 13.6 - 17.2 g/dL    HCT 29.3 (L) 41.1 - 50.3 %    MCV 83.5 79.6 - 97.8 FL    MCH 26.8 26.1 - 32.9 PG    MCHC 32.1 31.4 - 35.0 g/dL    RDW 18.0 (H) 11.9 - 14.6 %    PLATELET 145 (H) 682 - 450 K/uL    MPV 9.1 (L) 10.8 - 86.7 FL   METABOLIC PANEL, BASIC    Collection Time: 02/27/18  6:24 AM   Result Value Ref Range    Sodium 139 136 - 145 mmol/L    Potassium 3.9 3.5 - 5.1 mmol/L    Chloride 105 98 - 107 mmol/L    CO2 25 21 - 32 mmol/L    Anion gap 9 7 - 16 mmol/L    Glucose 93 65 - 100 mg/dL    BUN 11 8 - 23 MG/DL    Creatinine 1.00 0.8 - 1.5 MG/DL    GFR est AA >60 >60 ml/min/1.73m2    GFR est non-AA >60 >60 ml/min/1.73m2    Calcium 10.1 8.3 - 10.4 MG/DL        Assessment:     Principal Problem:    Septic arthritis of knee, left (HCC) (2/21/2018)    Active Problems:    Leukocytosis (2/13/2018)      Hypoalbuminemia (2/13/2018)      Anemia (2/13/2018)      Left leg cellulitis (2/13/2018)      Abnormal transaminases (2/13/2018)      MRSA bacteremia (2/21/2018)        Plan:     Recommendations: Continue Acute Rehab Program  Coordination of rehab/medical care  Counseling of PM & R care issues management  Monitoring and management of medical conditions per plan of care/orders  Discussion with Family/Caregiver/Staff  Reviewed Therapies/Labs/Medications/Records    Signed By:  Logan Miranda MD     February 27, 2018

## 2018-02-27 NOTE — PROGRESS NOTES
Spoke with Riley Peters from [address] [place] [address] and she states she will have someone see patient in the morning but patient could also go ahead and transfer to 9 for rehab and consult could be done from there.

## 2018-02-27 NOTE — PROGRESS NOTES
Problem: Falls - Risk of  Goal: *Absence of Falls  Document Kinjal Fall Risk and appropriate interventions in the flowsheet.    Outcome: Progressing Towards Goal  Fall Risk Interventions:  Mobility Interventions: Bed/chair exit alarm, OT consult for ADLs, PT Consult for mobility concerns, PT Consult for assist device competence, Patient to call before getting OOB    Mentation Interventions: Adequate sleep, hydration, pain control, Bed/chair exit alarm, Door open when patient unattended, Evaluate medications/consider consulting pharmacy, Eyeglasses and hearing aids, Familiar objects from home, Family/sitter at bedside, Gait belt with transfers/ambulation, Increase mobility, More frequent rounding, Reorient patient, Room close to nurse's station, Self-releasing belt, Toileting rounds, Update white board    Medication Interventions: Bed/chair exit alarm, Evaluate medications/consider consulting pharmacy, Patient to call before getting OOB    Elimination Interventions: Call light in reach, Bed/chair exit alarm, Patient to call for help with toileting needs

## 2018-02-27 NOTE — PROGRESS NOTES
Hospitalist Progress Note    2018  Admit Date: 2018  9:12 AM   NAME: Genie Tay   :  1955   DOS:              18  MRN:  [id number]   Attending: Navneet James MD  PCP:  None  Treatment Team: Attending Provider: Marino Guardado. Kelley Odom MD; Physician: Joseline Davis MD; Consulting Provider: Bunny Avila MD; Consulting Provider: Juana Armas MD; Care Manager: Carrolyn Bamberger, LIZ; Consulting Provider: Janina Guallpa MD; Charge Nurse: Elvi Ames RN; Charge Nurse: Narda Wetzel    Full Code     SUBJECTIVE:   As previously documented: \" Mr. Kianna Bonilla a 57 y. o. male who has no significant PMHx who was admitted on 18 for left leg non purulent cellulitis. Of note, he lives in [place] and came to visit family members to [place], [place] on 18. He denied falls, prior knee or leg surgery. Duplex was was unremarkable for DVT. He was started on vancomycin. Has persistent Blood cultures positive for MRSA bacteremia. ECHO negative for vegetations. Due to poor improvement of his thigh and leg edema and pain he underwent an MRI of his leg showing multiloculated abscesses and findings of septic knee. He underwent I&D and knee lavage on 18 by general surgery. Orthopedics performed knee aspiration. So far, all BC and wound aspirate and cultures are positive for staph aureus. ID consulted. Plan for outpatient antibiotic completion at SNF/Rehab and wound care once Blood cultures are negative and he gets a PICC line. Patient still has persistent leukocytosis. He is being transferred to [facility] for further surgical intervention by Orthopedic group- Dr. Consuelo Henderson".           18   Mr. Genie Tay  Is a 58 yo male with multiloculated abscesses and septic knee s/p lavage on vancomycin. He was ambulating well this AM and reports that his knee pain is well controlled. He denies any chest pain or dyspnea. 10+ ROS reviewed and negative except for positive in HPI. No Known Allergies  Current Facility-Administered Medications   Medication Dose Route Frequency    0.9% sodium chloride infusion 250 mL  250 mL IntraVENous PRN    hydrALAZINE (APRESOLINE) 20 mg/mL injection 10 mg  10 mg IntraVENous Q6H PRN    docusate sodium (COLACE) capsule 100 mg  100 mg Oral BID    lip protectant (BLISTEX) ointment   Topical PRN    sodium chloride (NS) flush 10 mL  10 mL InterCATHeter Q8H    heparin (porcine) pf 300 Units  300 Units InterCATHeter Q8H    sodium chloride (NS) flush 10 mL  10 mL InterCATHeter PRN    heparin (porcine) pf 300 Units  300 Units InterCATHeter PRN    vancomycin (VANCOCIN) 1,000 mg in 0.9% sodium chloride (MBP/ADV) 250 mL  1 g IntraVENous Q8H    acetaminophen (TYLENOL) tablet 650 mg  650 mg Oral Q6H PRN    naloxone (NARCAN) injection 0.4 mg  0.4 mg IntraVENous EVERY 2 MINUTES AS NEEDED    oxyCODONE-acetaminophen (PERCOCET) 5-325 mg per tablet 1 Tab  1 Tab Oral Q4H PRN    oxyCODONE-acetaminophen (PERCOCET) 5-325 mg per tablet 2 Tab  2 Tab Oral Q4H PRN    traMADol (ULTRAM) tablet 50 mg  50 mg Oral Q6H PRN         Immunization History   Administered Date(s) Administered    TB Skin Test (PPD) Intradermal 02/18/2018     Objective:     Patient Vitals for the past 24 hrs:   Temp Pulse Resp BP SpO2   02/27/18 0756 98.6 °F (37 °C) 99 20 (!) 139/94 98 %   02/27/18 0330 100 °F (37.8 °C) 98 20 150/85 90 %   02/26/18 2308 97.8 °F (36.6 °C) 99 20 155/90 97 %   02/26/18 2106 99.4 °F (37.4 °C) 97 20 149/86 94 %   02/26/18 2033 99.8 °F (37.7 °C) (!) 107 17 163/81 97 %   02/26/18 1920 99.8 °F (37.7 °C) (!) 111 18 (!) 158/91 96 %   02/26/18 1823 99.4 °F (37.4 °C) (!) 110 20 144/85 100 %   02/26/18 1747 99.6 °F (37.6 °C) (!) 105 20 147/84 98 %   02/26/18 1544 99.3 °F (37.4 °C) 97 18 134/69 96 %   02/26/18 1420 99.9 °F (37.7 °C) (!) 107 18 139/90 -   02/26/18 1314 99.4 °F (37.4 °C) 90 18 144/82 -   02/26/18 1234 99.3 °F (37.4 °C) 90 18 137/80 98 %   02/26/18 1215 99.2 °F (37.3 °C) (!) 102 20 128/71 95 %   18 1118 99.2 °F (37.3 °C) (!) 106 20 125/74 95 %     Temp (24hrs), Av.3 °F (37.4 °C), Min:97.8 °F (36.6 °C), Max:100 °F (37.8 °C)    Oxygen Therapy  O2 Sat (%): 98 % (18 0756)  Pulse via Oximetry: 111 beats per minute (18 1700)  O2 Device: Room air (18 0711)  O2 Flow Rate (L/min): 3 l/min (18 1557)  Oxygen Therapy  O2 Sat (%): 98 % (18 0756)  Pulse via Oximetry: 111 beats per minute (18 1700)  O2 Device: Room air (18 0711)  O2 Flow Rate (L/min): 3 l/min (18 1557)    Physical Exam:  General:         Alert, cooperative, no acute  Distress. Low grade fevers   HEENT:               NCAT. No obvious deformity. Nares normal. No drainage  Lungs:         CTABL. No wheezing/rhonchi/rales  Cardiovascular:   RRR. No m/r/g. 2+ pitting pedal edema LLE. Trace edema RLE. . +2 PT/DT pulses b/l. Abdomen:       S/nt/nd. Bowel sounds normal. .   Skin:         No rashes or lesions. Not Jaundiced  Musculoskeletal: L knee with dressing, no discharge. No obvious bleeding. Enlarge left thigh on distal area. Neurologic:    AAOx3. Valeen Ivans No gross focal deficit. Moves all extremities. Psychiatric:         Good mood. Normal affect.               DIAGNOSTIC STUDIES      Data Review:   Recent Results (from the past 24 hour(s))   URINALYSIS W/ RFLX MICROSCOPIC    Collection Time: 18 12:32 PM   Result Value Ref Range    Color YELLOW      Appearance CLEAR      Specific gravity 1.014 1.001 - 1.023      pH (UA) 5.5 5.0 - 9.0      Protein NEGATIVE  NEG mg/dL    Glucose NEGATIVE  mg/dL    Ketone NEGATIVE  NEG mg/dL    Bilirubin NEGATIVE  NEG      Blood NEGATIVE  NEG      Urobilinogen 2.0 (H) 0.2 - 1.0 EU/dL    Nitrites NEGATIVE  NEG      Leukocyte Esterase NEGATIVE  NEG     HGB & HCT    Collection Time: 18  1:35 AM   Result Value Ref Range    HGB 9.0 (L) 13.6 - 17.2 g/dL    HCT 27.5 (L) 41.1 - 50.3 %   CBC W/O DIFF    Collection Time: 18  6:24 AM   Result Value Ref Range    WBC 11.8 (H) 4.3 - 11.1 K/uL    RBC 3.51 (L) 4.23 - 5.67 M/uL    HGB 9.4 (L) 13.6 - 17.2 g/dL    HCT 29.3 (L) 41.1 - 50.3 %    MCV 83.5 79.6 - 97.8 FL    MCH 26.8 26.1 - 32.9 PG    MCHC 32.1 31.4 - 35.0 g/dL    RDW 18.0 (H) 11.9 - 14.6 %    PLATELET 736 (H) 309 - 450 K/uL    MPV 9.1 (L) 10.8 - 55.1 FL   METABOLIC PANEL, BASIC    Collection Time: 02/27/18  6:24 AM   Result Value Ref Range    Sodium 139 136 - 145 mmol/L    Potassium 3.9 3.5 - 5.1 mmol/L    Chloride 105 98 - 107 mmol/L    CO2 25 21 - 32 mmol/L    Anion gap 9 7 - 16 mmol/L    Glucose 93 65 - 100 mg/dL    BUN 11 8 - 23 MG/DL    Creatinine 1.00 0.8 - 1.5 MG/DL    GFR est AA >60 >60 ml/min/1.73m2    GFR est non-AA >60 >60 ml/min/1.73m2    Calcium 10.1 8.3 - 10.4 MG/DL       All Micro Results     None          Imaging /Procedures /Studies:    MRI LLE 2/16/18 FINDINGS:   There are multiple fluid collections within the deep muscles of the  left leg particularly along the anterior and lateral thigh. One of these  components measures approximately 3.0 x 6.5 x 4.0 cm. There are smaller  components below the knee medially extending to the upper to mid calf. There is  a large joint effusion with peripheral enhancement. There is enhancement about  the joint space. There is marrow edema associated with the medial compartment  which is relatively symmetric to the contralateral side, more likely to be  degenerative rather than osteomyelitis. There is extensive muscular edema. There  is a large joint effusion on the right as well although postcontrast imaging of  this knee was not performed. IMPRESSION:  1. Multiloculated fluid collections within the left lower extremity most  compatible with abscesses. A large joint effusion is present with enhancement as  well as enhancement within the joint space therefore concerning for septic  arthritis. There are no definite findings of osteomyelitis.   2. Large right joint effusion. Echocardiogram 2/14/18: SUMMARY:  -  Left ventricle: Systolic function was normal. Ejection fraction was  estimated in the range of 55 % to 60 %. There were no regional wall motion  abnormalities. There was mild concentric hypertrophy. The E/e' ratio was 8.3.  -  Pericardium: There was no pericardial effusion. Labs and Studies from previous 24 hours have been personally reviewed by myself [facility] Problems as of 2/27/2018  Date Reviewed: 2/20/2018          Codes Class Noted - Resolved POA    * (Principal)Septic arthritis of knee, left ([facility] [address] ICD-10-CM: M00.9  ICD-9-CM: 711.06  2/21/2018 - Present Yes        MRSA bacteremia ICD-10-CM: R78.81  ICD-9-CM: 790.7, 041.12  2/21/2018 - Present Yes        Leukocytosis ICD-10-CM: D72.829  ICD-9-CM: 288.60  2/13/2018 - Present Yes        Hypoalbuminemia ICD-10-CM: E88.09  ICD-9-CM: 273.8  2/13/2018 - Present Yes        Anemia ICD-10-CM: D64.9  ICD-9-CM: 285.9  2/13/2018 - Present Yes        Left leg cellulitis ICD-10-CM: L03.116  ICD-9-CM: 682.6  2/13/2018 - Present Yes        Abnormal transaminases ICD-10-CM: R74.8  ICD-9-CM: 790.4  2/13/2018 - Present Yes              Plan:  L knee septic arthritis with MRSA bacteremia and leg cellulitis:  - aspirated on 2/16/18 and s/p I&D 2/21/18  - On IV vancomycin with EOT:3/19/18  - BC 2/2 from 2/19 negative   -TTE and MANJINDER  w/o vegetations. Plan  Continue Vancomycin 1g Q8H (EOT 3/19/2018)    Acute blood loss anemia:  - on Iron  supplementation  -  Hemoccult collection pending  - s/p 2PRBC yesterday - H/H again lower today. Repeat H/H to be sure is not a lab error. Check INR/PT/PTT - if confirmed with transfuse again 2 PRBCs.    Plan  Evaluated by GI: microcytic anemia most likely secondary to anemia of chronic disease  No evidence of GI bleed    Thrombocytosis  Chronic thrombocytosis during duration of hospitalization  Plan  Peripheral smear pending  Hematology consult    Elevated liver enzymes:  - resolved - unclear etiology  - acute hepatitis panel negative     Debility :  -PT/OT/PPD/IRC  - will need rehab - Pending 9th floor placement today      DVT Prophylaxis:SCD  CODE Status: Full CODE  Plan of Care Discussed with: patient.  Care team.  Medical Risk: high  Disposition: pending in the AM pending hematology clearance    Carlton Rutledge MD  02/27/18

## 2018-02-27 NOTE — PROGRESS NOTES
Problem: Mobility Impaired (Adult and Pediatric)  Goal: *Acute Goals and Plan of Care (Insert Text)  LTG:  (1.)Mr. Annemarie Palmer will perform bed mobility with INDEPENDENCE within 7 treatment day(s). (2.)Mr. Annemarie Palmer will transfer from bed to chair and chair to bed with MODIFIED INDEPENDENCE using the least restrictive device within 7 treatment day(s). (3.)Mr. Annemarie Palmer will ambulate with MODIFIED INDEPENDENCE for 150+ feet with the least restrictive device within 7 treatment day(s). (4.)Mr. Annemarie Palmer will demonstrate GOOD DYNAMIC standing balance utilizing least restrictive assistive device within 7 treatment day(s). (5.)Mr. Annemarie Palmer will tolerate 25+ minutes of therapeutic activity/exercise while maintaining stable vitals to improve functional strength and endurance within 7 day(s). _______________________________________________________________________________________________  PHYSICAL THERAPY: Daily Note, Treatment Day: 2nd, AM 2/27/2018  INPATIENT: Hospital Day: 7  Payor: BLUE CROSS / [place]: [place] [facility] / Product Type: PPO /      Scharlene Hoof in place at all times. NAME/AGE/GENDER: Herman Mast is a 57 y.o. male   PRIMARY DIAGNOSIS: Open wound of left knee, initial encounter [S81.002A] Septic arthritis of knee, left (HCC) Septic arthritis of knee, left (HCC)  Procedure(s) (LRB):  IRRIGATION AND DEBRIDEMENT LEFT KNEE/ BEING ADMITTED FROM [place] [address] ON 2.20 (Left)  6 Days Post-Op  ICD-10: Treatment Diagnosis:   · Generalized Muscle Weakness (M62.81)  · Difficulty in walking, Not elsewhere classified (R26.2)   Precaution/Allergies:  Review of patient's allergies indicates no known allergies. ASSESSMENT:     Mr. Annemarie Palmer is a 57year old male admitted with left knee infection and 2 day s/p L knee irigation and debridement. Patient was sitting up in straight back chair upon contact and agreeable to PT.  Patient able to transfer to standing with CGASBA and ambulate 250' with use of rolling walker, CGA-SBA, and occasional cues for sequencing with rolling walker. Improved sequencing and walker manipulation noted. Patient was later rolled to therapy gym to participate in group therapeutic strengthening exercises to improve functional strength for transfers, gait and overall mobility. Patient requires cues to perform exercises correctly. Left knee ROM measured at 65 degrees flexion and -10 degrees extension. Overall slow, steady progress towards physical therapy goals. No goals have been met thus far. Will continue efforts. This section established at most recent assessment   PROBLEM LIST (Impairments causing functional limitations):  1. Decreased Strength  2. Decreased ADL/Functional Activities  3. Decreased Transfer Abilities  4. Decreased Ambulation Ability/Technique  5. Decreased Balance  6. Increased Pain  7. Decreased Activity Tolerance  8. Decreased Flexibility/Joint Mobility  9. Decreased Knowledge of Precautions  10. Decreased [place] with Home Exercise Program   INTERVENTIONS PLANNED: (Benefits and precautions of physical therapy have been discussed with the patient.)  1. Balance Exercise  2. Bed Mobility  3. Family Education  4. Gait Training  5. Range of Motion (ROM)  6. Therapeutic Activites  7. Transfer Training  8. Group Therapy     TREATMENT PLAN: Frequency/Duration: 3 times a week for duration of hospital stay  Rehabilitation Potential For Stated Goals: GOOD     RECOMMENDED REHABILITATION/EQUIPMENT: (at time of discharge pending progress): Due to the probability of continued deficits (see above) this patient will likely need continued skilled physical therapy after discharge. Equipment:    Walkers, Type: Rolling Walker              HISTORY:   History of Present Injury/Illness (Reason for Referral):  S/p left knee I&D  Past Medical History/Comorbidities:   Mr. Matt Nugent  has no past medical history on file.   Mr. Matt Nugent  has a past surgical history that includes hx orthopaedic. Social History/Living Environment:   Home Environment: Private residence  Wheelchair Ramp: Yes  One/Two Story Residence: One story  Living Alone: Yes  Support Systems: Family member(s)  Patient Expects to be Discharged to[de-identified] Private residence  Current DME Used/Available at Home: Cane, straight  Prior Level of Function/Work/Activity:   Patient lives alone in a single story residence with 0 steps to enter. At baseline, patient is independent with ADLs, ambulates without utilizing an assistive device, and endorses 0 recent falls. Patient with difficult history to fall regarding falls and L LE pain. Number of Personal Factors/Comorbidities that affect the Plan of Care: 1-2: MODERATE COMPLEXITY   EXAMINATION:   Most Recent Physical Functioning:   Gross Assessment:                  Posture:     Balance:  Sitting: Intact  Standing: Impaired  Standing - Static: Good  Standing - Dynamic : Fair Bed Mobility:     Wheelchair Mobility:     Transfers:  Sit to Stand: Contact guard assistance;Stand-by assistance  Stand to Sit: Contact guard assistance;Stand-by assistance  Gait:     Base of Support: Shift to right  Speed/Rosalie: Slow  Step Length: Right shortened;Left shortened  Gait Abnormalities: Decreased step clearance;Trunk sway increased  Distance (ft): 250 Feet (ft)  Assistive Device: Walker, rolling  Ambulation - Level of Assistance: Contact guard assistance;Stand-by assistance  Interventions: Safety awareness training; Tactile cues; Verbal cues      Body Structures Involved:  1. Bones  2. Joints  3. Muscles Body Functions Affected:  1. Neuromusculoskeletal  2. Movement Related Activities and Participation Affected:  1. Mobility  2.  Self Care   Number of elements that affect the Plan of Care: 4+: HIGH COMPLEXITY   CLINICAL PRESENTATION:   Presentation: Stable and uncomplicated: LOW COMPLEXITY   CLINICAL DECISION MAKIN [facility] [address] [id number] AM-PAC 6 Clicks   Basic Mobility Inpatient Short Form  How much difficulty does the patient currently have. .. Unable A Lot A Little None   1. Turning over in bed (including adjusting bedclothes, sheets and blankets)? [] 1   [] 2   [] 3   [x] 4   2. Sitting down on and standing up from a chair with arms ( e.g., wheelchair, bedside commode, etc.)   [] 1   [] 2   [x] 3   [] 4   3. Moving from lying on back to sitting on the side of the bed? [] 1   [] 2   [x] 3   [] 4   How much help from another person does the patient currently need. .. Total A Lot A Little None   4. Moving to and from a bed to a chair (including a wheelchair)? [] 1   [] 2   [x] 3   [] 4   5. Need to walk in hospital room? [] 1   [] 2   [x] 3   [] 4   6. Climbing 3-5 steps with a railing? [] 1   [x] 2   [] 3   [] 4   © 2007, Trustees of [address] [place] [address], under license to [employer]. All rights reserved      Score:  Initial: 18 Most Recent: 18 (Date: 2/23/18 )    Interpretation of Tool:  Represents activities that are increasingly more difficult (i.e. Bed mobility, Transfers, Gait). Score 24 23 22-20 19-15 14-10 9-7 6     Modifier CH CI CJ CK CL CM CN      ? Mobility - Walking and Moving Around:     - CURRENT STATUS: CK - 40%-59% impaired, limited or restricted    - GOAL STATUS: CJ - 20%-39% impaired, limited or restricted    - D/C STATUS:  ---------------To be determined---------------  Payor: BLUE CROSS / [place] OF [address] [place] [address] Rd / Product Type: PPO /      Medical Necessity:     · Patient demonstrates good rehab potential due to higher previous functional level. Reason for Services/Other Comments:  · Patient continues to require skilled intervention due to decreased functional mobility and balance/gait status from baseline. .   Use of outcome tool(s) and clinical judgement create a POC that gives a: Clear prediction of patient's progress: LOW COMPLEXITY            TREATMENT:   (In addition to Assessment/Re-Assessment sessions the following treatments were rendered) Pre-treatment Symptoms/Complaints:    Pain: Initial:   Pain Intensity 1: 0  Post Session:  0/10     Therapeutic Activity: (    15 Minutes): Therapeutic activities including transfer training, ambulation on level ground, static/dynamic standing balance activities, instruction in sequencing with rolling walker, scooting, and patient education to improve mobility, strength and balance. Required minimal Safety awareness training; Tactile cues; Verbal cues to promote static and dynamic balance in standing and promote coordination of bilateral, lower extremity(s). Group Therapeutic Exercise: (  10 Minutes):  Exercises per grid below to improve mobility, strength, balance and stiffness/soreness. Required moderate visual, verbal, manual and tactile cues to promote proper body alignment, promote proper body posture and promote proper body mechanics. Progressed range, repetitions and complexity of movement as indicated. Date:  2/26/18 Date:   Date:     ACTIVITY/EXERCISE AM PM AM PM AM PM   Ambulation:           Distance  Device  Duration         Seated Heel Raises x15B A x15B A       Seated Toe Raises x15B A x15B A       Seated Long Arc Quads x15B A x15B A       Seated Marching x15B A x15B A       Seated Hip Abduction x15B A x15B A       Seated heel slides x10L A x10L A       Seated quad sets x10L A x10L A       B = bilateral; AA = active assistive; A = active; P = passive        Braces/Orthotics/Lines/Etc:   · None  Treatment/Session Assessment:    · Response to Treatment:  See above. · Interdisciplinary Collaboration:   o Physical Therapy Assistant  o Registered Nurse  · After treatment position/precautions:   o Up in chair  o Bed alarm/tab alert on  o Bed/Chair-wheels locked  o Call light within reach  o RN notified  o All needs within reach   · Compliance with Program/Exercises: Will assess as treatment progresses. · Recommendations/Intent for next treatment session:   \"Next visit will focus on advancements to more challenging activities and reduction in assistance provided\".   Total Treatment Duration:  PT Patient Time In/Time Out  Time In: 0955 (1140)  Time Out: 1110 (1150)    Damian Mercer, PTA

## 2018-02-28 LAB
CREAT SERPL-MCNC: 1.03 MG/DL (ref 0.8–1.5)
ERYTHROCYTE [DISTWIDTH] IN BLOOD BY AUTOMATED COUNT: 18.2 % (ref 11.9–14.6)
FOLATE SERPL-MCNC: 18.9 NG/ML (ref 3.1–17.5)
HCT VFR BLD AUTO: 26.2 % (ref 41.1–50.3)
HCT VFR BLD AUTO: 26.6 % (ref 41.1–50.3)
HGB BLD-MCNC: 8.2 G/DL (ref 13.6–17.2)
HGB BLD-MCNC: 8.4 G/DL (ref 13.6–17.2)
HGB RETIC QN AUTO: 25 PG (ref 29–35)
IMM RETICS NFR: 26.4 % (ref 2.3–13.4)
LDH SERPL L TO P-CCNC: 457 U/L (ref 110–210)
MCH RBC QN AUTO: 26.5 PG (ref 26.1–32.9)
MCHC RBC AUTO-ENTMCNC: 31.6 G/DL (ref 31.4–35)
MCV RBC AUTO: 83.9 FL (ref 79.6–97.8)
PLATELET # BLD AUTO: 532 K/UL (ref 150–450)
PMV BLD AUTO: 9.1 FL (ref 10.8–14.1)
RBC # BLD AUTO: 3.17 M/UL (ref 4.23–5.67)
RETICS # AUTO: 0.13 M/UL (ref 0.03–0.1)
RETICS/RBC NFR AUTO: 4 % (ref 0.3–2)
VANCOMYCIN TROUGH SERPL-MCNC: 19.4 UG/ML (ref 5–20)
VIT B12 SERPL-MCNC: 587 PG/ML (ref 193–986)
WBC # BLD AUTO: 11.1 K/UL (ref 4.3–11.1)

## 2018-02-28 PROCEDURE — 99223 1ST HOSP IP/OBS HIGH 75: CPT | Performed by: INTERNAL MEDICINE

## 2018-02-28 PROCEDURE — 82746 ASSAY OF FOLIC ACID SERUM: CPT | Performed by: NURSE PRACTITIONER

## 2018-02-28 PROCEDURE — 80202 ASSAY OF VANCOMYCIN: CPT | Performed by: INTERNAL MEDICINE

## 2018-02-28 PROCEDURE — 65270000029 HC RM PRIVATE

## 2018-02-28 PROCEDURE — 74011250636 HC RX REV CODE- 250/636: Performed by: NURSE PRACTITIONER

## 2018-02-28 PROCEDURE — 84238 ASSAY NONENDOCRINE RECEPTOR: CPT | Performed by: NURSE PRACTITIONER

## 2018-02-28 PROCEDURE — 83615 LACTATE (LD) (LDH) ENZYME: CPT | Performed by: NURSE PRACTITIONER

## 2018-02-28 PROCEDURE — 87389 HIV-1 AG W/HIV-1&-2 AB AG IA: CPT | Performed by: NURSE PRACTITIONER

## 2018-02-28 PROCEDURE — 82607 VITAMIN B-12: CPT | Performed by: NURSE PRACTITIONER

## 2018-02-28 PROCEDURE — 82565 ASSAY OF CREATININE: CPT | Performed by: INTERNAL MEDICINE

## 2018-02-28 PROCEDURE — 85018 HEMOGLOBIN: CPT | Performed by: HOSPITALIST

## 2018-02-28 PROCEDURE — 74011250636 HC RX REV CODE- 250/636: Performed by: INTERNAL MEDICINE

## 2018-02-28 PROCEDURE — 74011250637 HC RX REV CODE- 250/637: Performed by: FAMILY MEDICINE

## 2018-02-28 PROCEDURE — 85027 COMPLETE CBC AUTOMATED: CPT | Performed by: HOSPITALIST

## 2018-02-28 PROCEDURE — 97530 THERAPEUTIC ACTIVITIES: CPT

## 2018-02-28 PROCEDURE — 85046 RETICYTE/HGB CONCENTRATE: CPT | Performed by: NURSE PRACTITIONER

## 2018-02-28 RX ORDER — VANCOMYCIN/0.9 % SOD CHLORIDE 1.5G/250ML
1500 PLASTIC BAG, INJECTION (ML) INTRAVENOUS EVERY 12 HOURS
Status: DISCONTINUED | OUTPATIENT
Start: 2018-03-01 | End: 2018-03-05

## 2018-02-28 RX ADMIN — VANCOMYCIN HYDROCHLORIDE 1000 MG: 1 INJECTION, POWDER, LYOPHILIZED, FOR SOLUTION INTRAVENOUS at 03:13

## 2018-02-28 RX ADMIN — DOCUSATE SODIUM 100 MG: 100 CAPSULE, LIQUID FILLED ORAL at 16:42

## 2018-02-28 RX ADMIN — Medication 300 UNITS: at 11:52

## 2018-02-28 RX ADMIN — Medication 300 UNITS: at 05:13

## 2018-02-28 RX ADMIN — Medication 10 ML: at 05:13

## 2018-02-28 RX ADMIN — Medication 10 ML: at 22:17

## 2018-02-28 RX ADMIN — OXYCODONE HYDROCHLORIDE AND ACETAMINOPHEN 2 TABLET: 5; 325 TABLET ORAL at 11:05

## 2018-02-28 RX ADMIN — Medication 300 UNITS: at 22:16

## 2018-02-28 RX ADMIN — OXYCODONE HYDROCHLORIDE AND ACETAMINOPHEN 2 TABLET: 5; 325 TABLET ORAL at 22:17

## 2018-02-28 RX ADMIN — DOCUSATE SODIUM 100 MG: 100 CAPSULE, LIQUID FILLED ORAL at 08:25

## 2018-02-28 RX ADMIN — VANCOMYCIN HYDROCHLORIDE 1000 MG: 1 INJECTION, POWDER, LYOPHILIZED, FOR SOLUTION INTRAVENOUS at 11:52

## 2018-02-28 RX ADMIN — OXYCODONE HYDROCHLORIDE AND ACETAMINOPHEN 2 TABLET: 5; 325 TABLET ORAL at 16:42

## 2018-02-28 RX ADMIN — Medication 10 ML: at 13:21

## 2018-02-28 RX ADMIN — OXYCODONE HYDROCHLORIDE AND ACETAMINOPHEN 2 TABLET: 5; 325 TABLET ORAL at 05:13

## 2018-02-28 NOTE — OP NOTES
[facility] REPORT    Shmuel Fernández  MR#: [id number]  : 1955  ACCOUNT #: [de-identified]   DATE OF SERVICE: 2018    PREOPERATIVE DIAGNOSIS:  Septic arthritis, left knee, with thigh abscess. POSTOPERATIVE DIAGNOSIS:  Septic arthritis, left knee, with thigh abscess. PROCEDURES PERFORMED:  1. Irrigation and debridement of left knee and thigh. 2.  Delayed primary closure, left knee and thigh. SURGEON:  Em Lundy MD    ASSISTANT: NONE    ANESTHESIA:  General.    SPECIMENS REMOVED: NONE    IMPLANTS: NONE    PROCEDURE:  The patient was brought to the operative suite, placed in the supine position. After adequate anesthesia was achieved in the form of general, the patient had a tourniquet applied to the left thigh with an adequately padded Sof-Rol, preset to the level of 300 mmHg. Left lower extremity was then prepped and draped in usual sterile fashion, elevated and exsanguinated with Esmarch. Tourniquet was inflated. The previous incision was extended distally. The arthrotomy had been made through the very lateral aspect of the quad tendon, so repair was going to have to be tendon to muscle instead of tendon to tendon. The knee was sharply debrided, excisional, as was the thigh. No additional purulence was noted. Irrigation was performed in the form of pulse lavage. The middle 3 L contained bacitracin. It was 9 liters total.  The wound was then closed primarily over a drain. The drain was inserted into the knee joint. It was then closed again with a side-to-side repair of the quadriceps tendon to the muscle. The remainder of the wound was then closed in layers. A sterile compressive dressing was applied. A knee immobilizer was applied. The patient then had the tourniquet deflated. He was transferred to the recovery room, alert and oriented under the care of Anesthesia.     ESTIMATED BLOOD LOSS:  Minimal.    FLUIDS:  See Anesthesia record. CLOSURE:  Primary. COMPLICATIONS:  None. MD Thea Hyman / Kyler Ryan  D: 02/28/2018 16:06     T: 02/28/2018 17:34  JOB #: [id number]

## 2018-02-28 NOTE — PROGRESS NOTES
Problem: Nutrition Deficit  Goal: *Optimize nutritional status  Nutrition LOS Note: day 7 @ [facility]  Assessment  Diet order(s): GI soft  Food,Nutrition, and Pertinent History: The patient is transferred from [facility] in which he was assessed by RD there. He is s/p I&D of L leg wound on 2/17 and I&D of knee on 2/21. He reports that he is unable to eat \"heavy foods\" since he started eating healthier PTA. He discusses how he was on a \"cleanse\" for [employer] purposes in January (salads, water, protein drinks, etc.)  He has been drinking ensure enlive. Anthropometrics: Height: 5' 8\" (172.7 cm),  , Weight: 113.4 kg (250 lb), Body mass index is 38.01 kg/(m^2). BMI class of obesity class II. Macronutrient Needs:  · EER:  1489-5681 kcal /day (14-18 kcal/kg listed BW)  · EPR:  70-91 grams protein/day (1-1.3 grams/kg IBW)  Intake/Comparative Standards:  Average intake for past 7 day(s)/3 recorded meal(s): 37%. This potentially meets ~52% of kcal and ~51% of protein needs    Nutrition Diagnosis: Inadequate oral intake r/t decreased ability to consume adequate oral intake, as evidenced by patient meeting above noted needs. Intervention:   Meals and snacks: Continue current diet. Add preferences.    Supplementation: Increase ensure enlive from BID to TID  Discharge nutrition plan: No discharge needs identified    Beverley Pablo Daniel [address], [address] [place] [address], [phone]

## 2018-02-28 NOTE — PROGRESS NOTES
Problem: Falls - Risk of  Goal: *Absence of Falls  Document Kinjal Fall Risk and appropriate interventions in the flowsheet.    Outcome: Progressing Towards Goal  Fall Risk Interventions:  Mobility Interventions: Bed/chair exit alarm, Communicate number of staff needed for ambulation/transfer, OT consult for ADLs, Patient to call before getting OOB, PT Consult for mobility concerns, PT Consult for assist device competence, Utilize walker, cane, or other assitive device    Mentation Interventions: Adequate sleep, hydration, pain control, Bed/chair exit alarm, Door open when patient unattended, Evaluate medications/consider consulting pharmacy, Eyeglasses and hearing aids, Familiar objects from home, Family/sitter at bedside, Gait belt with transfers/ambulation, Increase mobility, More frequent rounding, Reorient patient, Room close to nurse's station, Self-releasing belt, Toileting rounds, Update white board    Medication Interventions: Evaluate medications/consider consulting pharmacy, Patient to call before getting OOB, Teach patient to arise slowly    Elimination Interventions: Bed/chair exit alarm, Call light in reach, Patient to call for help with toileting needs

## 2018-02-28 NOTE — PROGRESS NOTES
Pharmacokinetic Consult to Pharmacist    Willardkaleb Bernstein is a 57 y.o. male being treated for MRSA bacteremia, pyomyositis, and septic arthritis with vancomycin. Height: 5' 8\" (172.7 cm)  Weight: 113.4 kg (250 lb)  Lab Results   Component Value Date/Time    BUN 11 02/27/2018 06:24 AM    Creatinine 1.03 02/28/2018 10:53 AM    WBC 11.1 02/28/2018 05:24 AM      Estimated Creatinine Clearance: 88.5 mL/min (based on Cr of 1.03). Lab Results   Component Value Date/Time    Vancomycin,trough 19.4 02/28/2018 10:53 AM       EOT per ID is 3/20/18. Day 14 of vancomycin. Goal trough is 15-20. Trough resulted at goal at 19.4. In order to help facilitate transition to facility at discharge, will attempt to adjust dosing from [facility] to Q12H. Based on patient specific kinetics, will adjust dose to 1500 mg Q12H starting tonight. Plan trough prior to the 4th dose of this new regimen. Will continue to follow patient.       Thank you,  Emily Limon, PharmD  Clinical Pharmacist  [phone]

## 2018-02-28 NOTE — PROGRESS NOTES
Spoke with patient concerning referral for STR and patient stated he had spoken with his sister and she requested [facility]. Referral sent.

## 2018-02-28 NOTE — PROGRESS NOTES
Problem: Falls - Risk of  Goal: *Absence of Falls  Document Kinjal Fall Risk and appropriate interventions in the flowsheet.    Outcome: Progressing Towards Goal  Fall Risk Interventions:  Mobility Interventions: Bed/chair exit alarm, OT consult for ADLs, PT Consult for mobility concerns, Patient to call before getting OOB, PT Consult for assist device competence, Strengthening exercises (ROM-active/passive), Utilize walker, cane, or other assitive device    Mentation Interventions: Adequate sleep, hydration, pain control, Bed/chair exit alarm, Door open when patient unattended, Evaluate medications/consider consulting pharmacy, Eyeglasses and hearing aids, Familiar objects from home, Family/sitter at bedside, Gait belt with transfers/ambulation, Increase mobility, More frequent rounding, Reorient patient, Room close to nurse's station, Self-releasing belt, Toileting rounds, Update white board    Medication Interventions: Assess postural VS orthostatic hypotension, Bed/chair exit alarm, Patient to call before getting OOB, Teach patient to arise slowly    Elimination Interventions: Bed/chair exit alarm, Call light in reach, Patient to call for help with toileting needs, Toilet paper/wipes in reach

## 2018-02-28 NOTE — CONSULTS
[facility]        Inpatient Hematology / Oncology Consult Note    Reason for Consult:  Open wound of left knee, initial encounter [T99.192E]  Referring Physician:  Zeke Browning MD    History of Present Illness:  Mr. Jeni Greene is a 57 y.o. male admitted on 2/21/2018 with a primary diagnosis of Diagnoses of Cellulitis of left leg, Abscess of left leg, Iron deficiency anemia, unspecified iron deficiency anemia type, Abnormal transaminases, Leukocytosis, unspecified type, Pyogenic arthritis of left knee joint, due to unspecified organism [facility]), MRSA bacteremia, and Staphylococcal arthritis of left knee ([facility] [address] were pertinent to this visit. .      Patient is a 57 y.o. male who denies any significant PMHx who is seen in consultation further evaluation of anemia. Patient was admitted on 2/13/18 for left leg cellulitis MRSA + started on vancomycin. MRI of leg showed multiple loculated abscesses and finding of septic knee. He lives in [place] and was visiting family here. Presenting Hgb on 2/13 was 8.5 with MCV of 78. Hgb dropped to 6.7 on 2/25, got a total of 3u PRBCs on 2/25-2/26, and Hgb improved and remains stable in the 9s. Review of Systems:  Constitutional Denies fever, chills, weight loss, appetite changes, fatigue   HEENT Denies trauma, blurry vision, hearing loss, ear pain, nosebleeds, sore throat, neck pain   Skin Left leg cellulitis   Lungs Denies dyspnea, cough, sputum production or hemoptysis. Cardiovascular Denies chest pain, palpitations   Gastrointestinal Denies nausea, vomiting, changes in bowel habits, bloody or black stools, abdominal pain. Neuro Denies headaches, visual changes or ataxia. MSK Denies back pain, arthralgias, myalgias or frequent falls. Psychiatric/Behavioral The patient is not nervous/anxious. No Known Allergies  History reviewed. No pertinent past medical history.   Past Surgical History:   Procedure Laterality Date    HX ORTHOPAEDIC      back     History reviewed. No pertinent family history. Social History     Social History    Marital status: SINGLE     Spouse name: N/A    Number of children: N/A    Years of education: N/A     Occupational History    Not on file.      Social History Main Topics    Smoking status: Never Smoker    Smokeless tobacco: Never Used    Alcohol use No    Drug use: No    Sexual activity: Not on file     Other Topics Concern    Not on file     Social History Narrative     Current Facility-Administered Medications   Medication Dose Route Frequency Provider Last Rate Last Dose    Vancomycin Trough Reminder   Other Vianney Galindo MD        0.9% sodium chloride infusion 250 mL  250 mL IntraVENous PRN Lieutenant Marc MD        hydrALAZINE (APRESOLINE) 20 mg/mL injection 10 mg  10 mg IntraVENous Q6H PRN Lieutenant Marc MD        docusate sodium (COLACE) capsule 100 mg  100 mg Oral BID Tyrese Contreras MD   100 mg at 02/28/18 0825    lip protectant (BLISTEX) ointment   Topical PRN Juan Jose MD        sodium chloride (NS) flush 10 mL  10 mL [facility], NP   10 mL at 02/28/18 0513    heparin (porcine) pf 300 Units  300 Units [facility], NP   300 Units at 02/28/18 0513    sodium chloride (NS) flush 10 mL  10 mL InterCATHeter PRN Everette Nyhan, NP        heparin (porcine) pf 300 Units  300 Units InterCATHeter PRN Everette Nyhan, NP        vancomycin (VANCOCIN) 1,000 mg in 0.9% sodium chloride (MBP/ADV) 250 mL  1 g IntraVENous Q8H Clint Crowley  mL/hr at 02/28/18 0313 1,000 mg at 02/28/18 0313    acetaminophen (TYLENOL) tablet 650 mg  650 mg Oral Q6H PRN Alberto Torres MD        naloxone [facility]) injection 0.4 mg  0.4 mg IntraVENous EVERY 2 MINUTES AS NEEDED Alberto Torres MD        oxyCODONE-acetaminophen (PERCOCET) 5-325 mg per tablet 1 Tab  1 Tab Oral Q4H PRN Alberto Torres MD        oxyCODONE-acetaminophen (PERCOCET) 5-325 mg per tablet 2 Tab  2 Tab Oral Q4H PRN Danny Lerma MD   2 Tab at 18 0513    traMADol (ULTRAM) tablet 50 mg  50 mg Oral Q6H PRN Danny Lerma MD   50 mg at 18 8996       OBJECTIVE:  Patient Vitals for the past 8 hrs:   BP Temp Pulse Resp SpO2   18 0320 132/77 99.2 °F (37.3 °C) 93 18 96 %     Temp (24hrs), Av.6 °F (37 °C), Min:97.8 °F (36.6 °C), Max:99.2 °F (37.3 °C)     0701 -  1900  In: -   Out: 350 [Urine:350]    Physical Exam:  Constitutional: Well developed, well nourished male in no acute distress, sitting comfortably in the hospital bed. HEENT: Normocephalic and atraumatic. Oropharynx is clear, mucous membranes are moist. Extraocular muscles are intact. Sclerae anicteric. Lymph node   No palpable submandibular, cervical, supraclavicular, axillary  lymph nodes. Skin Left leg cellulitis. Respiratory Lungs are clear to auscultation bilaterally without wheezes, rales or rhonchi, normal air exchange without accessory muscle use. CVS Normal rate, regular rhythm and normal S1 and S2. No murmurs, gallops, or rubs. Abdomen Soft, nontender and nondistended, normoactive bowel sounds. No palpable mass. No hepatosplenomegaly. Neuro Grossly nonfocal with no obvious sensory or motor deficits. MSK Normal range of motion in general.  No edema and no tenderness. Psych Appropriate mood and affect.         Labs:    Recent Results (from the past 24 hour(s))   HGB & HCT    Collection Time: 18  4:14 PM   Result Value Ref Range    HGB 8.2 (L) 13.6 - 17.2 g/dL    HCT 26.5 (L) 41.1 - 50.3 %   HGB & HCT    Collection Time: 18  3:26 AM   Result Value Ref Range    HGB 8.2 (L) 13.6 - 17.2 g/dL    HCT 26.2 (L) 41.1 - 50.3 %   CBC W/O DIFF    Collection Time: 18  5:24 AM   Result Value Ref Range    WBC 11.1 4.3 - 11.1 K/uL    RBC 3.17 (L) 4.23 - 5.67 M/uL    HGB 8.4 (L) 13.6 - 17.2 g/dL    HCT 26.6 (L) 41.1 - 50.3 %    MCV 83.9 79.6 - 97.8 FL    MCH 26.5 26.1 - 32.9 PG    MCHC 31.6 31.4 - 35.0 g/dL    RDW 18.2 (H) 11.9 - 14.6 %    PLATELET 426 (H) 871 - 450 K/uL    MPV 9.1 (L) 10.8 - 14.1 FL       Imaging:  [unfilled]    ASSESSMENT:  Problem List  Date Reviewed: 2/20/2018          Codes Class Noted    * (Principal)Septic arthritis of knee, left ([address] ICD-10-CM: M00.9  ICD-9-CM: 711.06  2/21/2018        MRSA bacteremia ICD-10-CM: R78.81  ICD-9-CM: 790.7, 041.12  2/21/2018        Cellulitis ICD-10-CM: L03.90  ICD-9-CM: 682.9  2/15/2018        Leukocytosis ICD-10-CM: T40.134  ICD-9-CM: 288.60  2/13/2018        Hypoalbuminemia ICD-10-CM: E88.09  ICD-9-CM: 273.8  2/13/2018        Anemia ICD-10-CM: D64.9  ICD-9-CM: 285.9  2/13/2018        Left leg cellulitis ICD-10-CM: L03.116  ICD-9-CM: 682.6  2/13/2018        Abnormal transaminases ICD-10-CM: R74.8  ICD-9-CM: 790.4  2/13/2018        Thrombocytosis (HCC) ICD-10-CM: D47.3  ICD-9-CM: 238.71  2/13/2018              RECOMMENDATIONS:  Patient with history of left leg cellulitis, MRI showed multiple loculated abscesses, and septic knee, MRSA + most like has anemia of chronic disease (anemia of inflammation). Checking b12, folate, HIV, LDH, retic, and soluble transferrin receptor. Patient is being discharged today. He should follow up with his PCP with results in hand. Lab studies and imaging studies  were personally reviewed. Pertinent old records were reviewed. Thank you for allowing us to participate in the care of Mr. Cavazos.           [facility]  [address] [address]  Office : [phone]  Fax : [phone]

## 2018-02-28 NOTE — PROGRESS NOTES
Infectious Disease Progress Note    Today's Date: 2018   Admit Date: 2018    Impression:   · MRSA bacteremia ( - ); repeat cultures  NG; TTE  negative for vegetation, MANJINDER NG  · MRSA pyomyositis left leg  · MRSA left knee septic arthritis, aspirated 18, s/p I&D   · Recent lumbar spine surgery with hardware, 10/2017 (in [place])  · Elevated LFTs - hep panel negative; ? NAFLD  · Microcytic anemia with elevated ferritin - likely Chronic disease anemia or inflammatory anemia  · Thrombocytosis    Plan:   · Continue IV vancomycin (currently on 1g Q8hrs: VT and Cr pending this afternoon)  · Duration: 4 weeks, EOT 3/20/18  Dispo: need STR, unable to go to 9th floor. Please note ID does not follow patient at [facility], recommend Labs: Qmonday CBC with diff, Creatinine, ESR, CRP Vanc trough and Q Thursday Creatinine, vanc trough with routine PICC care    We will plan to see at EOT: ID follow up 3/20/18 @ 8:40am    Anti-infectives:   IV vancomycin    Subjective:     Up to the chair reports occasional chills. Left leg/knee painful, but stable. Denies nausea, vomiting, diarrhea. No Known Allergies     Review of Systems:  Pertinent items are noted in the History of Present Illness.     Objective:     Visit Vitals    /87 (BP 1 Location: Left arm, BP Patient Position: At rest)    Pulse 93    Temp 99.2 °F (37.3 °C)    Resp 18    Ht 5' 8\" (1.727 m)    Wt 113.4 kg (250 lb)    SpO2 98%    BMI 38.01 kg/m2     Temp (24hrs), Av.7 °F (37.1 °C), Min:97.8 °F (36.6 °C), Max:99.2 °F (37.3 °C)       Lines:   RUE PICC intact        Physical Exam:    General:  Alert, cooperative, obese   Eyes:  Sclera anicteric   Mouth/Throat: oral pharynx clear   Neck: Supple   Lungs:   CTA, room air   CV:  Tachycardic, without audible murmur   Abdomen:   non-distended, normoactive BS   Extremities: RLE 1+ edema   Skin: No rashes/hives   Musculoskeletal: LLE edematous, tegaderm/giaze dressings in place, C/D/I Lines/Devices:  Intact, no erythema, drainage or tenderness   Psych: Alert, oriented x3     Data Review:     CBC:  Recent Labs      18   0524  18   0326  18   1614  18   0624   18   0814   WBC  11.1   --    --   11.8*   --   12.3*   GRANS   --    --    --    --    --   71   MONOS   --    --    --    --    --   9   EOS   --    --    --    --    --   3   ANEU   --    --    --    --    --   8.8*   ABL   --    --    --    --    --   1.9   HGB  8.4*  8.2*  8.2*  9.4*   < >  7.0*   HCT  26.6*  26.2*  26.5*  29.3*   < >  22.4*   PLT  532*   --    --   538*   --   562*    < > = values in this interval not displayed. BMP:  Recent Labs      18   0624   CREA  1.00   BUN  11   NA  139   K  3.9   CL  105   CO2  25   AGAP  9   GLU  93       LFTS:  No results for input(s): TBILI, ALT, SGOT, AP, TP, ALB in the last 72 hours. Microbiology:     All Micro Results     None          Imagin/15/18 L knee film: IMPRESSION:  1. Large joint effusion and soft tissue edema without acute osseous abnormality. An infectious/inflammatory process of the left knee joint cannot be excluded  given the appearance. Recommend clinical correlation. 18 MRI left leg  IMPRESSION:  1. Multiloculated fluid collections within the left lower extremity most compatible with abscesses. A large joint effusion is present with enhancement as well as enhancement within the joint space therefore concerning for septic arthritis. There are no definite findings of osteomyelitis. 2. Large right joint effusion.     Signed By: Charlotte Oseguera NP     2018

## 2018-02-28 NOTE — PROGRESS NOTES
OT note:  Pt declined treatment stating he just does not feel well. Pt's PCT stated pt had a fever of 100.5. Will re-attempt at a later date as schedule permits.   Martha Ramey

## 2018-02-28 NOTE — PROGRESS NOTES
Problem: Mobility Impaired (Adult and Pediatric)  Goal: *Acute Goals and Plan of Care (Insert Text)  LTG:  (1.)Mr. Ailin Thomson will perform bed mobility with INDEPENDENCE within 7 treatment day(s). (2.)Mr. Ailin Thomson will transfer from bed to chair and chair to bed with MODIFIED INDEPENDENCE using the least restrictive device within 7 treatment day(s). (3.)Mr. Ailin Thomson will ambulate with MODIFIED INDEPENDENCE for 150+ feet with the least restrictive device within 7 treatment day(s). (4.)Mr. Ailin Thomson will demonstrate GOOD DYNAMIC standing balance utilizing least restrictive assistive device within 7 treatment day(s). (5.)Mr. Ailin Thomson will tolerate 25+ minutes of therapeutic activity/exercise while maintaining stable vitals to improve functional strength and endurance within 7 day(s). _______________________________________________________________________________________________  PHYSICAL THERAPY: Daily Note, Treatment Day: 2nd, PM 2/28/2018  INPATIENT: Hospital Day: 8  Payor: CAYLA LAWS / Plan: [address] / Product Type: PPO /      Shavonne Aravind in place at all times. NAME/AGE/GENDER: Milan Garcia is a 57 y.o. male   PRIMARY DIAGNOSIS: Open wound of left knee, initial encounter [S81.002A] Septic arthritis of knee, left (HCC) Septic arthritis of knee, left (HCC)  Procedure(s) (LRB):  IRRIGATION AND DEBRIDEMENT LEFT KNEE/ BEING ADMITTED FROM [place] [id number] ON 2.20 (Left)  7 Days Post-Op  ICD-10: Treatment Diagnosis:   · Generalized Muscle Weakness (M62.81)  · Difficulty in walking, Not elsewhere classified (R26.2)   Precaution/Allergies:  Review of patient's allergies indicates no known allergies. ASSESSMENT:     Mr. Ailin Thomson is a 57year old male admitted with left knee infection and 2 day s/p L knee irigation and debridement. Patient was sitting up in straight back chair upon contact and agreeable to PT.  Patient able to transfer to standing with SBA and ambulate 250' with use of rolling walker, SBA, and occasional cues for sequencing with rolling walker. Improved sequencing and walker manipulation noted. Overall slow, steady progress towards physical therapy goals. No goals have been met thus far. Will continue efforts. This section established at most recent assessment   PROBLEM LIST (Impairments causing functional limitations):  1. Decreased Strength  2. Decreased ADL/Functional Activities  3. Decreased Transfer Abilities  4. Decreased Ambulation Ability/Technique  5. Decreased Balance  6. Increased Pain  7. Decreased Activity Tolerance  8. Decreased Flexibility/Joint Mobility  9. Decreased Knowledge of Precautions  10. Decreased [place] with Home Exercise Program   INTERVENTIONS PLANNED: (Benefits and precautions of physical therapy have been discussed with the patient.)  1. Balance Exercise  2. Bed Mobility  3. Family Education  4. Gait Training  5. Range of Motion (ROM)  6. Therapeutic Activites  7. Transfer Training  8. Group Therapy     TREATMENT PLAN: Frequency/Duration: 3 times a week for duration of hospital stay  Rehabilitation Potential For Stated Goals: GOOD     RECOMMENDED REHABILITATION/EQUIPMENT: (at time of discharge pending progress): Due to the probability of continued deficits (see above) this patient will likely need continued skilled physical therapy after discharge. Equipment:    Walkers, Type: Rolling Walker              HISTORY:   History of Present Injury/Illness (Reason for Referral):  S/p left knee I&D  Past Medical History/Comorbidities:   Mr. Erlinda Mahan  has no past medical history on file. Mr. Erlinda Mahan  has a past surgical history that includes hx orthopaedic.   Social History/Living Environment:   Home Environment: Private residence  Wheelchair Ramp: Yes  One/Two Story Residence: One story  Living Alone: Yes  Support Systems: Family member(s)  Patient Expects to be Discharged to[de-identified] Private residence  Current DME Used/Available at Home: Cane, straight  Prior Level of Function/Work/Activity:   Patient lives alone in a single story residence with 0 steps to enter. At baseline, patient is independent with ADLs, ambulates without utilizing an assistive device, and endorses 0 recent falls. Patient with difficult history to fall regarding falls and L LE pain. Number of Personal Factors/Comorbidities that affect the Plan of Care: 1-2: MODERATE COMPLEXITY   EXAMINATION:   Most Recent Physical Functioning:   Gross Assessment:                  Posture:     Balance:  Sitting: Intact  Standing: Impaired  Standing - Static: Good  Standing - Dynamic : Fair Bed Mobility:     Wheelchair Mobility:     Transfers:  Sit to Stand: Stand-by assistance  Stand to Sit: Stand-by assistance  Gait:     Base of Support: Shift to right  Speed/Rosalie: Slow  Step Length: Left shortened;Right shortened  Gait Abnormalities: Decreased step clearance;Trunk sway increased  Distance (ft): 250 Feet (ft)  Assistive Device: Walker, rolling  Ambulation - Level of Assistance: Stand-by assistance  Interventions: Safety awareness training;Verbal cues      Body Structures Involved:  1. Bones  2. Joints  3. Muscles Body Functions Affected:  1. Neuromusculoskeletal  2. Movement Related Activities and Participation Affected:  1. Mobility  2. Self Care   Number of elements that affect the Plan of Care: 4+: HIGH COMPLEXITY   CLINICAL PRESENTATION:   Presentation: Stable and uncomplicated: LOW COMPLEXITY   CLINICAL DECISION MAKIN [facility] [address] [id number] AM-PAC 6 Clicks   Basic Mobility Inpatient Short Form  How much difficulty does the patient currently have. .. Unable A Lot A Little None   1. Turning over in bed (including adjusting bedclothes, sheets and blankets)? [] 1   [] 2   [] 3   [x] 4   2. Sitting down on and standing up from a chair with arms ( e.g., wheelchair, bedside commode, etc.)   [] 1   [] 2   [x] 3   [] 4   3. Moving from lying on back to sitting on the side of the bed?    [] 1   [] 2   [x] 3   [] 4   How much help from another person does the patient currently need. .. Total A Lot A Little None   4. Moving to and from a bed to a chair (including a wheelchair)? [] 1   [] 2   [x] 3   [] 4   5. Need to walk in hospital room? [] 1   [] 2   [x] 3   [] 4   6. Climbing 3-5 steps with a railing? [] 1   [x] 2   [] 3   [] 4   © 2007, Trustees of [address] [place] [address] [address] [id number], under license to [employer]. All rights reserved      Score:  Initial: 18 Most Recent: 18 (Date: 2/23/18 )    Interpretation of Tool:  Represents activities that are increasingly more difficult (i.e. Bed mobility, Transfers, Gait). Score 24 23 22-20 19-15 14-10 9-7 6     Modifier CH CI CJ CK CL CM CN      ? Mobility - Walking and Moving Around:     - CURRENT STATUS: CK - 40%-59% impaired, limited or restricted    - GOAL STATUS: CJ - 20%-39% impaired, limited or restricted    - D/C STATUS:  ---------------To be determined---------------  Payor: BLUE CROSS / Plan: [place] BLUE CROSS [address] / Product Type: PPO /      Medical Necessity:     · Patient demonstrates good rehab potential due to higher previous functional level. Reason for Services/Other Comments:  · Patient continues to require skilled intervention due to decreased functional mobility and balance/gait status from baseline. .   Use of outcome tool(s) and clinical judgement create a POC that gives a: Clear prediction of patient's progress: LOW COMPLEXITY            TREATMENT:   (In addition to Assessment/Re-Assessment sessions the following treatments were rendered)   Pre-treatment Symptoms/Complaints:    Pain: Initial:   Pain Intensity 1: 0  Post Session:  0/10     Therapeutic Activity: (    14 Minutes): Therapeutic activities including transfer training, ambulation on level ground, static/dynamic standing balance activities, instruction in sequencing with rolling walker, scooting, and patient education to improve mobility, strength and balance.   Required minimal Safety awareness training;Verbal cues to promote static and dynamic balance in standing and promote coordination of bilateral, lower extremity(s). Group Therapeutic Exercise: (   Minutes):  Exercises per grid below to improve mobility, strength, balance and stiffness/soreness. Required moderate visual, verbal, manual and tactile cues to promote proper body alignment, promote proper body posture and promote proper body mechanics. Progressed range, repetitions and complexity of movement as indicated. Date:  2/26/18 Date:   Date:     ACTIVITY/EXERCISE AM PM AM PM AM PM   Ambulation:           Distance  Device  Duration         Seated Heel Raises x15B A x15B A       Seated Toe Raises x15B A x15B A       Seated Long Arc Quads x15B A x15B A       Seated Marching x15B A x15B A       Seated Hip Abduction x15B A x15B A       Seated heel slides x10L A x10L A       Seated quad sets x10L A x10L A       B = bilateral; AA = active assistive; A = active; P = passive        Braces/Orthotics/Lines/Etc:   · None  Treatment/Session Assessment:    · Response to Treatment:  See above. · Interdisciplinary Collaboration:   o Physical Therapy Assistant  o Registered Nurse  · After treatment position/precautions:   o Up in chair  o Bed alarm/tab alert on  o Bed/Chair-wheels locked  o Call light within reach  o RN notified  o All needs within reach   · Compliance with Program/Exercises: Will assess as treatment progresses. · Recommendations/Intent for next treatment session: \"Next visit will focus on advancements to more challenging activities and reduction in assistance provided\".   Total Treatment Duration:  PT Patient Time In/Time Out  Time In: 0921  Time Out: [address] [employer] [facility]

## 2018-02-28 NOTE — PROGRESS NOTES
Hospitalist Progress Note    2018  Admit Date: 2018  9:12 AM   NAME: Brea Pino   :  1955   DOS:              18  MRN:  [id number]   Attending: Carlton Rutledge MD  PCP:  None  Treatment Team: Attending Provider: Yumiko Denson. Bautista Reyna MD; Physician: Nancy Fonseca MD; Consulting Provider: Magdy Betancourt MD; Consulting Provider: Yung Magallanes MD; Care Manager: Zohra Borja RN; Consulting Provider: Otto Gupta MD    Full Code     SUBJECTIVE:   As previously documented: \" Mr. Dick Geller is a 57 y. o. male who has no significant PMHx who was admitted on 18 for left leg non purulent cellulitis. Of note, he lives in [place] and came to visit family members to [place], [place] on 18. He denied falls, prior knee or leg surgery. Duplex was was unremarkable for DVT. He was started on vancomycin. Has persistent Blood cultures positive for MRSA bacteremia. ECHO negative for vegetations. Due to poor improvement of his thigh and leg edema and pain he underwent an MRI of his leg showing multiloculated abscesses and findings of septic knee. He underwent I&D and knee lavage on 18 by general surgery. Orthopedics performed knee aspiration. So far, all BC and wound aspirate and cultures are positive for staph aureus. ID consulted. Plan for outpatient antibiotic completion at SNF/Rehab and wound care once Blood cultures are negative and he gets a PICC line. Patient still has persistent leukocytosis. He is being transferred to [facility] for further surgical intervention by Orthopedic group- Dr. Sofie Huizar".           18   Mr. Brea Pino  Is a 58 yo male with multiloculated abscesses and septic knee s/p lavage on vancomycin. He was ambulating well this AM and reports that his knee pain is well controlled. He is pending placement. 10+ ROS reviewed and negative except for positive in HPI.    No Known Allergies  Current Facility-Administered Medications   Medication Dose Route Frequency    [START ON 3/1/2018] vancomycin (VANCOCIN) 1500 mg in  ml infusion  1,500 mg IntraVENous Q12H    0.9% sodium chloride infusion 250 mL  250 mL IntraVENous PRN    hydrALAZINE (APRESOLINE) 20 mg/mL injection 10 mg  10 mg IntraVENous Q6H PRN    docusate sodium (COLACE) capsule 100 mg  100 mg Oral BID    lip protectant (BLISTEX) ointment   Topical PRN    sodium chloride (NS) flush 10 mL  10 mL InterCATHeter Q8H    heparin (porcine) pf 300 Units  300 Units InterCATHeter Q8H    sodium chloride (NS) flush 10 mL  10 mL InterCATHeter PRN    heparin (porcine) pf 300 Units  300 Units InterCATHeter PRN    acetaminophen (TYLENOL) tablet 650 mg  650 mg Oral Q6H PRN    naloxone (NARCAN) injection 0.4 mg  0.4 mg IntraVENous EVERY 2 MINUTES AS NEEDED    oxyCODONE-acetaminophen (PERCOCET) 5-325 mg per tablet 1 Tab  1 Tab Oral Q4H PRN    oxyCODONE-acetaminophen (PERCOCET) 5-325 mg per tablet 2 Tab  2 Tab Oral Q4H PRN    traMADol (ULTRAM) tablet 50 mg  50 mg Oral Q6H PRN         Immunization History   Administered Date(s) Administered    TB Skin Test (PPD) Intradermal 2018     Objective:     Patient Vitals for the past 24 hrs:   Temp Pulse Resp BP SpO2   18 1416 98.5 °F (36.9 °C) 97 19 124/66 98 %   18 1118 100.1 °F (37.8 °C) 97 18 128/82 100 %   18 0720 99.2 °F (37.3 °C) 93 18 142/87 98 %   18 0320 99.2 °F (37.3 °C) 93 18 132/77 96 %   18 2324 98.1 °F (36.7 °C) (!) 102 20 143/87 99 %   18 97.8 °F (36.6 °C) 96 18 133/77 95 %     Temp (24hrs), Av.8 °F (37.1 °C), Min:97.8 °F (36.6 °C), Max:100.1 °F (37.8 °C)    Oxygen Therapy  O2 Sat (%): 98 % (18 1416)  Pulse via Oximetry: 111 beats per minute (18 1700)  O2 Device: Room air (18 0711)  O2 Flow Rate (L/min): 3 l/min (18 1557)  Oxygen Therapy  O2 Sat (%): 98 % (18 1416)  Pulse via Oximetry: 111 beats per minute (18 1700)  O2 Device: Room air (18 0711)  O2 Flow Rate (L/min): 3 l/min (02/21/18 1557)    Physical Exam:  General:         Alert, cooperative, no acute  Distress. Low grade fevers   HEENT:               NCAT. No obvious deformity. Nares normal. No drainage  Lungs:         CTABL. No wheezing/rhonchi/rales  Cardiovascular:   RRR. No m/r/g. 2+ pitting pedal edema LLE. Trace edema RLE. . +2 PT/DT pulses b/l. Abdomen:       S/nt/nd. Bowel sounds normal. .   Skin:         No rashes or lesions. Not Jaundiced  Musculoskeletal: L knee with dressing, no discharge. No obvious bleeding. Enlarge left thigh on distal area. Neurologic:    AAOx3. Abilio Hof No gross focal deficit. Moves all extremities. Psychiatric:         Good mood. Normal affect.               DIAGNOSTIC STUDIES      Data Review:   Recent Results (from the past 24 hour(s))   HGB & HCT    Collection Time: 02/28/18  3:26 AM   Result Value Ref Range    HGB 8.2 (L) 13.6 - 17.2 g/dL    HCT 26.2 (L) 41.1 - 50.3 %   CBC W/O DIFF    Collection Time: 02/28/18  5:24 AM   Result Value Ref Range    WBC 11.1 4.3 - 11.1 K/uL    RBC 3.17 (L) 4.23 - 5.67 M/uL    HGB 8.4 (L) 13.6 - 17.2 g/dL    HCT 26.6 (L) 41.1 - 50.3 %    MCV 83.9 79.6 - 97.8 FL    MCH 26.5 26.1 - 32.9 PG    MCHC 31.6 31.4 - 35.0 g/dL    RDW 18.2 (H) 11.9 - 14.6 %    PLATELET 368 (H) 097 - 450 K/uL    MPV 9.1 (L) 10.8 - 14.1 FL   VANCOMYCIN, TROUGH    Collection Time: 02/28/18 10:53 AM   Result Value Ref Range    Vancomycin,trough 19.4 5 - 20 ug/mL   VITAMIN B12    Collection Time: 02/28/18 10:53 AM   Result Value Ref Range    Vitamin B12 587 193 - 986 pg/mL   FOLATE    Collection Time: 02/28/18 10:53 AM   Result Value Ref Range    Folate 18.9 (H) 3.1 - 17.5 ng/mL   CREATININE    Collection Time: 02/28/18 10:53 AM   Result Value Ref Range    Creatinine 1.03 0.8 - 1.5 MG/DL   RETICULOCYTE COUNT    Collection Time: 02/28/18 10:53 AM   Result Value Ref Range    Reticulocyte count 4.0 (H) 0.3 - 2.0 %    Absolute Retic Cnt. 0.1270 (H) 0.026 - 0.095 M/ul Immature Retic Fraction 26.4 (H) 2.3 - 13.4 %    Retic Hgb Conc. 25 (L) 29 - 35 pg   LD    Collection Time: 02/28/18 10:53 AM   Result Value Ref Range     (H) 110 - 210 U/L       All Micro Results     None          Imaging /Procedures /Studies:    MRI LLE 2/16/18 FINDINGS:   There are multiple fluid collections within the deep muscles of the  left leg particularly along the anterior and lateral thigh. One of these  components measures approximately 3.0 x 6.5 x 4.0 cm. There are smaller  components below the knee medially extending to the upper to mid calf. There is  a large joint effusion with peripheral enhancement. There is enhancement about  the joint space. There is marrow edema associated with the medial compartment  which is relatively symmetric to the contralateral side, more likely to be  degenerative rather than osteomyelitis. There is extensive muscular edema. There  is a large joint effusion on the right as well although postcontrast imaging of  this knee was not performed. IMPRESSION:  1. Multiloculated fluid collections within the left lower extremity most  compatible with abscesses. A large joint effusion is present with enhancement as  well as enhancement within the joint space therefore concerning for septic  arthritis. There are no definite findings of osteomyelitis. 2. Large right joint effusion. Echocardiogram 2/14/18: SUMMARY:  -  Left ventricle: Systolic function was normal. Ejection fraction was  estimated in the range of 55 % to 60 %. There were no regional wall motion  abnormalities. There was mild concentric hypertrophy. The E/e' ratio was 8.3.  -  Pericardium: There was no pericardial effusion.     Labs and Studies from previous 24 hours have been personally reviewed by myself [facility] Problems as of 2/28/2018  Date Reviewed: 2/20/2018          Codes Class Noted - Resolved POA    * (Principal)Septic arthritis of knee, left ([facility] ICD-10-CM: M00.9  ICD-9-CM: 711.06  2/21/2018 - Present Yes        MRSA bacteremia ICD-10-CM: R78.81  ICD-9-CM: 790.7, 041.12  2/21/2018 - Present Yes        Leukocytosis ICD-10-CM: D72.829  ICD-9-CM: 288.60  2/13/2018 - Present Yes        Hypoalbuminemia ICD-10-CM: E88.09  ICD-9-CM: 273.8  2/13/2018 - Present Yes        Anemia ICD-10-CM: D64.9  ICD-9-CM: 285.9  2/13/2018 - Present Yes        Left leg cellulitis ICD-10-CM: L03.116  ICD-9-CM: 682.6  2/13/2018 - Present Yes        Abnormal transaminases ICD-10-CM: R74.8  ICD-9-CM: 790.4  2/13/2018 - Present Yes              Plan:  L knee septic arthritis with MRSA bacteremia and leg cellulitis:  - aspirated on 2/16/18 and s/p I&D 2/21/18  - On IV vancomycin with EOT:3/19/18  - BC 2/2 from 2/19 negative   -TTE and MANJINDER  w/o vegetations. Plan  Continue Vancomycin 1g Q8H (EOT 3/19/2018)    Acute blood loss anemia:  - on Iron  supplementation  -  Hemoccult collection pending  - s/p 2PRBC yesterday - H/H again lower today. Repeat H/H to be sure is not a lab error. Check INR/PT/PTT - if confirmed with transfuse again 2 PRBCs. Plan  Evaluated by GI: microcytic anemia most likely secondary to anemia of chronic disease  No evidence of GI bleed    Thrombocytosis  Chronic thrombocytosis during duration of hospitalization  Plan  Peripheral smear pending  Hematology consult pending for thrombocytosis     Elevated liver enzymes:  - resolved - unclear etiology  - acute hepatitis panel negative     Debility :  -PT/OT/PPD/IRC  - will need rehab - Pending placement      DVT Prophylaxis:SCD  CODE Status: Full CODE  Plan of Care Discussed with: patient.  Care team.  Medical Risk: high  Disposition: pending rehab in the AM pending hematology clearance    Sabrina Gutierrez MD  02/28/18

## 2018-03-01 LAB
ANION GAP SERPL CALC-SCNC: 9 MMOL/L (ref 7–16)
BASOPHILS # BLD: 0 K/UL (ref 0–0.2)
BASOPHILS NFR BLD: 0 % (ref 0–2)
BUN SERPL-MCNC: 11 MG/DL (ref 8–23)
CALCIUM SERPL-MCNC: 9.5 MG/DL (ref 8.3–10.4)
CHLORIDE SERPL-SCNC: 105 MMOL/L (ref 98–107)
CO2 SERPL-SCNC: 26 MMOL/L (ref 21–32)
CREAT SERPL-MCNC: 0.98 MG/DL (ref 0.8–1.5)
DIFFERENTIAL METHOD BLD: ABNORMAL
EOSINOPHIL # BLD: 0.4 K/UL (ref 0–0.8)
EOSINOPHIL NFR BLD: 4 % (ref 0.5–7.8)
ERYTHROCYTE [DISTWIDTH] IN BLOOD BY AUTOMATED COUNT: 17.8 % (ref 11.9–14.6)
FIBRINOGEN PPP-MCNC: 649 MG/DL (ref 190–501)
GLUCOSE SERPL-MCNC: 102 MG/DL (ref 65–100)
HCT VFR BLD AUTO: 26 % (ref 41.1–50.3)
HGB BLD-MCNC: 8.1 G/DL (ref 13.6–17.2)
HIV 1+2 AB+HIV1 P24 AG SERPL QL IA: NON REACTIVE
IMM GRANULOCYTES # BLD: 0.1 K/UL (ref 0–0.5)
IMM GRANULOCYTES NFR BLD AUTO: 1 % (ref 0–5)
LYMPHOCYTES # BLD: 1.6 K/UL (ref 0.5–4.6)
LYMPHOCYTES NFR BLD: 16 % (ref 13–44)
MCH RBC QN AUTO: 26.1 PG (ref 26.1–32.9)
MCHC RBC AUTO-ENTMCNC: 31.2 G/DL (ref 31.4–35)
MCV RBC AUTO: 83.9 FL (ref 79.6–97.8)
MONOCYTES # BLD: 0.9 K/UL (ref 0.1–1.3)
MONOCYTES NFR BLD: 9 % (ref 4–12)
NEUTS SEG # BLD: 7.2 K/UL (ref 1.7–8.2)
NEUTS SEG NFR BLD: 70 % (ref 43–78)
PLATELET # BLD AUTO: 511 K/UL (ref 150–450)
PMV BLD AUTO: 9.1 FL (ref 10.8–14.1)
POTASSIUM SERPL-SCNC: 3.8 MMOL/L (ref 3.5–5.1)
RBC # BLD AUTO: 3.1 M/UL (ref 4.23–5.67)
SODIUM SERPL-SCNC: 140 MMOL/L (ref 136–145)
TRANSFERRIN SERPL-SCNC: 44.4 NMOL/L (ref 12.2–27.3)
WBC # BLD AUTO: 10.2 K/UL (ref 4.3–11.1)

## 2018-03-01 PROCEDURE — 80048 BASIC METABOLIC PNL TOTAL CA: CPT | Performed by: HOSPITALIST

## 2018-03-01 PROCEDURE — 97530 THERAPEUTIC ACTIVITIES: CPT

## 2018-03-01 PROCEDURE — 74011250636 HC RX REV CODE- 250/636: Performed by: INTERNAL MEDICINE

## 2018-03-01 PROCEDURE — 74011250637 HC RX REV CODE- 250/637: Performed by: FAMILY MEDICINE

## 2018-03-01 PROCEDURE — 85384 FIBRINOGEN ACTIVITY: CPT | Performed by: NURSE PRACTITIONER

## 2018-03-01 PROCEDURE — 36592 COLLECT BLOOD FROM PICC: CPT

## 2018-03-01 PROCEDURE — 85025 COMPLETE CBC W/AUTO DIFF WBC: CPT | Performed by: INTERNAL MEDICINE

## 2018-03-01 PROCEDURE — 74011250636 HC RX REV CODE- 250/636: Performed by: NURSE PRACTITIONER

## 2018-03-01 PROCEDURE — 65270000029 HC RM PRIVATE

## 2018-03-01 PROCEDURE — 97150 GROUP THERAPEUTIC PROCEDURES: CPT

## 2018-03-01 RX ADMIN — ACETAMINOPHEN 650 MG: 325 TABLET ORAL at 17:22

## 2018-03-01 RX ADMIN — Medication 300 UNITS: at 04:59

## 2018-03-01 RX ADMIN — VANCOMYCIN HYDROCHLORIDE 1500 MG: 10 INJECTION, POWDER, LYOPHILIZED, FOR SOLUTION INTRAVENOUS at 01:16

## 2018-03-01 RX ADMIN — DOCUSATE SODIUM 100 MG: 100 CAPSULE, LIQUID FILLED ORAL at 08:45

## 2018-03-01 RX ADMIN — DOCUSATE SODIUM 100 MG: 100 CAPSULE, LIQUID FILLED ORAL at 17:22

## 2018-03-01 RX ADMIN — OXYCODONE HYDROCHLORIDE AND ACETAMINOPHEN 2 TABLET: 5; 325 TABLET ORAL at 08:45

## 2018-03-01 RX ADMIN — OXYCODONE HYDROCHLORIDE AND ACETAMINOPHEN 2 TABLET: 5; 325 TABLET ORAL at 05:10

## 2018-03-01 RX ADMIN — VANCOMYCIN HYDROCHLORIDE 1500 MG: 10 INJECTION, POWDER, LYOPHILIZED, FOR SOLUTION INTRAVENOUS at 13:03

## 2018-03-01 RX ADMIN — Medication 300 UNITS: at 15:41

## 2018-03-01 RX ADMIN — Medication 10 ML: at 15:41

## 2018-03-01 NOTE — PROGRESS NOTES
Met with pt at bedside, made pt aware of decline from [facility], requested referrals to [facility], [address] [place] [address] in [address] [place] [address] will await response.

## 2018-03-01 NOTE — PROGRESS NOTES
Infectious Disease Progress Note    Today's Date: 3/1/2018   Admit Date: 2018    Impression:   · MRSA bacteremia ( - ); repeat cultures  NG; TTE  negative for vegetation, MANJINDER NG  · MRSA pyomyositis left leg  · MRSA left knee septic arthritis, aspirated 18, s/p I&D   · Recent lumbar spine surgery with hardware, 10/2017 (in [place])  · Microcytic anemia with elevated ferritin - likely Chronic disease anemia or inflammatory anemia s/p GI eval  · Thrombocytosis    Plan:   · Continue IV vancomycin (currently on 1g Q8hrs: VT and Cr pending this afternoon)  · Duration: 4 weeks, EOT 3/20/18  Dispo: pending, [facility] declined. Please note ID does not follow patient at SNF, recommend Labs: Qmonday CBC with diff, Creatinine, ESR, CRP Vanc trough and Q Thursday Creatinine, vanc trough with routine PICC care    We will plan to see at EOT: ID follow up 3/20/18 @ 8:40am (to note pt lived in [place], was here visiting family)    Anti-infectives:   IV vancomycin    Subjective:     Has been up ambulating in the hallway, still with mild chills. No fever, reports mild back pain and stiffness which is better with walking. He is eager to know his DC plans. No Known Allergies     Review of Systems:  Pertinent items are noted in the History of Present Illness.     Objective:     Visit Vitals    /86 (BP 1 Location: Left arm, BP Patient Position: At rest)    Pulse 87    Temp 98.7 °F (37.1 °C)    Resp 17    Ht 5' 8\" (1.727 m)    Wt 113.4 kg (250 lb)    SpO2 94%    BMI 38.01 kg/m2     Temp (24hrs), Av.8 °F (37.1 °C), Min:98.1 °F (36.7 °C), Max:100.1 °F (37.8 °C)       Lines:   RUE PICC intact        Physical Exam:    General:  Alert, cooperative, obese   Eyes:  Sclera anicteric   Mouth/Throat: oral pharynx clear   Neck: Supple   Lungs:   CTA, room air   CV:  Regular rate and rhythm   Abdomen:   non-distended, normoactive BS   Extremities: RLE 1+ edema   Skin: No rashes/hives   Musculoskeletal: LLE edematous, but improved, tegaderm/giaze dressings in place, C/D/I   Lines/Devices:  Intact, no erythema, drainage or tenderness   Psych: Alert, oriented x3     Data Review:     CBC:  Recent Labs      18   0517  18   0524  18   0326   18   0624   WBC  10.2  11.1   --    --   11.8*   GRANS  70   --    --    --    --    MONOS  9   --    --    --    --    EOS  4   --    --    --    --    ANEU  7.2   --    --    --    --    ABL  1.6   --    --    --    --    HGB  8.1*  8.4*  8.2*   < >  9.4*   HCT  26.0*  26.6*  26.2*   < >  29.3*   PLT  511*  532*   --    --   538*    < > = values in this interval not displayed. BMP:  Recent Labs      18   0517  18   1053  18   0624   CREA  0.98  1.03  1.00   BUN  11   --   11   NA  140   --   139   K  3.8   --   3.9   CL  105   --   105   CO2  26   --   25   AGAP  9   --   9   GLU  102*   --   93       LFTS:  No results for input(s): TBILI, ALT, SGOT, AP, TP, ALB in the last 72 hours. Microbiology:     All Micro Results     None          Imagin/15/18 L knee film: IMPRESSION:  1. Large joint effusion and soft tissue edema without acute osseous abnormality. An infectious/inflammatory process of the left knee joint cannot be excluded  given the appearance. Recommend clinical correlation. 18 MRI left leg  IMPRESSION:  1. Multiloculated fluid collections within the left lower extremity most compatible with abscesses. A large joint effusion is present with enhancement as well as enhancement within the joint space therefore concerning for septic arthritis. There are no definite findings of osteomyelitis. 2. Large right joint effusion.     Signed By: Namrata Villegas NP     2018

## 2018-03-01 NOTE — INTERDISCIPLINARY ROUNDS
IDT Rounds completed with RN, Ruth Lau MD and Coord. Plan:  Per MD, creatinine is not improving, monitoring. Stated pt does not want dialysis.  MD to have family meeting

## 2018-03-01 NOTE — PROGRESS NOTES
Problem: Mobility Impaired (Adult and Pediatric)  Goal: *Acute Goals and Plan of Care (Insert Text)  LTG:  (1.)Mr. Samuel Wheeler will perform bed mobility with INDEPENDENCE within 7 treatment day(s). (2.)Mr. Smauel Wheeler will transfer from bed to chair and chair to bed with MODIFIED INDEPENDENCE using the least restrictive device within 7 treatment day(s). (3.)Mr. Samuel Wheeler will ambulate with MODIFIED INDEPENDENCE for 150+ feet with the least restrictive device within 7 treatment day(s). (4.)Mr. Samuel Wheeler will demonstrate GOOD DYNAMIC standing balance utilizing least restrictive assistive device within 7 treatment day(s). (5.)Mr. Samuel Wheeler will tolerate 25+ minutes of therapeutic activity/exercise while maintaining stable vitals to improve functional strength and endurance within 7 day(s). _______________________________________________________________________________________________  PHYSICAL THERAPY: Daily Note, Treatment Day: 3rd, AM 3/1/2018  INPATIENT: Hospital Day: 9  Payor: BLUE CROSS / [place] [facility] / Product Type: PPO /      Paticia Sulaiman in place at all times. NAME/AGE/GENDER: Ovidio Dennison is a 57 y.o. male   PRIMARY DIAGNOSIS: Open wound of left knee, initial encounter [S81.002A] Septic arthritis of knee, left (HCC) Septic arthritis of knee, left (HCC)  Procedure(s) (LRB):  IRRIGATION AND DEBRIDEMENT LEFT KNEE/ BEING ADMITTED FROM [place] [address] ON 2.20 (Left)  8 Days Post-Op  ICD-10: Treatment Diagnosis:   · Generalized Muscle Weakness (M62.81)  · Difficulty in walking, Not elsewhere classified (R26.2)   Precaution/Allergies:  Review of patient's allergies indicates no known allergies. ASSESSMENT:     Mr. Samuel Wheeler is a 57year old male admitted with left knee infection and 2 day s/p L knee irigation and debridement. Patient was sitting up in straight back chair upon contact and agreeable to PT.  Patient able to transfer to standing with Supervision and ambulate 300' with use of rolling walker, Supervision, and occasional cues for sequencing with rolling walker. Patient was later seen ambulating in the hallways with use of rolling walker. Patient later participates in group therapeutic strengthening exercises to improve functional strength for transfers, gait and overall mobility. Patient requires cues and assistance to perform exercises correctly. Overall slow, steady progress towards physical therapy goals. No goals have been met thus far. Will continue efforts. This section established at most recent assessment   PROBLEM LIST (Impairments causing functional limitations):  1. Decreased Strength  2. Decreased ADL/Functional Activities  3. Decreased Transfer Abilities  4. Decreased Ambulation Ability/Technique  5. Decreased Balance  6. Increased Pain  7. Decreased Activity Tolerance  8. Decreased Flexibility/Joint Mobility  9. Decreased Knowledge of Precautions  10. Decreased [place] with Home Exercise Program   INTERVENTIONS PLANNED: (Benefits and precautions of physical therapy have been discussed with the patient.)  1. Balance Exercise  2. Bed Mobility  3. Family Education  4. Gait Training  5. Range of Motion (ROM)  6. Therapeutic Activites  7. Transfer Training  8. Group Therapy     TREATMENT PLAN: Frequency/Duration: 3 times a week for duration of hospital stay  Rehabilitation Potential For Stated Goals: GOOD     RECOMMENDED REHABILITATION/EQUIPMENT: (at time of discharge pending progress): Due to the probability of continued deficits (see above) this patient will likely need continued skilled physical therapy after discharge. Equipment:    Walkers, Type: Rolling Walker              HISTORY:   History of Present Injury/Illness (Reason for Referral):  S/p left knee I&D  Past Medical History/Comorbidities:   Mr. William Castillo  has no past medical history on file. Mr. William Castillo  has a past surgical history that includes hx orthopaedic.   Social History/Living Environment:   Home Environment: Private residence  Wheelchair Ramp: Yes  One/Two Story Residence: One story  Living Alone: Yes  Support Systems: Family member(s)  Patient Expects to be Discharged to[de-identified] Private residence  Current DME Used/Available at Home: Cane, straight  Prior Level of Function/Work/Activity:   Patient lives alone in a single story residence with 0 steps to enter. At baseline, patient is independent with ADLs, ambulates without utilizing an assistive device, and endorses 0 recent falls. Patient with difficult history to fall regarding falls and L LE pain. Number of Personal Factors/Comorbidities that affect the Plan of Care: 1-2: MODERATE COMPLEXITY   EXAMINATION:   Most Recent Physical Functioning:   Gross Assessment:                  Posture:     Balance:  Sitting: Intact  Standing: Impaired  Standing - Static: Good  Standing - Dynamic : Fair Bed Mobility:     Wheelchair Mobility:     Transfers:  Sit to Stand: Supervision  Stand to Sit: Supervision  Gait:     Base of Support: Shift to right  Speed/Rosalie: Slow  Step Length: Left shortened;Right shortened  Gait Abnormalities: Decreased step clearance;Trunk sway increased  Distance (ft): 300 Feet (ft)  Assistive Device: Walker, rolling  Ambulation - Level of Assistance: Supervision  Interventions: Safety awareness training;Verbal cues      Body Structures Involved:  1. Bones  2. Joints  3. Muscles Body Functions Affected:  1. Neuromusculoskeletal  2. Movement Related Activities and Participation Affected:  1. Mobility  2. Self Care   Number of elements that affect the Plan of Care: 4+: HIGH COMPLEXITY   CLINICAL PRESENTATION:   Presentation: Stable and uncomplicated: LOW COMPLEXITY   CLINICAL DECISION MAKIN [facility] [address] [id number] AM-PAC 6 Clicks   Basic Mobility Inpatient Short Form  How much difficulty does the patient currently have. .. Unable A Lot A Little None   1. Turning over in bed (including adjusting bedclothes, sheets and blankets)?    [] 1   [] 2   [] 3   [x] 4 2.  Sitting down on and standing up from a chair with arms ( e.g., wheelchair, bedside commode, etc.)   [] 1   [] 2   [x] 3   [] 4   3. Moving from lying on back to sitting on the side of the bed? [] 1   [] 2   [x] 3   [] 4   How much help from another person does the patient currently need. .. Total A Lot A Little None   4. Moving to and from a bed to a chair (including a wheelchair)? [] 1   [] 2   [x] 3   [] 4   5. Need to walk in hospital room? [] 1   [] 2   [x] 3   [] 4   6. Climbing 3-5 steps with a railing? [] 1   [x] 2   [] 3   [] 4   © 2007, Trustees of [address] [place] [address] [address] [address], under license to [employer]. All rights reserved      Score:  Initial: 18 Most Recent: 18 (Date: 2/23/18 )    Interpretation of Tool:  Represents activities that are increasingly more difficult (i.e. Bed mobility, Transfers, Gait). Score 24 23 22-20 19-15 14-10 9-7 6     Modifier CH CI CJ CK CL CM CN      ? Mobility - Walking and Moving Around:     - CURRENT STATUS: CK - 40%-59% impaired, limited or restricted    - GOAL STATUS: CJ - 20%-39% impaired, limited or restricted    - D/C STATUS:  ---------------To be determined---------------  Payor: BLUE CR[place] / [place] [address] / Product Type: PPO /      Medical Necessity:     · Patient demonstrates good rehab potential due to higher previous functional level. Reason for Services/Other Comments:  · Patient continues to require skilled intervention due to decreased functional mobility and balance/gait status from baseline. .   Use of outcome tool(s) and clinical judgement create a POC that gives a: Clear prediction of patient's progress: LOW COMPLEXITY            TREATMENT:   (In addition to Assessment/Re-Assessment sessions the following treatments were rendered)   Pre-treatment Symptoms/Complaints:    Pain: Initial:   Pain Intensity 1: 0  Post Session:  0/10     Therapeutic Activity: (    10 Minutes):   Therapeutic activities including transfer training, ambulation on level ground, static/dynamic standing balance activities, instruction in sequencing with rolling walker, scooting, and patient education to improve mobility, strength and balance. Required minimal Safety awareness training;Verbal cues to promote static and dynamic balance in standing and promote coordination of bilateral, lower extremity(s). Group Therapeutic Exercise: ( 10  Minutes):  Exercises per grid below to improve mobility, strength, balance and stiffness/soreness. Required moderate visual, verbal, manual and tactile cues to promote proper body alignment, promote proper body posture and promote proper body mechanics. Progressed range, repetitions and complexity of movement as indicated. Date:  2/26/18 Date:  3/1/18 Date:     ACTIVITY/EXERCISE AM PM AM PM AM PM   Ambulation:           Distance  Device  Duration         Seated Heel Raises x15B A x15B A x15B A      Seated Toe Raises x15B A x15B A x15B A      Seated Long Arc Quads x15B A x15B A x15B A      Seated Marching x15B A x15B A x15B A      Seated Hip Abduction x15B A x15B A x15B A      Seated heel slides x10L A x10L A x10L A      Seated quad sets x10L A x10L A       B = bilateral; AA = active assistive; A = active; P = passive        Braces/Orthotics/Lines/Etc:   · None  Treatment/Session Assessment:    · Response to Treatment:  See above. · Interdisciplinary Collaboration:   o Physical Therapy Assistant  o Registered Nurse  · After treatment position/precautions:   o Up in chair  o Bed alarm/tab alert on  o Bed/Chair-wheels locked  o Call light within reach  o RN notified  o All needs within reach   · Compliance with Program/Exercises: Will assess as treatment progresses. · Recommendations/Intent for next treatment session: \"Next visit will focus on advancements to more challenging activities and reduction in assistance provided\".   Total Treatment Duration:  PT Patient Time In/Time Out  Time In: 0830 (1140)  Time Out: 0840 (1150)    Ced Mercer, PTA

## 2018-03-01 NOTE — PROGRESS NOTES
Attempted to see patient, but patient not in room. Thrombocytosis is reactive d/t infection/sepsis. Hgb stable. No hemolysis. Soluble transferrin receptor still pending. OK for discharge from hematologic standpoint.

## 2018-03-01 NOTE — PROGRESS NOTES
Problem: Falls - Risk of  Goal: *Absence of Falls  Document Kinjal Fall Risk and appropriate interventions in the flowsheet.    Outcome: Progressing Towards Goal  Fall Risk Interventions:  Mobility Interventions: Bed/chair exit alarm, Communicate number of staff needed for ambulation/transfer, Patient to call before getting OOB    Mentation Interventions: Adequate sleep, hydration, pain control, Bed/chair exit alarm, Door open when patient unattended, Evaluate medications/consider consulting pharmacy, Eyeglasses and hearing aids, Familiar objects from home, Family/sitter at bedside, Gait belt with transfers/ambulation, Increase mobility, More frequent rounding, Reorient patient, Room close to nurse's station, Self-releasing belt, Toileting rounds, Update white board    Medication Interventions: Assess postural VS orthostatic hypotension, Bed/chair exit alarm, Teach patient to arise slowly    Elimination Interventions: Bed/chair exit alarm, Call light in reach, Toileting schedule/hourly rounds

## 2018-03-01 NOTE — PROGRESS NOTES
Hospitalist Progress Note    3/1/2018  Admit Date: 2018  9:12 AM   NAME: Lj Vanegas   :  1955   DOS:              18  MRN:  [id number]   Attending: Garett Bets MD  PCP:  None  Treatment Team: Attending Provider: Marion High. Landis Landau, MD; Physician: Ralph Hutson MD; Consulting Provider: Gaetano Song MD; Consulting Provider: Reilly Robles MD; Care Manager: Ruby Jain RN; Consulting Provider: Elena Connell MD; Care Manager: Kirsten Marcial RN    Full Code     SUBJECTIVE:   As previously documented: \" Mr. Kayla Geller is a 57 y. o. male who has no significant PMHx who was admitted on 18 for left leg non purulent cellulitis. Of note, he lives in [place] and came to visit family members to [facility] on 18. He denied falls, prior knee or leg surgery. Duplex was was unremarkable for DVT. He was started on vancomycin. Has persistent Blood cultures positive for MRSA bacteremia. ECHO negative for vegetations. Due to poor improvement of his thigh and leg edema and pain he underwent an MRI of his leg showing multiloculated abscesses and findings of septic knee. He underwent I&D and knee lavage on 18 by general surgery. Orthopedics performed knee aspiration. So far, all BC and wound aspirate and cultures are positive for staph aureus. ID consulted. Plan for outpatient antibiotic completion at SNF/Rehab and wound care once Blood cultures are negative and he gets a PICC line. Patient still has persistent leukocytosis. He is being transferred to [facility] for further surgical intervention by Orthopedic group- Dr. Darryle Cuna".           18   Mr. Lj Vanegas  Is a 58 yo male with multiloculated abscesses and septic knee s/p lavage on vancomycin. He was ambulating well this AM and reports that his knee pain is well controlled. He denies any dyspnea. 10+ ROS reviewed and negative except for positive in HPI.    No Known Allergies  Current Facility-Administered Medications   Medication Dose Route Frequency    [START ON 3/2/2018] Vancomycin Trough Reminder   Other ONCE    vancomycin (VANCOCIN) 1500 mg in  ml infusion  1,500 mg IntraVENous Q12H    0.9% sodium chloride infusion 250 mL  250 mL IntraVENous PRN    hydrALAZINE (APRESOLINE) 20 mg/mL injection 10 mg  10 mg IntraVENous Q6H PRN    docusate sodium (COLACE) capsule 100 mg  100 mg Oral BID    lip protectant (BLISTEX) ointment   Topical PRN    sodium chloride (NS) flush 10 mL  10 mL InterCATHeter Q8H    heparin (porcine) pf 300 Units  300 Units InterCATHeter Q8H    sodium chloride (NS) flush 10 mL  10 mL InterCATHeter PRN    heparin (porcine) pf 300 Units  300 Units InterCATHeter PRN    acetaminophen (TYLENOL) tablet 650 mg  650 mg Oral Q6H PRN    naloxone (NARCAN) injection 0.4 mg  0.4 mg IntraVENous EVERY 2 MINUTES AS NEEDED    oxyCODONE-acetaminophen (PERCOCET) 5-325 mg per tablet 1 Tab  1 Tab Oral Q4H PRN    oxyCODONE-acetaminophen (PERCOCET) 5-325 mg per tablet 2 Tab  2 Tab Oral Q4H PRN    traMADol (ULTRAM) tablet 50 mg  50 mg Oral Q6H PRN         Immunization History   Administered Date(s) Administered    TB Skin Test (PPD) Intradermal 2018     Objective:     Patient Vitals for the past 24 hrs:   Temp Pulse Resp BP SpO2   18 1056 98.6 °F (37 °C) 95 16 142/85 92 %   18 0816 98.7 °F (37.1 °C) 87 17 161/86 94 %   18 0356 98.2 °F (36.8 °C) 95 18 141/77 96 %   18 2323 98.1 °F (36.7 °C) (!) 105 20 135/90 96 %   18 1934 99.3 °F (37.4 °C) 100 18 132/75 96 %     Temp (24hrs), Av.6 °F (37 °C), Min:98.1 °F (36.7 °C), Max:99.3 °F (37.4 °C)    Oxygen Therapy  O2 Sat (%): 92 % (18 1056)  Pulse via Oximetry: 111 beats per minute (18 1700)  O2 Device: Room air (18 0711)  O2 Flow Rate (L/min): 3 l/min (18 1557)  Oxygen Therapy  O2 Sat (%): 92 % (18 1056)  Pulse via Oximetry: 111 beats per minute (18 1700)  O2 Device: Room air (02/24/18 0711)  O2 Flow Rate (L/min): 3 l/min (02/21/18 6039)    Physical Exam:  General:         Alert, cooperative, no acute  Distress. Low grade fevers   HEENT:               NCAT. No obvious deformity. Nares normal. No drainage  Lungs:         CTABL. No wheezing/rhonchi/rales  Cardiovascular:   RRR. No m/r/g. 2+ pitting pedal edema LLE. Trace edema RLE. . +2 PT/DT pulses b/l. Abdomen:       S/nt/nd. Bowel sounds normal. .   Skin:         No rashes or lesions. Not Jaundiced  Musculoskeletal: L knee with dressing, no discharge. No obvious bleeding. Enlarge left thigh on distal area. Neurologic:    AAOx3. Demian Deaner No gross focal deficit. Moves all extremities. Psychiatric:         Good mood. Normal affect. DIAGNOSTIC STUDIES      Data Review:   Recent Results (from the past 24 hour(s))   METABOLIC PANEL, BASIC    Collection Time: 03/01/18  5:17 AM   Result Value Ref Range    Sodium 140 136 - 145 mmol/L    Potassium 3.8 3.5 - 5.1 mmol/L    Chloride 105 98 - 107 mmol/L    CO2 26 21 - 32 mmol/L    Anion gap 9 7 - 16 mmol/L    Glucose 102 (H) 65 - 100 mg/dL    BUN 11 8 - 23 MG/DL    Creatinine 0.98 0.8 - 1.5 MG/DL    GFR est AA >60 >60 ml/min/1.73m2    GFR est non-AA >60 >60 ml/min/1.73m2    Calcium 9.5 8.3 - 10.4 MG/DL   CBC WITH AUTOMATED DIFF    Collection Time: 03/01/18  5:17 AM   Result Value Ref Range    WBC 10.2 4.3 - 11.1 K/uL    RBC 3.10 (L) 4.23 - 5.67 M/uL    HGB 8.1 (L) 13.6 - 17.2 g/dL    HCT 26.0 (L) 41.1 - 50.3 %    MCV 83.9 79.6 - 97.8 FL    MCH 26.1 26.1 - 32.9 PG    MCHC 31.2 (L) 31.4 - 35.0 g/dL    RDW 17.8 (H) 11.9 - 14.6 %    PLATELET 986 (H) 668 - 450 K/uL    MPV 9.1 (L) 10.8 - 14.1 FL    DF AUTOMATED      NEUTROPHILS 70 43 - 78 %    LYMPHOCYTES 16 13 - 44 %    MONOCYTES 9 4.0 - 12.0 %    EOSINOPHILS 4 0.5 - 7.8 %    BASOPHILS 0 0.0 - 2.0 %    IMMATURE GRANULOCYTES 1 0.0 - 5.0 %    ABS. NEUTROPHILS 7.2 1.7 - 8.2 K/UL    ABS. LYMPHOCYTES 1.6 0.5 - 4.6 K/UL    ABS. MONOCYTES 0.9 0.1 - 1.3 K/UL    ABS. EOSINOPHILS 0.4 0.0 - 0.8 K/UL    ABS. BASOPHILS 0.0 0.0 - 0.2 K/UL    ABS. IMM. GRANS. 0.1 0.0 - 0.5 K/UL   FIBRINOGEN    Collection Time: 03/01/18 11:45 AM   Result Value Ref Range    Fibrinogen 649 (H) 190 - 501 mg/dL       All Micro Results     None          Imaging /Procedures /Studies:    MRI LLE 2/16/18 FINDINGS:   There are multiple fluid collections within the deep muscles of the  left leg particularly along the anterior and lateral thigh. One of these  components measures approximately 3.0 x 6.5 x 4.0 cm. There are smaller  components below the knee medially extending to the upper to mid calf. There is  a large joint effusion with peripheral enhancement. There is enhancement about  the joint space. There is marrow edema associated with the medial compartment  which is relatively symmetric to the contralateral side, more likely to be  degenerative rather than osteomyelitis. There is extensive muscular edema. There  is a large joint effusion on the right as well although postcontrast imaging of  this knee was not performed. IMPRESSION:  1. Multiloculated fluid collections within the left lower extremity most  compatible with abscesses. A large joint effusion is present with enhancement as  well as enhancement within the joint space therefore concerning for septic  arthritis. There are no definite findings of osteomyelitis. 2. Large right joint effusion. Echocardiogram 2/14/18: SUMMARY:  -  Left ventricle: Systolic function was normal. Ejection fraction was  estimated in the range of 55 % to 60 %. There were no regional wall motion  abnormalities. There was mild concentric hypertrophy. The E/e' ratio was 8.3.  -  Pericardium: There was no pericardial effusion.     Labs and Studies from previous 24 hours have been personally reviewed by myself 107 WISErg Problems as of 3/1/2018  Date Reviewed: 2/20/2018          Codes Class Noted - Resolved POA    * (Principal)Septic arthritis of knee, left ([address] ICD-10-CM: M00.9  ICD-9-CM: 711.06  2/21/2018 - Present Yes        MRSA bacteremia ICD-10-CM: R78.81  ICD-9-CM: 790.7, 041.12  2/21/2018 - Present Yes        Leukocytosis ICD-10-CM: D72.829  ICD-9-CM: 288.60  2/13/2018 - Present Yes        Hypoalbuminemia ICD-10-CM: E88.09  ICD-9-CM: 273.8  2/13/2018 - Present Yes        Anemia ICD-10-CM: D64.9  ICD-9-CM: 285.9  2/13/2018 - Present Yes        Left leg cellulitis ICD-10-CM: L03.116  ICD-9-CM: 682.6  2/13/2018 - Present Yes        Abnormal transaminases ICD-10-CM: R74.8  ICD-9-CM: 790.4  2/13/2018 - Present Yes              Plan:  L knee septic arthritis with MRSA bacteremia and leg cellulitis:  - aspirated on 2/16/18   - On IV vancomycin with EOT:3/19/18  - BC 2/2 from 2/19 negative   -TTE and MANJINDER  w/o vegetations. S/p  debridement on 2/21/2018 of left knee and thigh with delayed primary closure  Plan  Continue Vancomycin 1g Q8H (EOT 3/19/2018)          Acute blood loss anemia:  - on Iron  supplementation  -  Hemoccult collection pending  - s/p 3 units PRBC inpatient  Plan  Evaluated by GI: microcytic anemia most likely secondary to anemia of chronic disease  No evidence of GI bleed    Thrombocytosis  Chronic thrombocytosis during duration of hospitalization  Plan  Hematology consult : Most likely reactive thrombocytosis      Elevated liver enzymes:  - resolved - unclear etiology  - acute hepatitis panel negative     Debility :  -PT/OT/PPD/IRC  - will need rehab - Pending placement      DVT Prophylaxis:SCD  CODE Status: Full CODE  Plan of Care Discussed with: patient.  Care team.  Medical Risk: high  Disposition: pending rehab     William Velasquez MD  03/01/18

## 2018-03-01 NOTE — CDMP QUERY
Please clarify if this patient was treated with:    EXCISIONAL DEBRIDEMENT OF SUBCUTANEOUS  TISSUE AND FASCIA (Excisional debridement is the surgical removal or cutting away of such tissue, necrosis, or slough) OR NON-EXCISIONAL DEBRIDEMENTOF SUBCUTANEOUS  TISSUE AND FASCIA (Nonexcisional debridement is the nonoperative brushing, irrigating, scrubbing, or washing of devitalized tissue, necrosis, slough, or foreign material)     As noted in the Op Note: \"The knee was sharply debrided, as was the thigh. No additional purulence was noted. Irrigation was performed in the form of pulse lavage. \"    Please clarify and document your clinical opinion in the progress notes and discharge summary including the definitive and/or presumptive diagnosis, (suspected or probable), related to the above clinical findings. Please include clinical findings supporting your diagnosis.     Thanks,  ALEC JeanN, RN, CDS  Compliant Documentation Management Program  [phone]

## 2018-03-02 LAB
ANION GAP SERPL CALC-SCNC: 9 MMOL/L (ref 7–16)
BASOPHILS # BLD: 0.1 K/UL (ref 0–0.2)
BASOPHILS NFR BLD: 1 % (ref 0–2)
BUN SERPL-MCNC: 13 MG/DL (ref 8–23)
CALCIUM SERPL-MCNC: 10.1 MG/DL (ref 8.3–10.4)
CHLORIDE SERPL-SCNC: 105 MMOL/L (ref 98–107)
CO2 SERPL-SCNC: 27 MMOL/L (ref 21–32)
CREAT SERPL-MCNC: 0.94 MG/DL (ref 0.8–1.5)
DIFFERENTIAL METHOD BLD: ABNORMAL
EOSINOPHIL # BLD: 0.3 K/UL (ref 0–0.8)
EOSINOPHIL NFR BLD: 4 % (ref 0.5–7.8)
ERYTHROCYTE [DISTWIDTH] IN BLOOD BY AUTOMATED COUNT: 17.9 % (ref 11.9–14.6)
GLUCOSE SERPL-MCNC: 99 MG/DL (ref 65–100)
HCT VFR BLD AUTO: 27.7 % (ref 41.1–50.3)
HGB BLD-MCNC: 8.5 G/DL (ref 13.6–17.2)
IMM GRANULOCYTES # BLD: 0 K/UL (ref 0–0.5)
IMM GRANULOCYTES NFR BLD AUTO: 0 % (ref 0–5)
LYMPHOCYTES # BLD: 1.3 K/UL (ref 0.5–4.6)
LYMPHOCYTES NFR BLD: 14 % (ref 13–44)
MCH RBC QN AUTO: 25.8 PG (ref 26.1–32.9)
MCHC RBC AUTO-ENTMCNC: 30.7 G/DL (ref 31.4–35)
MCV RBC AUTO: 84.2 FL (ref 79.6–97.8)
MONOCYTES # BLD: 0.7 K/UL (ref 0.1–1.3)
MONOCYTES NFR BLD: 8 % (ref 4–12)
NEUTS SEG # BLD: 6.8 K/UL (ref 1.7–8.2)
NEUTS SEG NFR BLD: 73 % (ref 43–78)
PLATELET # BLD AUTO: 538 K/UL (ref 150–450)
PMV BLD AUTO: 9.4 FL (ref 10.8–14.1)
POTASSIUM SERPL-SCNC: 3.8 MMOL/L (ref 3.5–5.1)
RBC # BLD AUTO: 3.29 M/UL (ref 4.23–5.67)
SODIUM SERPL-SCNC: 141 MMOL/L (ref 136–145)
VANCOMYCIN TROUGH SERPL-MCNC: 15.9 UG/ML (ref 5–20)
WBC # BLD AUTO: 9.3 K/UL (ref 4.3–11.1)

## 2018-03-02 PROCEDURE — 36592 COLLECT BLOOD FROM PICC: CPT

## 2018-03-02 PROCEDURE — 65270000029 HC RM PRIVATE

## 2018-03-02 PROCEDURE — 80048 BASIC METABOLIC PNL TOTAL CA: CPT | Performed by: INTERNAL MEDICINE

## 2018-03-02 PROCEDURE — 97530 THERAPEUTIC ACTIVITIES: CPT

## 2018-03-02 PROCEDURE — 97150 GROUP THERAPEUTIC PROCEDURES: CPT

## 2018-03-02 PROCEDURE — 85025 COMPLETE CBC W/AUTO DIFF WBC: CPT | Performed by: INTERNAL MEDICINE

## 2018-03-02 PROCEDURE — 74011250636 HC RX REV CODE- 250/636: Performed by: NURSE PRACTITIONER

## 2018-03-02 PROCEDURE — 77030008031

## 2018-03-02 PROCEDURE — 74011250636 HC RX REV CODE- 250/636: Performed by: INTERNAL MEDICINE

## 2018-03-02 PROCEDURE — 74011250637 HC RX REV CODE- 250/637: Performed by: FAMILY MEDICINE

## 2018-03-02 PROCEDURE — 80202 ASSAY OF VANCOMYCIN: CPT | Performed by: INTERNAL MEDICINE

## 2018-03-02 RX ADMIN — Medication 10 ML: at 01:23

## 2018-03-02 RX ADMIN — DOCUSATE SODIUM 100 MG: 100 CAPSULE, LIQUID FILLED ORAL at 17:49

## 2018-03-02 RX ADMIN — Medication 10 ML: at 15:25

## 2018-03-02 RX ADMIN — Medication 300 UNITS: at 23:26

## 2018-03-02 RX ADMIN — Medication 10 ML: at 05:45

## 2018-03-02 RX ADMIN — VANCOMYCIN HYDROCHLORIDE 1500 MG: 10 INJECTION, POWDER, LYOPHILIZED, FOR SOLUTION INTRAVENOUS at 13:01

## 2018-03-02 RX ADMIN — OXYCODONE HYDROCHLORIDE AND ACETAMINOPHEN 2 TABLET: 5; 325 TABLET ORAL at 08:19

## 2018-03-02 RX ADMIN — OXYCODONE HYDROCHLORIDE AND ACETAMINOPHEN 1 TABLET: 5; 325 TABLET ORAL at 17:49

## 2018-03-02 RX ADMIN — VANCOMYCIN HYDROCHLORIDE 1500 MG: 10 INJECTION, POWDER, LYOPHILIZED, FOR SOLUTION INTRAVENOUS at 23:26

## 2018-03-02 RX ADMIN — DOCUSATE SODIUM 100 MG: 100 CAPSULE, LIQUID FILLED ORAL at 08:13

## 2018-03-02 RX ADMIN — Medication 10 ML: at 23:34

## 2018-03-02 RX ADMIN — OXYCODONE HYDROCHLORIDE AND ACETAMINOPHEN 1 TABLET: 5; 325 TABLET ORAL at 23:26

## 2018-03-02 RX ADMIN — Medication 300 UNITS: at 15:25

## 2018-03-02 RX ADMIN — Medication 300 UNITS: at 01:23

## 2018-03-02 RX ADMIN — Medication 300 UNITS: at 05:45

## 2018-03-02 RX ADMIN — VANCOMYCIN HYDROCHLORIDE 1500 MG: 10 INJECTION, POWDER, LYOPHILIZED, FOR SOLUTION INTRAVENOUS at 01:23

## 2018-03-02 NOTE — PROGRESS NOTES
Problem: Mobility Impaired (Adult and Pediatric)  Goal: *Acute Goals and Plan of Care (Insert Text)  LTG:  (1.)Mr. Pardeep Hubbard will perform bed mobility with INDEPENDENCE within 7 treatment day(s). (2.)Mr. Pardeep Hubbard will transfer from bed to chair and chair to bed with MODIFIED INDEPENDENCE using the least restrictive device within 7 treatment day(s). (3.)Mr. Pardeep Hubbard will ambulate with MODIFIED INDEPENDENCE for 150+ feet with the least restrictive device within 7 treatment day(s). (4.)Mr. Pardeep Hubbard will demonstrate GOOD DYNAMIC standing balance utilizing least restrictive assistive device within 7 treatment day(s). (5.)Mr. Pardeep Hubbard will tolerate 25+ minutes of therapeutic activity/exercise while maintaining stable vitals to improve functional strength and endurance within 7 day(s). _______________________________________________________________________________________________  PHYSICAL THERAPY: Daily Note, Treatment Day: 4th, AM 3/2/2018  INPATIENT: Hospital Day: 10  Payor[place] BLUE CROSS / [place] [facility] / Product Type: PPO /      Luna Rave in place at all times. NAME/AGE/GENDER: Brea Pino is a 57 y.o. male   PRIMARY DIAGNOSIS: Open wound of left knee, initial encounter [S81.002A] Septic arthritis of knee, left (HCC) Septic arthritis of knee, left (HCC)  Procedure(s) (LRB):  IRRIGATION AND DEBRIDEMENT LEFT KNEE/ BEING ADMITTED FROM [place] [id number] ON 2.20 (Left)  9 Days Post-Op  ICD-10: Treatment Diagnosis:   · Generalized Muscle Weakness (M62.81)  · Difficulty in walking, Not elsewhere classified (R26.2)   Precaution/Allergies:  Review of patient's allergies indicates no known allergies. ASSESSMENT:     Mr. Pardeep Hubbard is a 57year old male admitted with left knee infection and 2 day s/p L knee irigation and debridement. Patient was sitting up in straight back chair upon contact and agreeable to PT.  Patient able to transfer to standing with mod I and ambulate 300' with use of rolling walker, mod I, and occasional cues for sequencing with rolling walker. Patient then participates in group therapeutic strengthening exercises to improve functional strength for transfers, gait and overall mobility. Patient requires cues and assistance to perform exercises correctly. Left knee ROM measured at 70 degrees flexion and -10 degrees extension. Encouraged patient to continue to ambulate in hallways throughout the day. Patient continues to be functioning well below his baseline as he does not usually ambulate with an assistive device. Would benefit from continued skilled PT post acute care stay. Overall slow, steady progress towards physical therapy goals. No goals have been met thus far. Will continue efforts. This section established at most recent assessment   PROBLEM LIST (Impairments causing functional limitations):  1. Decreased Strength  2. Decreased ADL/Functional Activities  3. Decreased Transfer Abilities  4. Decreased Ambulation Ability/Technique  5. Decreased Balance  6. Increased Pain  7. Decreased Activity Tolerance  8. Decreased Flexibility/Joint Mobility  9. Decreased Knowledge of Precautions  10. Decreased [place] with Home Exercise Program   INTERVENTIONS PLANNED: (Benefits and precautions of physical therapy have been discussed with the patient.)  1. Balance Exercise  2. Bed Mobility  3. Family Education  4. Gait Training  5. Range of Motion (ROM)  6. Therapeutic Activites  7. Transfer Training  8. Group Therapy     TREATMENT PLAN: Frequency/Duration: 3 times a week for duration of hospital stay  Rehabilitation Potential For Stated Goals: GOOD     RECOMMENDED REHABILITATION/EQUIPMENT: (at time of discharge pending progress): Due to the probability of continued deficits (see above) this patient will likely need continued skilled physical therapy after discharge.   Equipment:    Walkers, Type: Rolling Walker              HISTORY:   History of Present Injury/Illness (Reason for Referral):  S/p left knee I&D  Past Medical History/Comorbidities:   Mr. Iker Blanco  has no past medical history on file. Mr. Iker Blanco  has a past surgical history that includes hx orthopaedic. Social History/Living Environment:   Home Environment: Private residence  Wheelchair Ramp: Yes  One/Two Story Residence: One story  Living Alone: Yes  Support Systems: Family member(s)  Patient Expects to be Discharged to[de-identified] Private residence  Current DME Used/Available at Home: Cane, straight  Prior Level of Function/Work/Activity:   Patient lives alone in a single story residence with 0 steps to enter. At baseline, patient is independent with ADLs, ambulates without utilizing an assistive device, and endorses 0 recent falls. Patient with difficult history to fall regarding falls and L LE pain. Number of Personal Factors/Comorbidities that affect the Plan of Care: 1-2: MODERATE COMPLEXITY   EXAMINATION:   Most Recent Physical Functioning:   Gross Assessment:                  Posture:     Balance:  Sitting: Intact  Standing: Impaired  Standing - Static: Good  Standing - Dynamic : Fair Bed Mobility:     Wheelchair Mobility:     Transfers:  Sit to Stand: Modified independent  Stand to Sit: Modified independent  Gait:     Base of Support: Shift to right  Speed/Rosalie: Slow  Step Length: Right shortened;Left shortened  Gait Abnormalities: Decreased step clearance;Trunk sway increased  Distance (ft): 300 Feet (ft)  Assistive Device: Walker, rolling  Ambulation - Level of Assistance: Modified independent  Interventions: Safety awareness training; Tactile cues; Verbal cues      Body Structures Involved:  1. Bones  2. Joints  3. Muscles Body Functions Affected:  1. Neuromusculoskeletal  2. Movement Related Activities and Participation Affected:  1. Mobility  2.  Self Care   Number of elements that affect the Plan of Care: 4+: HIGH COMPLEXITY   CLINICAL PRESENTATION:   Presentation: Stable and uncomplicated: LOW COMPLEXITY   CLINICAL DECISION MAKIN[address] [place] [address] AM-PAC 6 Clicks   Basic Mobility Inpatient Short Form  How much difficulty does the patient currently have. .. Unable A Lot A Little None   1. Turning over in bed (including adjusting bedclothes, sheets and blankets)? [] 1   [] 2   [] 3   [x] 4   2. Sitting down on and standing up from a chair with arms ( e.g., wheelchair, bedside commode, etc.)   [] 1   [] 2   [x] 3   [] 4   3. Moving from lying on back to sitting on the side of the bed? [] 1   [] 2   [x] 3   [] 4   How much help from another person does the patient currently need. .. Total A Lot A Little None   4. Moving to and from a bed to a chair (including a wheelchair)? [] 1   [] 2   [x] 3   [] 4   5. Need to walk in hospital room? [] 1   [] 2   [x] 3   [] 4   6. Climbing 3-5 steps with a railing? [] 1   [x] 2   [] 3   [] 4   © 2007, Trustees of [address] [place] [address] [address] [id number], under license to [employer]. All rights reserved      Score:  Initial: 18 Most Recent: 18 (Date: 18 )    Interpretation of Tool:  Represents activities that are increasingly more difficult (i.e. Bed mobility, Transfers, Gait). Score 24 23 22-20 19-15 14-10 9-7 6     Modifier CH CI CJ CK CL CM CN      ? Mobility - Walking and Moving Around:     - CURRENT STATUS: CK - 40%-59% impaired, limited or restricted    - GOAL STATUS: CJ - 20%-39% impaired, limited or restricted    - D/C STATUS:  ---------------To be determined---------------  Payor: BLUE CR[place] / [place] [address] / Product Type: PPO /      Medical Necessity:     · Patient demonstrates good rehab potential due to higher previous functional level. Reason for Services/Other Comments:  · Patient continues to require skilled intervention due to decreased functional mobility and balance/gait status from baseline. .   Use of outcome tool(s) and clinical judgement create a POC that gives a: Clear prediction of patient's progress: LOW COMPLEXITY TREATMENT:   (In addition to Assessment/Re-Assessment sessions the following treatments were rendered)   Pre-treatment Symptoms/Complaints:    Pain: Initial:   Pain Intensity 1: 0  Post Session:  0/10     Therapeutic Activity: (    15 Minutes): Therapeutic activities including transfer training, ambulation on level ground, static/dynamic standing balance activities, instruction in sequencing with rolling walker, scooting, and patient education to improve mobility, strength and balance. Required minimal Safety awareness training; Tactile cues; Verbal cues to promote static and dynamic balance in standing and promote coordination of bilateral, lower extremity(s). Group Therapeutic Exercise: ( 10  Minutes):  Exercises per grid below to improve mobility, strength, balance and stiffness/soreness. Required moderate visual, verbal, manual and tactile cues to promote proper body alignment, promote proper body posture and promote proper body mechanics. Progressed range, repetitions and complexity of movement as indicated. Date:  2/26/18 Date:  3/1/18 Date:  3/2/18   ACTIVITY/EXERCISE AM PM AM PM AM PM   Ambulation:           Distance  Device  Duration         Seated Heel Raises x15B A x15B A x15B A  x20B A    Seated Toe Raises x15B A x15B A x15B A  x20B A    Seated Long Arc Quads x15B A x15B A x15B A  x20B A    Seated Marching x15B A x15B A x15B A  x20B A    Seated Hip Abduction x15B A x15B A x15B A  x20B A    Seated heel slides x10L A x10L A x10L A  x10L A    Seated quad sets x10L A x10L A   x10L A    B = bilateral; AA = active assistive; A = active; P = passive        Braces/Orthotics/Lines/Etc:   · None  Treatment/Session Assessment:    · Response to Treatment:  See above.   · Interdisciplinary Collaboration:   o Physical Therapy Assistant  o Registered Nurse  · After treatment position/precautions:   o Up in chair  o Bed alarm/tab alert on  o Bed/Chair-wheels locked  o Call light within reach  o RN notified  o All needs within reach   · Compliance with Program/Exercises: Will assess as treatment progresses. · Recommendations/Intent for next treatment session: \"Next visit will focus on advancements to more challenging activities and reduction in assistance provided\".   Total Treatment Duration:  PT Patient Time In/Time Out  Time In: 1100 (1140)  Time Out: 1115 (1150)    Damian Mercer, PTA

## 2018-03-02 NOTE — PROGRESS NOTES
Pharmacokinetic Consult to Pharmacist    Gudelia Lucky is a 57 y.o. male being treated for MRSA bacteremia, pyomyositis, and septic arthritis with vancomycin. Height: 5' 8\" (172.7 cm)  Weight: 113.4 kg (250 lb)  Lab Results   Component Value Date/Time    BUN 13 03/02/2018 06:43 AM    Creatinine 0.94 03/02/2018 06:43 AM    WBC 9.3 03/02/2018 06:43 AM      Estimated Creatinine Clearance: 97 mL/min (based on Cr of 0.94). Lab Results   Component Value Date/Time    Vancomycin,trough 15.9 03/02/2018 12:06 PM       EOT per ID is 3/20/18. Day 18 of vancomycin. Goal trough is 15-20. Trough resulted at goal at 15.9, so will continue current dose of 1500 mg Q12H. Next trough in 2 to 3 days unless patient discharged before then. Appears to be just waiting to be accepted to facility at this time. Will continue to follow patient.       Thank you,  Delonte Christianson, PharmD  Clinical Pharmacist  [phone]

## 2018-03-02 NOTE — PROGRESS NOTES
ORTH FRACTURE PROGRESS NOTE    2018  Admit Date:   2018    Post Op day: 9 Days Post-Op    Subjective:    Bety Cano IN BED     PT/OT:   Gait:  Gait  Base of Support: Shift to right  Speed/Rosalie: Slow  Step Length: Right shortened, Left shortened  Swing Pattern: Right asymmetrical  Stance: Left decreased, Right increased  Gait Abnormalities: Decreased step clearance, Trunk sway increased  Ambulation - Level of Assistance: Modified independent  Distance (ft): 300 Feet (ft)  Assistive Device: Walker, rolling  Interventions: Safety awareness training, Tactile cues, Verbal cues            Interventions: Safety awareness training, Tactile cues, Verbal cues    Vital Signs:    Patient Vitals for the past 8 hrs:   BP Temp Pulse Resp SpO2   18 0732 136/86 99.1 °F (37.3 °C) 80 16 100 %     Temp (24hrs), Av.2 °F (37.3 °C), Min:98.6 °F (37 °C), Max:100.1 °F (37.8 °C)      Pain Control:   Pain Assessment  Pain Scale 1: Visual  Pain Intensity 1: 0  Pain Onset 1: post op  Pain Location 1: Leg  Pain Orientation 1: Left  Pain Description 1: Aching  Pain Intervention(s) 1: Medication (see MAR)    Meds:    Current Facility-Administered Medications   Medication Dose Route Frequency    vancomycin (VANCOCIN) 1500 mg in  ml infusion  1,500 mg IntraVENous Q12H    0.9% sodium chloride infusion 250 mL  250 mL IntraVENous PRN    hydrALAZINE (APRESOLINE) 20 mg/mL injection 10 mg  10 mg IntraVENous Q6H PRN    docusate sodium (COLACE) capsule 100 mg  100 mg Oral BID    lip protectant (BLISTEX) ointment   Topical PRN    sodium chloride (NS) flush 10 mL  10 mL InterCATHeter Q8H    heparin (porcine) pf 300 Units  300 Units InterCATHeter Q8H    sodium chloride (NS) flush 10 mL  10 mL InterCATHeter PRN    heparin (porcine) pf 300 Units  300 Units InterCATHeter PRN    acetaminophen (TYLENOL) tablet 650 mg  650 mg Oral Q6H PRN    naloxone (NARCAN) injection 0.4 mg  0.4 mg IntraVENous EVERY 2 MINUTES AS NEEDED    oxyCODONE-acetaminophen (PERCOCET) 5-325 mg per tablet 1 Tab  1 Tab Oral Q4H PRN    oxyCODONE-acetaminophen (PERCOCET) 5-325 mg per tablet 2 Tab  2 Tab Oral Q4H PRN    traMADol (ULTRAM) tablet 50 mg  50 mg Oral Q6H PRN       LAB:    Recent Labs      03/02/18   0643   HCT  27.7*   HGB  8.5*       24 Hour Assessment Issues:    Oriented    Discharge Planning: TBD    Transfuse PRBC's:      Assessment & Physician's Comment:  Dressing is clean, dry, and intact  Neurovascular checks within normal limits   I CAN RANGE HIS LEFT KNEE 0-60 DEGREE WITH NO PAIN  SOME SWELLING  NO ERYTHEMA  SCANT SEROUS DRAINAGE    Principal Problem:    Septic arthritis of knee, left (HCC) (2/21/2018)    Active Problems:    Leukocytosis (2/13/2018)      Hypoalbuminemia (2/13/2018)      Anemia (2/13/2018)      Left leg cellulitis (2/13/2018)      Abnormal transaminases (2/13/2018)      MRSA bacteremia (2/21/2018)        Plan:  CONTINUE THERAPY FOR ACTIVE/PASSIVE ROM LEFT KNEE, GAIT TRAINING - WBAT   REPEAT LABS - ESR, CRP, AND CBC   REPEAT MRI TO ASSESS FOR ANY DRAINAGE COLLECTION IN THE DISTAL THIGH      Karen Mayo MD

## 2018-03-02 NOTE — PROGRESS NOTES
Infectious Disease Progress Note    Today's Date: 3/2/2018   Admit Date: 2018    Impression:   · MRSA bacteremia ( - ); repeat cultures  NG; TTE  negative for vegetation, MANJINDER NG  · MRSA pyomyositis left leg  · MRSA left knee septic arthritis, aspirated 18, s/p I&D   · Recent lumbar spine surgery with hardware, 10/2017 (in [place])  · Microcytic anemia with elevated ferritin - likely chronic disease anemia or inflammatory anemia s/p GI eval  · Thrombocytosis    Plan:   Plan is likely for [facility] today. Patient resides in [place] and is here visiting family. No change to ID Plan:   ID Recommendations for plan of care - ID cannot follow the patient at SNF. Routine PICC care  Continue vancomycin through 3/20/18, current dose 1500 Q12 hrs, adjust dose to keep vancomycin trough 15-20. Q Monday labs:   CBC with diff, serum creatinine, vancomycin trough, ESR, non-cardiac inflammatory CRP  Q Thursday labs:   Serum creatinine and vancomycin trough  Follow up with ID 3/20/18 at 0840    Anti-infectives:   IV vancomycin    Subjective:     Complains of mild chills when he drinks something cold. Denies nausea, vomiting, diarrhea, or sweats. Concerned about elevated temperature, Tmax 100.1. Denies difficulty breathing. No Known Allergies     Review of Systems:  Pertinent items are noted in the History of Present Illness.     Objective:     Visit Vitals    /86 (BP 1 Location: Left arm, BP Patient Position: At rest)    Pulse 80    Temp 99.1 °F (37.3 °C)    Resp 16    Ht 5' 8\" (1.727 m)    Wt 113.4 kg (250 lb)    SpO2 100%    BMI 38.01 kg/m2     Temp (24hrs), Av.1 °F (37.3 °C), Min:98.6 °F (37 °C), Max:100.1 °F (37.8 °C)       Lines:   RUE PICC intact        Physical Exam:    General:  Alert, cooperative, obese   Eyes:  Sclera anicteric   Mouth/Throat: Dentition in good repair, moist mucosa, no erythema or exudates   Neck: Supple   Lungs:   Clear lung fields in anterior position, no increased work of breathing   CV:  Regular rate and rhythm without audible murmur   Abdomen:   Soft, non tender, obese, active bowel sounds   Extremities: RLE 1+ edema   Skin: No rashes/hives   Musculoskeletal: L lateral thigh/knee incision, healing, staples removed this morning, no erythema, no active drainage, significant swelling at the knee, very tender   Lines/Devices:  Intact, no erythema, drainage or tenderness   Psych: Alert, oriented x3     Data Review:     CBC:  Recent Labs      18   0643  18   0517  18   0524   WBC  9.3  10.2  11.1   GRANS  73  70   --    MONOS  8  9   --    EOS  4  4   --    ANEU  6.8  7.2   --    ABL  1.3  1.6   --    HGB  8.5*  8.1*  8.4*   HCT  27.7*  26.0*  26.6*   PLT  538*  511*  532*       BMP:  Recent Labs      18   0643  1817  18   1053   CREA  0.94  0.98  1.03   BUN  13  11   --    NA  141  140   --    K  3.8  3.8   --    CL  105  105   --    CO2  27  26   --    AGAP  9  9   --    GLU  99  102*   --        LFTS:  No results for input(s): TBILI, ALT, SGOT, AP, TP, ALB in the last 72 hours. Microbiology:     All Micro Results     None          Imagin/15/18 L knee film: IMPRESSION:  1. Large joint effusion and soft tissue edema without acute osseous abnormality. An infectious/inflammatory process of the left knee joint cannot be excluded  given the appearance. Recommend clinical correlation. 18 MRI left leg  IMPRESSION:  1. Multiloculated fluid collections within the left lower extremity most compatible with abscesses. A large joint effusion is present with enhancement as well as enhancement within the joint space therefore concerning for septic arthritis. There are no definite findings of osteomyelitis. 2. Large right joint effusion.     Signed By: Daylin Fink NP     2018

## 2018-03-02 NOTE — PROGRESS NOTES
Problem: Falls - Risk of  Goal: *Absence of Falls  Document Kinjal Fall Risk and appropriate interventions in the flowsheet.    Outcome: Progressing Towards Goal  Fall Risk Interventions:  Mobility Interventions: Bed/chair exit alarm, Communicate number of staff needed for ambulation/transfer    Mentation Interventions: Adequate sleep, hydration, pain control, Bed/chair exit alarm, Door open when patient unattended, Evaluate medications/consider consulting pharmacy, Eyeglasses and hearing aids, Familiar objects from home, Family/sitter at bedside, Gait belt with transfers/ambulation, Increase mobility, More frequent rounding, Reorient patient, Room close to nurse's station, Self-releasing belt, Toileting rounds, Update white board    Medication Interventions: Assess postural VS orthostatic hypotension, Teach patient to arise slowly    Elimination Interventions: Call light in reach, Toileting schedule/hourly rounds

## 2018-03-02 NOTE — PROGRESS NOTES
Hospitalist Progress Note    3/2/2018  Admit Date: 2018  9:12 AM   NAME: Breanna Sherman   :  1955   DOS:              18  MRN:  [id number]   Attending: Wesly Ramon MD  PCP:  None  Treatment Team: Attending Provider: Beryl Ventura. Jake Santana MD; Physician: Tori Phelps MD; Consulting Provider: Khushi Hatch MD; Consulting Provider: Carlos Jett MD; Care Manager: Adriana Pineda, RN; Consulting Provider: Leona Ledesma MD; Care Manager: Emelia Caldera RN; Primary Nurse: Katie Draper; Charge Nurse: Thao Agosto RN    Full Code     SUBJECTIVE:   As previously documented: \" Mr. Brayden Marquez a 57 y. o. male who has no significant PMHx who was admitted on 18 for left leg non purulent cellulitis. Of note, he lives in [place] and came to visit family members to [place], [place] on 18. He denied falls, prior knee or leg surgery. Duplex was was unremarkable for DVT. He was started on vancomycin. Has persistent Blood cultures positive for MRSA bacteremia. ECHO negative for vegetations. Due to poor improvement of his thigh and leg edema and pain he underwent an MRI of his leg showing multiloculated abscesses and findings of septic knee. He underwent I&D and knee lavage on 18 by general surgery. Orthopedics performed knee aspiration. So far, all BC and wound aspirate and cultures are positive for staph aureus. ID consulted. Plan for outpatient antibiotic completion at SNF/Rehab and wound care once Blood cultures are negative and he gets a PICC line. Patient still has persistent leukocytosis. He is being transferred to [facility] for further surgical intervention by Orthopedic group- Dr. Heidi Bishop".           18   Mr. Breanna Sherman  Is a 56 yo male with multiloculated abscesses and septic knee s/p lavage on vancomycin. He was ambulating well this AM and reports that his knee pain is well controlled. He denies any dyspnea.             10+ ROS reviewed and negative except for positive in HPI.    No Known Allergies  Current Facility-Administered Medications   Medication Dose Route Frequency    vancomycin (VANCOCIN) 1500 mg in  ml infusion  1,500 mg IntraVENous Q12H    0.9% sodium chloride infusion 250 mL  250 mL IntraVENous PRN    hydrALAZINE (APRESOLINE) 20 mg/mL injection 10 mg  10 mg IntraVENous Q6H PRN    docusate sodium (COLACE) capsule 100 mg  100 mg Oral BID    lip protectant (BLISTEX) ointment   Topical PRN    sodium chloride (NS) flush 10 mL  10 mL InterCATHeter Q8H    heparin (porcine) pf 300 Units  300 Units InterCATHeter Q8H    sodium chloride (NS) flush 10 mL  10 mL InterCATHeter PRN    heparin (porcine) pf 300 Units  300 Units InterCATHeter PRN    acetaminophen (TYLENOL) tablet 650 mg  650 mg Oral Q6H PRN    naloxone (NARCAN) injection 0.4 mg  0.4 mg IntraVENous EVERY 2 MINUTES AS NEEDED    oxyCODONE-acetaminophen (PERCOCET) 5-325 mg per tablet 1 Tab  1 Tab Oral Q4H PRN    oxyCODONE-acetaminophen (PERCOCET) 5-325 mg per tablet 2 Tab  2 Tab Oral Q4H PRN    traMADol (ULTRAM) tablet 50 mg  50 mg Oral Q6H PRN         Immunization History   Administered Date(s) Administered    TB Skin Test (PPD) Intradermal 2018     Objective:     Patient Vitals for the past 24 hrs:   Temp Pulse Resp BP SpO2   18 0732 99.1 °F (37.3 °C) 80 16 136/86 100 %   18 0428 98.6 °F (37 °C) 100 16 143/81 97 %   18 2337 99 °F (37.2 °C) 94 16 161/78 98 %   18 99.3 °F (37.4 °C) 95 16 162/80 95 %   18 1541 100.1 °F (37.8 °C) (!) 107 17 159/77 95 %     Temp (24hrs), Av.2 °F (37.3 °C), Min:98.6 °F (37 °C), Max:100.1 °F (37.8 °C)    Oxygen Therapy  O2 Sat (%): 100 % (18 0732)  Pulse via Oximetry: 111 beats per minute (18 1700)  O2 Device: Room air (18 0711)  O2 Flow Rate (L/min): 3 l/min (18 1557)  Oxygen Therapy  O2 Sat (%): 100 % (18 0732)  Pulse via Oximetry: 111 beats per minute (18 1700)  O2 Device: Room air (02/24/18 0711)  O2 Flow Rate (L/min): 3 l/min (02/21/18 2377)    Physical Exam:  General:         Alert, cooperative, no acute  Distress. Low grade fevers   HEENT:               NCAT. No obvious deformity. Nares normal. No drainage  Lungs:         CTABL. No wheezing/rhonchi/rales  Cardiovascular:   RRR. No m/r/g. 2+ pitting pedal edema LLE. Trace edema RLE. . +2 PT/DT pulses b/l. Abdomen:       S/nt/nd. Bowel sounds normal. .   Skin:         No rashes or lesions. Not Jaundiced  Musculoskeletal: L knee with dressing, no discharge. No obvious bleeding. Enlarge left thigh on distal area. Neurologic:    AAOx3. Silvia Hieu No gross focal deficit. Moves all extremities. Psychiatric:         Good mood. Normal affect. DIAGNOSTIC STUDIES      Data Review:   Recent Results (from the past 24 hour(s))   CBC WITH AUTOMATED DIFF    Collection Time: 03/02/18  6:43 AM   Result Value Ref Range    WBC 9.3 4.3 - 11.1 K/uL    RBC 3.29 (L) 4.23 - 5.67 M/uL    HGB 8.5 (L) 13.6 - 17.2 g/dL    HCT 27.7 (L) 41.1 - 50.3 %    MCV 84.2 79.6 - 97.8 FL    MCH 25.8 (L) 26.1 - 32.9 PG    MCHC 30.7 (L) 31.4 - 35.0 g/dL    RDW 17.9 (H) 11.9 - 14.6 %    PLATELET 139 (H) 586 - 450 K/uL    MPV 9.4 (L) 10.8 - 14.1 FL    DF AUTOMATED      NEUTROPHILS 73 43 - 78 %    LYMPHOCYTES 14 13 - 44 %    MONOCYTES 8 4.0 - 12.0 %    EOSINOPHILS 4 0.5 - 7.8 %    BASOPHILS 1 0.0 - 2.0 %    IMMATURE GRANULOCYTES 0 0.0 - 5.0 %    ABS. NEUTROPHILS 6.8 1.7 - 8.2 K/UL    ABS. LYMPHOCYTES 1.3 0.5 - 4.6 K/UL    ABS. MONOCYTES 0.7 0.1 - 1.3 K/UL    ABS. EOSINOPHILS 0.3 0.0 - 0.8 K/UL    ABS. BASOPHILS 0.1 0.0 - 0.2 K/UL    ABS. IMM.  GRANS. 0.0 0.0 - 0.5 K/UL   METABOLIC PANEL, BASIC    Collection Time: 03/02/18  6:43 AM   Result Value Ref Range    Sodium 141 136 - 145 mmol/L    Potassium 3.8 3.5 - 5.1 mmol/L    Chloride 105 98 - 107 mmol/L    CO2 27 21 - 32 mmol/L    Anion gap 9 7 - 16 mmol/L    Glucose 99 65 - 100 mg/dL    BUN 13 8 - 23 MG/DL    Creatinine 0. 94 0.8 - 1.5 MG/DL    GFR est AA >60 >60 ml/min/1.73m2    GFR est non-AA >60 >60 ml/min/1.73m2    Calcium 10.1 8.3 - 10.4 MG/DL   VANCOMYCIN, TROUGH    Collection Time: 03/02/18 12:06 PM   Result Value Ref Range    Vancomycin,trough 15.9 5 - 20 ug/mL       All Micro Results     None          Imaging /Procedures /Studies:    MRI LLE 2/16/18 FINDINGS:   There are multiple fluid collections within the deep muscles of the  left leg particularly along the anterior and lateral thigh. One of these  components measures approximately 3.0 x 6.5 x 4.0 cm. There are smaller  components below the knee medially extending to the upper to mid calf. There is  a large joint effusion with peripheral enhancement. There is enhancement about  the joint space. There is marrow edema associated with the medial compartment  which is relatively symmetric to the contralateral side, more likely to be  degenerative rather than osteomyelitis. There is extensive muscular edema. There  is a large joint effusion on the right as well although postcontrast imaging of  this knee was not performed. IMPRESSION:  1. Multiloculated fluid collections within the left lower extremity most  compatible with abscesses. A large joint effusion is present with enhancement as  well as enhancement within the joint space therefore concerning for septic  arthritis. There are no definite findings of osteomyelitis. 2. Large right joint effusion. Echocardiogram 2/14/18: SUMMARY:  -  Left ventricle: Systolic function was normal. Ejection fraction was  estimated in the range of 55 % to 60 %. There were no regional wall motion  abnormalities. There was mild concentric hypertrophy. The E/e' ratio was 8.3.  -  Pericardium: There was no pericardial effusion.     Labs and Studies from previous 24 hours have been personally reviewed by myself [address] [employer] Problems as of 3/2/2018  Date Reviewed: 2/20/2018          Codes Class Noted - Resolved POA    * (Principal)Septic arthritis of knee, left ([address] ICD-10-CM: M00.9  ICD-9-CM: 711.06  2/21/2018 - Present Yes        MRSA bacteremia ICD-10-CM: R78.81  ICD-9-CM: 790.7, 041.12  2/21/2018 - Present Yes        Leukocytosis ICD-10-CM: D72.829  ICD-9-CM: 288.60  2/13/2018 - Present Yes        Hypoalbuminemia ICD-10-CM: E88.09  ICD-9-CM: 273.8  2/13/2018 - Present Yes        Anemia ICD-10-CM: D64.9  ICD-9-CM: 285.9  2/13/2018 - Present Yes        Left leg cellulitis ICD-10-CM: L03.116  ICD-9-CM: 682.6  2/13/2018 - Present Yes        Abnormal transaminases ICD-10-CM: R74.8  ICD-9-CM: 790.4  2/13/2018 - Present Yes              Plan:  L knee septic arthritis with MRSA bacteremia and leg cellulitis:  - aspirated on 2/16/18   - On IV vancomycin with EOT:3/19/18  - BC 2/2 from 2/19 negative   -TTE and MANJINDER  w/o vegetations. S/p  debridement on 2/21/2018 of left knee and thigh with delayed primary closure  Plan  Continue Vancomycin   +drainage from knee wound, ID to order MRI to evaluate    Acute blood loss anemia:  - on Iron  supplementation  -  Hemoccult collection pending  - s/p 3 units PRBC inpatient  Plan  Evaluated by GI: microcytic anemia most likely secondary to anemia of chronic disease  No evidence of GI bleed    Thrombocytosis  Chronic thrombocytosis during duration of hospitalization  Plan  Hematology consult : Most likely reactive thrombocytosis      Elevated liver enzymes:  - resolved - unclear etiology  - acute hepatitis panel negative     Debility :  -PT/OT/PPD/IRC  - will need rehab - Pending placement      DVT Prophylaxis:SCD  CODE Status: Full CODE  Plan of Care Discussed with: patient.  Care team.  Medical Risk: high  Disposition: pending rehab     Anthony Horton MD  03/02/18

## 2018-03-03 ENCOUNTER — APPOINTMENT (OUTPATIENT)
Dept: MRI IMAGING | Age: 63
DRG: 853 | End: 2018-03-03
Attending: NURSE PRACTITIONER
Payer: COMMERCIAL

## 2018-03-03 LAB
ALBUMIN SERPL-MCNC: 0.9 G/DL (ref 3.2–4.6)
ALBUMIN/GLOB SERPL: 0.1 {RATIO} (ref 1.2–3.5)
ALP SERPL-CCNC: 112 U/L (ref 50–136)
ALT SERPL-CCNC: 38 U/L (ref 12–65)
ANION GAP SERPL CALC-SCNC: 10 MMOL/L (ref 7–16)
AST SERPL-CCNC: 24 U/L (ref 15–37)
BASOPHILS # BLD: 0 K/UL (ref 0–0.2)
BASOPHILS NFR BLD: 0 % (ref 0–2)
BILIRUB SERPL-MCNC: 0.4 MG/DL (ref 0.2–1.1)
BUN SERPL-MCNC: 11 MG/DL (ref 8–23)
CALCIUM SERPL-MCNC: 9.3 MG/DL (ref 8.3–10.4)
CHLORIDE SERPL-SCNC: 105 MMOL/L (ref 98–107)
CO2 SERPL-SCNC: 26 MMOL/L (ref 21–32)
CREAT SERPL-MCNC: 1 MG/DL (ref 0.8–1.5)
DIFFERENTIAL METHOD BLD: ABNORMAL
EOSINOPHIL # BLD: 0.3 K/UL (ref 0–0.8)
EOSINOPHIL NFR BLD: 4 % (ref 0.5–7.8)
ERYTHROCYTE [DISTWIDTH] IN BLOOD BY AUTOMATED COUNT: 17.7 % (ref 11.9–14.6)
GLOBULIN SER CALC-MCNC: 7.2 G/DL (ref 2.3–3.5)
GLUCOSE SERPL-MCNC: 137 MG/DL (ref 65–100)
HCT VFR BLD AUTO: 25.8 % (ref 41.1–50.3)
HGB BLD-MCNC: 8.1 G/DL (ref 13.6–17.2)
IMM GRANULOCYTES # BLD: 0 K/UL (ref 0–0.5)
IMM GRANULOCYTES NFR BLD AUTO: 0 % (ref 0–5)
LYMPHOCYTES # BLD: 1 K/UL (ref 0.5–4.6)
LYMPHOCYTES NFR BLD: 12 % (ref 13–44)
MCH RBC QN AUTO: 26.6 PG (ref 26.1–32.9)
MCHC RBC AUTO-ENTMCNC: 31.4 G/DL (ref 31.4–35)
MCV RBC AUTO: 84.6 FL (ref 79.6–97.8)
MONOCYTES # BLD: 0.6 K/UL (ref 0.1–1.3)
MONOCYTES NFR BLD: 7 % (ref 4–12)
NEUTS SEG # BLD: 6.4 K/UL (ref 1.7–8.2)
NEUTS SEG NFR BLD: 77 % (ref 43–78)
PLATELET # BLD AUTO: 499 K/UL (ref 150–450)
PMV BLD AUTO: 9.4 FL (ref 10.8–14.1)
POTASSIUM SERPL-SCNC: 3.5 MMOL/L (ref 3.5–5.1)
PROT SERPL-MCNC: 8.1 G/DL (ref 6.3–8.2)
RBC # BLD AUTO: 3.05 M/UL (ref 4.23–5.67)
SODIUM SERPL-SCNC: 141 MMOL/L (ref 136–145)
WBC # BLD AUTO: 8.4 K/UL (ref 4.3–11.1)

## 2018-03-03 PROCEDURE — 36592 COLLECT BLOOD FROM PICC: CPT

## 2018-03-03 PROCEDURE — 80053 COMPREHEN METABOLIC PANEL: CPT | Performed by: INTERNAL MEDICINE

## 2018-03-03 PROCEDURE — 99232 SBSQ HOSP IP/OBS MODERATE 35: CPT | Performed by: INTERNAL MEDICINE

## 2018-03-03 PROCEDURE — 73721 MRI JNT OF LWR EXTRE W/O DYE: CPT

## 2018-03-03 PROCEDURE — 74011250636 HC RX REV CODE- 250/636: Performed by: NURSE PRACTITIONER

## 2018-03-03 PROCEDURE — 74011250636 HC RX REV CODE- 250/636: Performed by: INTERNAL MEDICINE

## 2018-03-03 PROCEDURE — 85025 COMPLETE CBC W/AUTO DIFF WBC: CPT | Performed by: INTERNAL MEDICINE

## 2018-03-03 PROCEDURE — 97110 THERAPEUTIC EXERCISES: CPT

## 2018-03-03 PROCEDURE — 74011250637 HC RX REV CODE- 250/637: Performed by: FAMILY MEDICINE

## 2018-03-03 PROCEDURE — 65270000029 HC RM PRIVATE

## 2018-03-03 RX ADMIN — DOCUSATE SODIUM 100 MG: 100 CAPSULE, LIQUID FILLED ORAL at 17:27

## 2018-03-03 RX ADMIN — Medication 10 ML: at 21:01

## 2018-03-03 RX ADMIN — Medication 300 UNITS: at 14:04

## 2018-03-03 RX ADMIN — Medication 10 ML: at 06:15

## 2018-03-03 RX ADMIN — OXYCODONE HYDROCHLORIDE AND ACETAMINOPHEN 1 TABLET: 5; 325 TABLET ORAL at 10:58

## 2018-03-03 RX ADMIN — Medication 10 ML: at 14:04

## 2018-03-03 RX ADMIN — Medication 300 UNITS: at 21:00

## 2018-03-03 RX ADMIN — OXYCODONE HYDROCHLORIDE AND ACETAMINOPHEN 2 TABLET: 5; 325 TABLET ORAL at 21:00

## 2018-03-03 RX ADMIN — VANCOMYCIN HYDROCHLORIDE 1500 MG: 10 INJECTION, POWDER, LYOPHILIZED, FOR SOLUTION INTRAVENOUS at 11:00

## 2018-03-03 RX ADMIN — VANCOMYCIN HYDROCHLORIDE 1500 MG: 10 INJECTION, POWDER, LYOPHILIZED, FOR SOLUTION INTRAVENOUS at 23:38

## 2018-03-03 RX ADMIN — DOCUSATE SODIUM 100 MG: 100 CAPSULE, LIQUID FILLED ORAL at 08:24

## 2018-03-03 RX ADMIN — Medication 300 UNITS: at 06:00

## 2018-03-03 NOTE — PROGRESS NOTES
Problem: Mobility Impaired (Adult and Pediatric)  Goal: *Acute Goals and Plan of Care (Insert Text)  LTG:  (1.)Mr. Lloyd Littlejohn will perform bed mobility with INDEPENDENCE within 7 treatment day(s). (2.)Mr. Lloyd Littlejohn will transfer from bed to chair and chair to bed with MODIFIED INDEPENDENCE using the least restrictive device within 7 treatment day(s). (3.)Mr. Lloyd Littlejohn will ambulate with MODIFIED INDEPENDENCE for 150+ feet with the least restrictive device within 7 treatment day(s). (4.)Mr. Lloyd Littlejohn will demonstrate GOOD DYNAMIC standing balance utilizing least restrictive assistive device within 7 treatment day(s). (5.)Mr. Lloyd Littlejohn will tolerate 25+ minutes of therapeutic activity/exercise while maintaining stable vitals to improve functional strength and endurance within 7 day(s). _______________________________________________________________________________________________  PHYSICAL THERAPY: Daily Note, Treatment Day: 5th, PM 3/3/2018  INPATIENT: Hospital Day: 11  Payor[place] BLUE CR[place] / [place] [facility] / Product Type: PPO /      Claudia Martyr in place at all times. NAME/AGE/GENDER: Sagar Hutson is a 57 y.o. male   PRIMARY DIAGNOSIS: Open wound of left knee, initial encounter [S81.002A] Septic arthritis of knee, left (HCC) Septic arthritis of knee, left (HCC)  Procedure(s) (LRB):  IRRIGATION AND DEBRIDEMENT LEFT KNEE/ BEING ADMITTED FROM [place] [address] ON 2.20 (Left)  10 Days Post-Op  ICD-10: Treatment Diagnosis:   · Generalized Muscle Weakness (M62.81)  · Difficulty in walking, Not elsewhere classified (R26.2)   Precaution/Allergies:  Review of patient's allergies indicates no known allergies. ASSESSMENT:     Mr. Lloyd Littlejohn is a 57year old male admitted with left knee infection  s/p L knee irigation and debridement. Patient was sitting up in recliner  chair upon contact and agreeable to PT. Patient has been off the floor at CT earlier.   RN okays treatment however states to be easy on the patient pending CT results. Significant decreased in gait distance this treatment session (from chair to sink). Treatment consisted mostly of therapeutic exercises to increase ROM and strength. Tolerated well. Mobility is slow and patient can get very distracted due to his talkative nature. No real progress demonstrated due to restriction noted. Patient continues to be functioning well below his baseline as he does not usually ambulate with an assistive device. Would benefit from continued skilled PT post acute care stay. Overall slow, steady progress towards physical therapy goals. No goals have been met thus far. Will continue efforts. This section established at most recent assessment   PROBLEM LIST (Impairments causing functional limitations):  1. Decreased Strength  2. Decreased ADL/Functional Activities  3. Decreased Transfer Abilities  4. Decreased Ambulation Ability/Technique  5. Decreased Balance  6. Increased Pain  7. Decreased Activity Tolerance  8. Decreased Flexibility/Joint Mobility  9. Decreased Knowledge of Precautions  10. Decreased [place] with Home Exercise Program   INTERVENTIONS PLANNED: (Benefits and precautions of physical therapy have been discussed with the patient.)  1. Balance Exercise  2. Bed Mobility  3. Family Education  4. Gait Training  5. Range of Motion (ROM)  6. Therapeutic Activites  7. Transfer Training  8. Group Therapy     TREATMENT PLAN: Frequency/Duration: 3 times a week for duration of hospital stay  Rehabilitation Potential For Stated Goals: GOOD     RECOMMENDED REHABILITATION/EQUIPMENT: (at time of discharge pending progress): Due to the probability of continued deficits (see above) this patient will likely need continued skilled physical therapy after discharge.   Equipment:    Walkers, Type: Rolling Walker              HISTORY:   History of Present Injury/Illness (Reason for Referral):  S/p left knee I&D  Past Medical History/Comorbidities:   Mr. Eliot García  has no past medical history on file. Mr. Cavazos  has a past surgical history that includes hx orthopaedic. Social History/Living Environment:   Home Environment: Private residence  Wheelchair Ramp: Yes  One/Two Story Residence: One story  Living Alone: Yes  Support Systems: Family member(s)  Patient Expects to be Discharged to[de-identified] Private residence  Current DME Used/Available at Home: Cane, straight  Prior Level of Function/Work/Activity:   Patient lives alone in a single story residence with 0 steps to enter. At baseline, patient is independent with ADLs, ambulates without utilizing an assistive device, and endorses 0 recent falls. Patient with difficult history to fall regarding falls and L LE pain. Number of Personal Factors/Comorbidities that affect the Plan of Care: 1-2: MODERATE COMPLEXITY   EXAMINATION:   Most Recent Physical Functioning:   Gross Assessment:                  Posture:     Balance:  Sitting: Intact  Standing: Impaired  Standing - Static: Good  Standing - Dynamic : Fair Bed Mobility:     Wheelchair Mobility:     Transfers:  Sit to Stand: Supervision  Stand to Sit: Supervision  Gait:            Body Structures Involved:  1. Bones  2. Joints  3. Muscles Body Functions Affected:  1. Neuromusculoskeletal  2. Movement Related Activities and Participation Affected:  1. Mobility  2. Self Care   Number of elements that affect the Plan of Care: 4+: HIGH COMPLEXITY   CLINICAL PRESENTATION:   Presentation: Stable and uncomplicated: LOW COMPLEXITY   CLINICAL DECISION MAKIN [facility] [address] [id number] AM-PAC 6 Clicks   Basic Mobility Inpatient Short Form  How much difficulty does the patient currently have. .. Unable A Lot A Little None   1. Turning over in bed (including adjusting bedclothes, sheets and blankets)? [] 1   [] 2   [] 3   [x] 4   2. Sitting down on and standing up from a chair with arms ( e.g., wheelchair, bedside commode, etc.)   [] 1   [] 2   [x] 3   [] 4   3.   Moving from lying on back to sitting on the side of the bed? [] 1   [] 2   [x] 3   [] 4   How much help from another person does the patient currently need. .. Total A Lot A Little None   4. Moving to and from a bed to a chair (including a wheelchair)? [] 1   [] 2   [x] 3   [] 4   5. Need to walk in hospital room? [] 1   [] 2   [x] 3   [] 4   6. Climbing 3-5 steps with a railing? [] 1   [x] 2   [] 3   [] 4   © 2007, Trustees of [address] [place] [address] [address] [id number], under license to [employer]. All rights reserved      Score:  Initial: 18 Most Recent: 18 (Date: 2/23/18 )    Interpretation of Tool:  Represents activities that are increasingly more difficult (i.e. Bed mobility, Transfers, Gait). Score 24 23 22-20 19-15 14-10 9-7 6     Modifier CH CI CJ CK CL CM CN      ? Mobility - Walking and Moving Around:     - CURRENT STATUS: CK - 40%-59% impaired, limited or restricted    - GOAL STATUS: CJ - 20%-39% impaired, limited or restricted    - D/C STATUS:  ---------------To be determined---------------  Payor: [place]UE CR[place] / [place] [address] / Product Type: PPO /      Medical Necessity:     · Patient demonstrates good rehab potential due to higher previous functional level. Reason for Services/Other Comments:  · Patient continues to require skilled intervention due to decreased functional mobility and balance/gait status from baseline. .   Use of outcome tool(s) and clinical judgement create a POC that gives a: Clear prediction of patient's progress: LOW COMPLEXITY            TREATMENT:   (In addition to Assessment/Re-Assessment sessions the following treatments were rendered)   Pre-treatment Symptoms/Complaints:  \"come on in\"  Pain: Initial:   Pain Intensity 1: 0  Post Session:  0/10     Therapeutic Activity: (    Minutes):   Therapeutic activities including transfer training, ambulation on level ground, static/dynamic standing balance activities, instruction in sequencing with rolling walker, scooting, and patient education to improve mobility, strength and balance. Required minimal   to promote static and dynamic balance in standing and promote coordination of bilateral, lower extremity(s). Therapeutic Exercise: ( 25  Minutes):  Exercises per grid below to improve mobility, strength, balance and stiffness/soreness. Required moderate visual, verbal, manual and tactile cues to promote proper body alignment, promote proper body posture and promote proper body mechanics. Progressed range, repetitions and complexity of movement as indicated. Date 3/318 Date: Date:   ACTIVITY/EXERCISE AM PM AM PM AM PM   Ambulation:           Distance  Device  Duration         Seated Heel Raises 30        Seated Toe Raises 30        Seated Long Arc Quads 30        Seated Marching 30        Seated Hip Abduction 30        Seated heel slides         Seated quad sets         B = bilateral; AA = active assistive; A = active; P = passive        Braces/Orthotics/Lines/Etc:   · None  Treatment/Session Assessment:    · Response to Treatment:  Tolerated fair to well  · Interdisciplinary Collaboration:   o Physical Therapy Assistant  o Registered Nurse  · After treatment position/precautions:   o Up in chair  o Bed alarm/tab alert on  o Bed/Chair-wheels locked  o Call light within reach  o RN notified  o All needs within reach   · Compliance with Program/Exercises: Will assess as treatment progresses. · Recommendations/Intent for next treatment session: \"Next visit will focus on advancements to more challenging activities and reduction in assistance provided\".   Total Treatment Duration:  PT Patient Time In/Time Out  Time In: 1300  Time Out: 1325    Lindy Cuellar-Franky, PTA

## 2018-03-03 NOTE — PROGRESS NOTES
Problem: Falls - Risk of  Goal: *Absence of Falls  Document Kinjal Fall Risk and appropriate interventions in the flowsheet.    Outcome: Progressing Towards Goal  Fall Risk Interventions:  Mobility Interventions: Communicate number of staff needed for ambulation/transfer, Bed/chair exit alarm    Mentation Interventions: Adequate sleep, hydration, pain control, Bed/chair exit alarm, Door open when patient unattended, Evaluate medications/consider consulting pharmacy, Eyeglasses and hearing aids, Familiar objects from home, Family/sitter at bedside, Gait belt with transfers/ambulation, Increase mobility, More frequent rounding, Reorient patient, Room close to nurse's station, Self-releasing belt, Toileting rounds, Update white board    Medication Interventions: Assess postural VS orthostatic hypotension, Patient to call before getting OOB, Teach patient to arise slowly    Elimination Interventions: Call light in reach, Toileting schedule/hourly rounds

## 2018-03-03 NOTE — PROGRESS NOTES
Hospitalist Progress Note    3/3/2018  Admit Date: 2018  9:12 AM   NAME: Bety Cano   :  1955   MRN:  [id number]   Attending: Richard Singh. Moose Herring MD  PCP:  None    SUBJECTIVE:   Patient presented with leg pain and swelling. Was found to have persistent Blood cultures positive for MRSA bacteremia. ECHO negative for vegetations. Due to poor improvement of his thigh and leg edema and pain he underwent an MRI of his leg showing multiloculated abscesses and findings of septic knee. He underwent I&D and knee lavage on 18 by general surgery. Orthopedics performed knee aspiration. So far, all BC and wound aspirate and cultures are positive for staph aureus. ID consulted and have continued vancomycin through 3/20.      3/3:  Notes drainage from knee. Repeat MRI pending. Afebrile. Pain well controlled      Review of Systems negative with exception of pertinent positives noted above  PHYSICAL EXAM     Visit Vitals    /86 (BP 1 Location: Left arm, BP Patient Position: At rest)    Pulse 96    Temp 99.1 °F (37.3 °C)    Resp 16    Ht 5' 8\" (1.727 m)    Wt 113.4 kg (250 lb)    SpO2 97%    BMI 38.01 kg/m2      Temp (24hrs), Av.2 °F (37.3 °C), Min:98.9 °F (37.2 °C), Max:99.5 °F (37.5 °C)    Oxygen Therapy  O2 Sat (%): 97 % (18 0656)  Pulse via Oximetry: 111 beats per minute (18 1700)  O2 Device: Room air (18 0711)  O2 Flow Rate (L/min): 3 l/min (18 1557)    Intake/Output Summary (Last 24 hours) at 18 0918  Last data filed at 18 0533   Gross per 24 hour   Intake                0 ml   Output             1100 ml   Net            -1100 ml      General: No acute distress    Lungs:  CTA Bilaterally.    Heart:  Regular rate and rhythm,  No murmur, rub, or gallop  Abdomen: Soft, Non distended, Non tender, Positive bowel sounds  Extremities: Left knee wrapped this am  Neurologic:  No focal deficits    ASSESSMENT      Active Hospital Problems    Diagnosis Date Noted    Septic arthritis of knee, left ([facility] 02/21/2018    MRSA bacteremia 02/21/2018    Left leg cellulitis 02/13/2018    Anemia 02/13/2018    Abnormal transaminases 02/13/2018    Leukocytosis 02/13/2018    Hypoalbuminemia 02/13/2018     Plan:  · Continue vancomycin. BC from 2/19 are negative. Plan for vanco through 3/20  · Repeat MRI knee pending today. Follow results  · Will need snf. SW following.      · Appreciate specialist input    DVT Prophylaxis: heparin     Signed By: Karen Moran MD     March 3, 2018

## 2018-03-04 PROCEDURE — 97530 THERAPEUTIC ACTIVITIES: CPT

## 2018-03-04 PROCEDURE — 74011250636 HC RX REV CODE- 250/636: Performed by: NURSE PRACTITIONER

## 2018-03-04 PROCEDURE — 74011000258 HC RX REV CODE- 258: Performed by: NURSE PRACTITIONER

## 2018-03-04 PROCEDURE — 97150 GROUP THERAPEUTIC PROCEDURES: CPT

## 2018-03-04 PROCEDURE — 65270000029 HC RM PRIVATE

## 2018-03-04 PROCEDURE — 74011250637 HC RX REV CODE- 250/637: Performed by: FAMILY MEDICINE

## 2018-03-04 PROCEDURE — 74011250636 HC RX REV CODE- 250/636: Performed by: INTERNAL MEDICINE

## 2018-03-04 RX ADMIN — OXYCODONE HYDROCHLORIDE AND ACETAMINOPHEN 2 TABLET: 5; 325 TABLET ORAL at 14:27

## 2018-03-04 RX ADMIN — Medication 300 UNITS: at 14:38

## 2018-03-04 RX ADMIN — OXYCODONE HYDROCHLORIDE AND ACETAMINOPHEN 2 TABLET: 5; 325 TABLET ORAL at 18:23

## 2018-03-04 RX ADMIN — VANCOMYCIN HYDROCHLORIDE 1500 MG: 10 INJECTION, POWDER, LYOPHILIZED, FOR SOLUTION INTRAVENOUS at 11:43

## 2018-03-04 RX ADMIN — DOCUSATE SODIUM 100 MG: 100 CAPSULE, LIQUID FILLED ORAL at 18:14

## 2018-03-04 RX ADMIN — IRON DEXTRAN 1000 MG: 50 INJECTION INTRAMUSCULAR; INTRAVENOUS at 18:15

## 2018-03-04 RX ADMIN — Medication 10 ML: at 04:30

## 2018-03-04 RX ADMIN — OXYCODONE HYDROCHLORIDE AND ACETAMINOPHEN 2 TABLET: 5; 325 TABLET ORAL at 08:08

## 2018-03-04 RX ADMIN — OXYCODONE HYDROCHLORIDE AND ACETAMINOPHEN 2 TABLET: 5; 325 TABLET ORAL at 22:52

## 2018-03-04 RX ADMIN — Medication 10 ML: at 14:38

## 2018-03-04 RX ADMIN — IRON DEXTRAN 25 MG: 50 INJECTION INTRAMUSCULAR; INTRAVENOUS at 14:29

## 2018-03-04 RX ADMIN — Medication 300 UNITS: at 04:29

## 2018-03-04 RX ADMIN — DOCUSATE SODIUM 100 MG: 100 CAPSULE, LIQUID FILLED ORAL at 08:05

## 2018-03-04 RX ADMIN — OXYCODONE HYDROCHLORIDE AND ACETAMINOPHEN 2 TABLET: 5; 325 TABLET ORAL at 04:27

## 2018-03-04 NOTE — PROGRESS NOTES
Inpatient Hematology / Oncology Progress Note      Admission Date: 2018  9:12 AM  Reason for Admission/Hospital Course: Open wound of left knee, initial encounter [S81.002A]      24 Hour Events:  No new complaints. Feeling well overall. Hgb stable. ROS:  Constitutional: Positive for fatigue, weakness. Negative for fever, chills. CV: Negative for chest pain, palpitations. + edema. Respiratory: Negative for dyspnea, cough, wheezing. GI: Negative for nausea, abdominal pain, diarrhea. 10 point review of systems is otherwise negative with the exception of the elements mentioned above in the HPI. No Known Allergies    OBJECTIVE:  Patient Vitals for the past 8 hrs:   BP Temp Pulse Resp SpO2   18 0717 141/86 97.4 °F (36.3 °C) 94 16 96 %   18 0413 141/80 98.3 °F (36.8 °C) 85 15 98 %     Temp (24hrs), Av.3 °F (36.8 °C), Min:97.4 °F (36.3 °C), Max:98.9 °F (37.2 °C)         Physical Exam:  Constitutional: Well developed, well nourished male in no acute distress, sitting comfortably in the hospital bed. HEENT: Normocephalic and atraumatic. Oropharynx is clear, mucous membranes are moist.  Extraocular muscles are intact. Sclerae anicteric. Skin Warm and dry. No bruising and no rash noted. No erythema. No pallor. Respiratory Lungs are clear to auscultation bilaterally without wheezes, rales or rhonchi, normal air exchange without accessory muscle use. CVS Normal rate, regular rhythm and normal S1 and S2. No murmurs, gallops, or rubs. Abdomen Soft, nontender and nondistended, normoactive bowel sounds. No palpable mass. No hepatosplenomegaly. Neuro Grossly nonfocal with no obvious sensory or motor deficits. MSK Normal range of motion in general.  + edema and no tenderness. Psych Appropriate mood and affect.         Labs:    Recent Labs      18   0631  18   0643   WBC  8.4  9.3   RBC  3.05*  3.29*   HGB  8.1*  8.5*   HCT  25.8*  27.7*   MCV  84.6  84.2 MCH  26.6  25.8*   MCHC  31.4  30.7*   RDW  17.7*  17.9*   PLT  499*  538*   GRANS  77  73   LYMPH  12*  14   MONOS  7  8   EOS  4  4   BASOS  0  1   IG  0  0   DF  AUTOMATED  AUTOMATED   ANEU  6.4  6.8   ABL  1.0  1.3   ABM  0.6  0.7   HEYDI  0.3  0.3   ABB  0.0  0.1   AIG  0.0  0.0      Recent Labs      03/03/18   0631  03/02/18   0643   NA  141  141   K  3.5  3.8   CL  105  105   CO2  26  27   AGAP  10  9   GLU  137*  99   BUN  11  13   CREA  1.00  0.94   GFRAA  >60  >60   GFRNA  >60  >60   CA  9.3  10.1   SGOT  24   --    AP  112   --    TP  8.1   --    ALB  0.9*   --    GLOB  7.2*   --    AGRAT  0.1*   --          Imaging:  Aron Te [[id number]] Collected: 02/26/18 1857      Order Status: Completed Updated: 02/26/18 1900     Narrative:       LIMITED RENAL ULTRASOUND 2/26/2018    HISTORY: Acute bleeding. Evaluate for retroperitoneal blood. TECHNIQUE: Sonographic imaging of the kidneys was performed. FINDINGS: The right kidney is 12.9 cm in length. There is a septated 2.1 cm  upper pole right renal cyst.    The left kidney is 13.4 cm in length. There is a 5.4 cm simple cyst in the upper  pole of the left kidney. There is no hydronephrosis. No free fluid is visible. The bladder is normal in  appearance. The IVC is patent. The distal aorta is not visible on this study.       Impression:       IMPRESSION: No hydronephrosis or acute renal findings.  If there is concern for a  retroperitoneal hematoma, consider a noncontrast CT of the abdomen and pelvis.     DUPLEX LOWER EXT VENOUS LEFT [[id number]] Collected: 02/26/18 1823     Order Status: Completed Updated: 02/26/18 1825     Narrative:       LEFT LOWER EXTREMITY DOPPLER ULTRASOUND 2/26/2018    HISTORY:    Leg swelling, pain, DVT suspected; r/o postop hematoma    Technique: Sonographic imaging of the deep veins of the left leg was performed    FINDINGS:    The common femoral vein, femoral vein, popliteal vein, posterior  tibial veins and peroneal veins compress appropriately. There is normal color  Doppler flow within these vessels. There is normal phasic, variable pulse wave  Doppler flow from the popliteal vein to the common femoral vein. ASSESSMENT:    Problem List  Date Reviewed: 2/20/2018          Codes Class Noted    * (Principal)Septic arthritis of knee, left ([facility] ICD-10-CM: M00.9  ICD-9-CM: 711.06  2/21/2018        MRSA bacteremia ICD-10-CM: R78.81  ICD-9-CM: 790.7, 041.12  2/21/2018        Cellulitis ICD-10-CM: L03.90  ICD-9-CM: 682.9  2/15/2018        Leukocytosis ICD-10-CM: G77.057  ICD-9-CM: 288.60  2/13/2018        Hypoalbuminemia ICD-10-CM: E88.09  ICD-9-CM: 273.8  2/13/2018        Anemia ICD-10-CM: D64.9  ICD-9-CM: 285.9  2/13/2018        Left leg cellulitis ICD-10-CM: L03.116  ICD-9-CM: 682.6  2/13/2018        Abnormal transaminases ICD-10-CM: R74.8  ICD-9-CM: 790.4  2/13/2018        Thrombocytosis ([facility] ICD-10-CM: D47.3  ICD-9-CM: 238.71  2/13/2018                PLAN:  58 y/o admitted for MRSA bacteremia due to septic arthritis, noted to have Hgb as low as 6.7, requiring transfusion. Iron studies consistent with anemia of chronic disease, soluble transferrin receptor returned elevated. No signs of hemolysis. Give Infed 1000 mg x 1 dose. He is planning to return home to [place] - can follow with his PCP there.                Reema Solano NP   [address] Hematology Oncology  [address] [place] [address]  Office : [phone]  Fax : [phone]

## 2018-03-04 NOTE — PROGRESS NOTES
ORTHO    Sub:  Pt complains of expected pain. Obj:    Vitals:    03/03/18 1924 03/04/18 0023 03/04/18 0413 03/04/18 0717   BP: 130/64 142/81 141/80 141/86   Pulse: (!) 110 87 85 94   Resp: 17 18 15 16   Temp: 98.7 °F (37.1 °C) 98.9 °F (37.2 °C) 98.3 °F (36.8 °C) 97.4 °F (36.3 °C)   SpO2: 93% 99% 98% 96%   Weight:       Height:             Recent Labs      03/03/18   0631  03/02/18   0643   WBC  8.4  9.3   HGB  8.1*  8.5*                 Dressing is dry and intact. Calves are soft and non tender. There is no redness or appearance of infection. N/V status is good. MRI:  To my review I don't see any large loculations or abscess that needs drainage now. Assessment:  Pt improving. Plan:   Continue with Physical Therapy.               Genie Rowan MD

## 2018-03-04 NOTE — PROGRESS NOTES
Problem: Mobility Impaired (Adult and Pediatric)  Goal: *Acute Goals and Plan of Care (Insert Text)  LTG:  (1.)Mr. Nighat Hurd will perform bed mobility with INDEPENDENCE within 7 treatment day(s). (2.)Mr. Nighat Hurd will transfer from bed to chair and chair to bed with MODIFIED INDEPENDENCE using the least restrictive device within 7 treatment day(s). (3.)Mr. Nighat Hurd will ambulate with MODIFIED INDEPENDENCE for 150+ feet with the least restrictive device within 7 treatment day(s). (4.)Mr. Nighat Hurd will demonstrate GOOD DYNAMIC standing balance utilizing least restrictive assistive device within 7 treatment day(s). (5.)Mr. Nighat Hurd will tolerate 25+ minutes of therapeutic activity/exercise while maintaining stable vitals to improve functional strength and endurance within 7 day(s). _______________________________________________________________________________________________  PHYSICAL THERAPY: Daily Note, Treatment Day: 6th, AM 3/4/2018  INPATIENT: Hospital Day: 12  Payor: BLUE CROSS / Plan: [id number] / Product Type: PPO /      Lambert Hare in place at all times. NAME/AGE/GENDER: Colleen Bhatt is a 57 y.o. male   PRIMARY DIAGNOSIS: Open wound of left knee, initial encounter [S81.002A] Septic arthritis of knee, left (HCC) Septic arthritis of knee, left (HCC)  Procedure(s) (LRB):  IRRIGATION AND DEBRIDEMENT LEFT KNEE/ BEING ADMITTED FROM [place] [id number] ON 2.20 (Left)  11 Days Post-Op  ICD-10: Treatment Diagnosis:   · Generalized Muscle Weakness (M62.81)  · Difficulty in walking, Not elsewhere classified (R26.2)   Precaution/Allergies:  Review of patient's allergies indicates no known allergies. ASSESSMENT:     Mr. Nighat Hurd is a 57year old male admitted with left knee infection  s/p L knee irigation and debridement. Patient was sitting up in recliner  chair upon contact and agreeable to PT. MD visits while this writer is in the room. Transfer training activities with supervision.   Patient is rolled to the gym in the w/c and participates in group exercises. Tolerated well. Patient propels himself back to his room and transfers to the  recliner chair. Patient is encouraged to elevate the LLE. Patient continues to be functioning well below his baseline as he does not usually ambulate with an assistive device. Would benefit from continued skilled PT post acute care stay. Overall slow, steady progress towards physical therapy goals. No goals have been met thus far. Will continue efforts. This section established at most recent assessment   PROBLEM LIST (Impairments causing functional limitations):  1. Decreased Strength  2. Decreased ADL/Functional Activities  3. Decreased Transfer Abilities  4. Decreased Ambulation Ability/Technique  5. Decreased Balance  6. Increased Pain  7. Decreased Activity Tolerance  8. Decreased Flexibility/Joint Mobility  9. Decreased Knowledge of Precautions  10. Decreased [place] with Home Exercise Program   INTERVENTIONS PLANNED: (Benefits and precautions of physical therapy have been discussed with the patient.)  1. Balance Exercise  2. Bed Mobility  3. Family Education  4. Gait Training  5. Range of Motion (ROM)  6. Therapeutic Activites  7. Transfer Training  8. Group Therapy     TREATMENT PLAN: Frequency/Duration: 3 times a week for duration of hospital stay  Rehabilitation Potential For Stated Goals: GOOD     RECOMMENDED REHABILITATION/EQUIPMENT: (at time of discharge pending progress): Due to the probability of continued deficits (see above) this patient will likely need continued skilled physical therapy after discharge. Equipment:    Walkers, Type: Rolling Walker              HISTORY:   History of Present Injury/Illness (Reason for Referral):  S/p left knee I&D  Past Medical History/Comorbidities:   Mr. Carolin Wang  has no past medical history on file. Mr. Carolin Wang  has a past surgical history that includes hx orthopaedic.   Social History/Living Environment:   Home Environment: Private residence  Wheelchair Ramp: Yes  One/Two Story Residence: One story  Living Alone: Yes  Support Systems: Family member(s)  Patient Expects to be Discharged to[de-identified] Private residence  Current DME Used/Available at Home: Cane, straight  Prior Level of Function/Work/Activity:   Patient lives alone in a single story residence with 0 steps to enter. At baseline, patient is independent with ADLs, ambulates without utilizing an assistive device, and endorses 0 recent falls. Patient with difficult history to fall regarding falls and L LE pain. Number of Personal Factors/Comorbidities that affect the Plan of Care: 1-2: MODERATE COMPLEXITY   EXAMINATION:   Most Recent Physical Functioning:   Gross Assessment:                  Posture:     Balance:  Sitting: Intact  Standing: Intact  Standing - Static: Good  Standing - Dynamic : Fair Bed Mobility:     Wheelchair Mobility:     Transfers:  Sit to Stand: Supervision  Stand to Sit: Supervision  Gait:            Body Structures Involved:  1. Bones  2. Joints  3. Muscles Body Functions Affected:  1. Neuromusculoskeletal  2. Movement Related Activities and Participation Affected:  1. Mobility  2. Self Care   Number of elements that affect the Plan of Care: 4+: HIGH COMPLEXITY   CLINICAL PRESENTATION:   Presentation: Stable and uncomplicated: LOW COMPLEXITY   CLINICAL DECISION MAKIN [facility] [id number] AM-PAC 6 Clicks   Basic Mobility Inpatient Short Form  How much difficulty does the patient currently have. .. Unable A Lot A Little None   1. Turning over in bed (including adjusting bedclothes, sheets and blankets)? [] 1   [] 2   [] 3   [x] 4   2. Sitting down on and standing up from a chair with arms ( e.g., wheelchair, bedside commode, etc.)   [] 1   [] 2   [x] 3   [] 4   3. Moving from lying on back to sitting on the side of the bed? [] 1   [] 2   [x] 3   [] 4   How much help from another person does the patient currently need. ..  Total A Lot A Little None   4. Moving to and from a bed to a chair (including a wheelchair)? [] 1   [] 2   [x] 3   [] 4   5. Need to walk in hospital room? [] 1   [] 2   [x] 3   [] 4   6. Climbing 3-5 steps with a railing? [] 1   [x] 2   [] 3   [] 4   © 2007, Trustees of [address] [place] [address] [address] [address], under license to [employer]. All rights reserved      Score:  Initial: 18 Most Recent: 18 (Date: 2/23/18 )    Interpretation of Tool:  Represents activities that are increasingly more difficult (i.e. Bed mobility, Transfers, Gait). Score 24 23 22-20 19-15 14-10 9-7 6     Modifier CH CI CJ CK CL CM CN      ? Mobility - Walking and Moving Around:     - CURRENT STATUS: CK - 40%-59% impaired, limited or restricted    - GOAL STATUS: CJ - 20%-39% impaired, limited or restricted    - D/C STATUS:  ---------------To be determined---------------  Payor: BLUE CROSS / Plan: [place]UE CROSS OF [address] / Product Type: PPO /      Medical Necessity:     · Patient demonstrates good rehab potential due to higher previous functional level. Reason for Services/Other Comments:  · Patient continues to require skilled intervention due to decreased functional mobility and balance/gait status from baseline. .   Use of outcome tool(s) and clinical judgement create a POC that gives a: Clear prediction of patient's progress: LOW COMPLEXITY            TREATMENT:   (In addition to Assessment/Re-Assessment sessions the following treatments were rendered)   Pre-treatment Symptoms/Complaints:  \"I am ready\"  Pain: Initial:   Pain Intensity 1: 0  Post Session:  0/10     Therapeutic Activity: (    10 Minutes): Therapeutic activities including transfer training, ambulation on level ground, static/dynamic standing balance activities, instruction in sequencing with rolling walker, scooting, and patient education to improve mobility, strength and balance.   Required contact guard to close supervision   to promote static and dynamic balance in standing and promote coordination of bilateral, lower extremity(s). Therapeutic group  Exercise: ( 20  Minutes):  Exercises per grid below to improve mobility, strength, balance and stiffness/soreness. Required moderate visual, verbal, manual and tactile cues to promote proper body alignment, promote proper body posture and promote proper body mechanics. Progressed range, repetitions and complexity of movement as indicated. Date 3/318 Date: 3/4/18 Date:   ACTIVITY/EXERCISE AM PM AM PM AM PM   Ambulation:           Distance  Device  Duration         Seated Heel Raises 30  20      Seated Toe Raises 30  20      Seated Long Arc Quads 30  20      Seated Marching 30  20      Seated Hip Abduction 30  20      Seated heel slides         Seated quad sets         B = bilateral; AA = active assistive; A = active; P = passive        Braces/Orthotics/Lines/Etc:   · None  Treatment/Session Assessment:    · Response to Treatment:  Tolerated fair to well  · Interdisciplinary Collaboration:   o Physical Therapy Assistant  o Registered Nurse  · After treatment position/precautions:   o Up in chair  o Bed alarm/tab alert on  o Bed/Chair-wheels locked  o Call light within reach  o RN notified  o All needs within reach   · Compliance with Program/Exercises: compliant encourage elevation  of LLE  · Recommendations/Intent for next treatment session: \"Next visit will focus on advancements to more challenging activities and reduction in assistance provided\".   Total Treatment Duration:  PT Patient Time In/Time Out  Time In: 1030 (11:10)  Time Out: 1140 (11:30)    Lindy Cuellar-Franky, PTA

## 2018-03-04 NOTE — PROGRESS NOTES
Hospitalist Progress Note    3/4/2018  Admit Date: 2018  9:12 AM   NAME: Shaun Records   :  1955   MRN:  [id number]   Attending: Ling Alicea. Joe Uribe MD  PCP:  None    SUBJECTIVE:   Patient presented with leg pain and swelling. Was found to have persistent Blood cultures positive for MRSA bacteremia. ECHO negative for vegetations. Due to poor improvement of his thigh and leg edema and pain he underwent an MRI of his leg showing multiloculated abscesses and findings of septic knee. He underwent I&D and knee lavage on 18 by general surgery. Orthopedics performed knee aspiration. So far, all BC and wound aspirate and cultures are positive for staph aureus. ID consulted and have continued vancomycin through 3/20.      3/4:  OOB to chair this am.  Anxious to start walking halls again      Review of Systems negative with exception of pertinent positives noted above  PHYSICAL EXAM     Visit Vitals    /86 (BP 1 Location: Left arm, BP Patient Position: At rest)    Pulse 94    Temp 97.4 °F (36.3 °C)    Resp 16    Ht 5' 8\" (1.727 m)    Wt 113.4 kg (250 lb)    SpO2 96%    BMI 38.01 kg/m2      Temp (24hrs), Av.2 °F (36.8 °C), Min:97.4 °F (36.3 °C), Max:98.9 °F (37.2 °C)    Oxygen Therapy  O2 Sat (%): 96 % (18 0717)  Pulse via Oximetry: 85 beats per minute (18 0413)  O2 Device: Room air (18 0413)  O2 Flow Rate (L/min): 3 l/min (18 7237)  No intake or output data in the 24 hours ending 18 0950   General: No acute distress    Lungs:  CTA Bilaterally.    Heart:  Regular rate and rhythm,  No murmur, rub, or gallop  Abdomen: Soft, Non distended, Non tender, Positive bowel sounds  Extremities: Left knee wrapped this am  Neurologic:  No focal deficits    ASSESSMENT      Active Hospital Problems    Diagnosis Date Noted    Septic arthritis of knee, left ([address] 2018    MRSA bacteremia 2018    Left leg cellulitis 2018    Anemia 2018    Abnormal transaminases 02/13/2018    Leukocytosis 02/13/2018    Hypoalbuminemia 02/13/2018     Plan:  · Continue vancomycin. BC from 2/19 are negative. Plan for vanco through 3/20  · Repeat MRI knee 3/3 pending read. · Will need snf. SW following.      · Appreciate specialist input    DVT Prophylaxis: heparin     Signed By: Thao Cardona MD     March 4, 2018

## 2018-03-05 ENCOUNTER — APPOINTMENT (OUTPATIENT)
Dept: MRI IMAGING | Age: 63
DRG: 853 | End: 2018-03-05
Attending: NURSE PRACTITIONER
Payer: COMMERCIAL

## 2018-03-05 LAB — VANCOMYCIN TROUGH SERPL-MCNC: 14.8 UG/ML (ref 5–20)

## 2018-03-05 PROCEDURE — 74011250636 HC RX REV CODE- 250/636: Performed by: NURSE PRACTITIONER

## 2018-03-05 PROCEDURE — 97164 PT RE-EVAL EST PLAN CARE: CPT

## 2018-03-05 PROCEDURE — 74011250637 HC RX REV CODE- 250/637: Performed by: FAMILY MEDICINE

## 2018-03-05 PROCEDURE — 97116 GAIT TRAINING THERAPY: CPT

## 2018-03-05 PROCEDURE — 65270000029 HC RM PRIVATE

## 2018-03-05 PROCEDURE — 97535 SELF CARE MNGMENT TRAINING: CPT

## 2018-03-05 PROCEDURE — 77030018719 HC DRSG PTCH ANTIMIC J&J -A

## 2018-03-05 PROCEDURE — 74011250636 HC RX REV CODE- 250/636: Performed by: INTERNAL MEDICINE

## 2018-03-05 PROCEDURE — 73718 MRI LOWER EXTREMITY W/O DYE: CPT

## 2018-03-05 PROCEDURE — 74011000250 HC RX REV CODE- 250: Performed by: INTERNAL MEDICINE

## 2018-03-05 PROCEDURE — 97150 GROUP THERAPEUTIC PROCEDURES: CPT

## 2018-03-05 PROCEDURE — 80202 ASSAY OF VANCOMYCIN: CPT | Performed by: INTERNAL MEDICINE

## 2018-03-05 RX ORDER — VANCOMYCIN 1.75 GRAM/500 ML IN 0.9 % SODIUM CHLORIDE INTRAVENOUS
1750 EVERY 12 HOURS
Status: DISCONTINUED | OUTPATIENT
Start: 2018-03-06 | End: 2018-03-07 | Stop reason: HOSPADM

## 2018-03-05 RX ADMIN — DOCUSATE SODIUM 100 MG: 100 CAPSULE, LIQUID FILLED ORAL at 18:13

## 2018-03-05 RX ADMIN — Medication 300 UNITS: at 22:54

## 2018-03-05 RX ADMIN — WATER 2 MG: 1 INJECTION INTRAMUSCULAR; INTRAVENOUS; SUBCUTANEOUS at 13:14

## 2018-03-05 RX ADMIN — Medication 300 UNITS: at 06:28

## 2018-03-05 RX ADMIN — VANCOMYCIN HYDROCHLORIDE 1500 MG: 10 INJECTION, POWDER, LYOPHILIZED, FOR SOLUTION INTRAVENOUS at 01:02

## 2018-03-05 RX ADMIN — OXYCODONE HYDROCHLORIDE AND ACETAMINOPHEN 2 TABLET: 5; 325 TABLET ORAL at 09:17

## 2018-03-05 RX ADMIN — DOCUSATE SODIUM 100 MG: 100 CAPSULE, LIQUID FILLED ORAL at 09:17

## 2018-03-05 RX ADMIN — Medication 10 ML: at 22:54

## 2018-03-05 RX ADMIN — OXYCODONE HYDROCHLORIDE AND ACETAMINOPHEN 2 TABLET: 5; 325 TABLET ORAL at 14:32

## 2018-03-05 RX ADMIN — Medication 10 ML: at 06:29

## 2018-03-05 RX ADMIN — OXYCODONE HYDROCHLORIDE AND ACETAMINOPHEN 2 TABLET: 5; 325 TABLET ORAL at 18:13

## 2018-03-05 RX ADMIN — Medication 300 UNITS: at 12:55

## 2018-03-05 RX ADMIN — Medication 10 ML: at 12:56

## 2018-03-05 RX ADMIN — VANCOMYCIN HYDROCHLORIDE 1500 MG: 10 INJECTION, POWDER, LYOPHILIZED, FOR SOLUTION INTRAVENOUS at 16:10

## 2018-03-05 NOTE — PROGRESS NOTES
Left knee incision is not intact. Approximated 5 cm open incision with large amount of sanguinous drainage. Steri- strips are not adhering to the skin at the bend of the knee. Left leg is + 3 pitting edema. Sterile dressing change done. Patient stated, \" the doctor told me not to wear the knee immobilizer. He actually took it off. \"

## 2018-03-05 NOTE — PROGRESS NOTES
Vancomycin given late because PICC line would not draw blood; cathflow given to pt; the allotted amount of time passed for the medication to work; PICC line draws blood at this time; Vancomycin trough drawn; resulted in the 1400 hour. Two attempts were made to contact pharmacy about changing the due time to given the vancomycin; the third attempt was a phone, at 1600 hours; the pharmacist Deandre Angela informed the primary nurse that the medication cannot be adjusted until the first dose was given; Will hang Vancomycin now and all future doses will be adjusted by the pharmacy accordingly.

## 2018-03-05 NOTE — PROGRESS NOTES
I had a conversation with Leonides Duarte from [facility] about patient's MRSA and contact status. Patient may leave his room and walk the halls after he has donned a clean hospital gown and washed his hands thoroughly with soap and water.

## 2018-03-05 NOTE — PROGRESS NOTES
Problem: Falls - Risk of  Goal: *Absence of Falls  Document Kinjal Fall Risk and appropriate interventions in the flowsheet.    Outcome: Progressing Towards Goal  Fall Risk Interventions:  Mobility Interventions: Bed/chair exit alarm, Communicate number of staff needed for ambulation/transfer, OT consult for ADLs, Patient to call before getting OOB, PT Consult for mobility concerns, PT Consult for assist device competence, Utilize walker, cane, or other assitive device, Strengthening exercises (ROM-active/passive), Utilize gait belt for transfers/ambulation    Mentation Interventions: Adequate sleep, hydration, pain control, Bed/chair exit alarm, Door open when patient unattended, Evaluate medications/consider consulting pharmacy, Eyeglasses and hearing aids, Familiar objects from home, Family/sitter at bedside, Gait belt with transfers/ambulation, Increase mobility, More frequent rounding, Reorient patient, Room close to nurse's station, Self-releasing belt, Toileting rounds, Update white board    Medication Interventions: Assess postural VS orthostatic hypotension, Bed/chair exit alarm, Evaluate medications/consider consulting pharmacy, Patient to call before getting OOB, Teach patient to arise slowly, Utilize gait belt for transfers/ambulation    Elimination Interventions: Call light in reach

## 2018-03-05 NOTE — PROGRESS NOTES
Problem: Mobility Impaired (Adult and Pediatric)  Goal: *Acute Goals and Plan of Care (Insert Text)  UPDATED 3/5/18  1. Mr. Viviana Cheema will perform transfers with INDEPENDENCE within 7 treatment day(s). 2. Mr. Viviana Cheema will ambulate x500ft with RW and MODIFIED INDEPENDENCE within 7 treatment day(s). 3. Mr. Viviana Cheema will demonstrate >90 degrees of L knee flexion within 7 treatment day(s). 4. Mr. Viviana Cheema will demonstrate <-5 decrees of L knee extension within 7 treatment day(s). 5. Mr. Viviana Cheema will tolerate 25+ minutes of standing therapeutic activity/exercises with a <2point increase in pain from rest within 7 treatment day(s). Previous Goals:      _______________________________________________________________________________________________  PHYSICAL THERAPY: Re-evaluation, Treatment Day: Day of Assessment, AM 3/5/2018  INPATIENT: Hospital Day: 13  Payor[place] BLUE CR[place] / [place] [facility] / Product Type: PPO /      WBAT L LE     NAME/AGE/GENDER: Patricia South is a 57 y.o. male   PRIMARY DIAGNOSIS: Open wound of left knee, initial encounter [S81.002A] Septic arthritis of knee, left (HCC) Septic arthritis of knee, left (HCC)  Procedure(s) (LRB):  IRRIGATION AND DEBRIDEMENT LEFT KNEE/ BEING ADMITTED FROM [place] [address] ON 2.20 (Left)  12 Days Post-Op  ICD-10: Treatment Diagnosis:   · Generalized Muscle Weakness (M62.81)  · Difficulty in walking, Not elsewhere classified (R26.2)   Precaution/Allergies:  Review of patient's allergies indicates no known allergies. ASSESSMENT:     Mr. Viviana Cheema is a 57year old male admitted with left knee infection and 12 days s/p L knee irigation and debridement. Patient is WBAT with no KI per orthopedic surgery. Patient seen this AM for physical therapy re-assessment to address progression toward acute PT goals. At baseline, patient is an independent, community level ambulator and independent with ADLs.  Today, left knee flexion/extension with mild-moderate functional limitations (unable to measure goniometrically secondary to bulky dressing) per chart review has been improving each treatment session per PTA documentation, L LE strength functionally 3+/5, R LE strength 5/5 and ROM WNL, and B UE strength/ROM WNL. Sensation remains intact to light touch distal B LE. Edema appreciated in B LE L>R. Patient is currently able to perform transfers with supervision and ambulation [id number] with RW and SBA. Patient demonstrates a slow, step-through gait pattern with decreased step length on right, decreased stance time on left, decreased heel strike on left, and fair+ dynamic balance throughout. Patient's foot clearance and gait mechanics overall improved. Patient reports \"minimal\" 4/10 pain in weight bearing through R LE. Mr. Brenda Orozco has made excellent progress toward acute PT goals; plan of care updated and noted above. Will continue to follow and progress toward stated goals. Recommend continued skilled PT services  This section established at most recent assessment   PROBLEM LIST (Impairments causing functional limitations):  1. Decreased Strength  2. Decreased ADL/Functional Activities  3. Decreased Transfer Abilities  4. Decreased Ambulation Ability/Technique  5. Decreased Balance  6. Increased Pain  7. Decreased Activity Tolerance  8. Decreased Flexibility/Joint Mobility  9. Decreased Knowledge of Precautions  10. Decreased [place] with Home Exercise Program   INTERVENTIONS PLANNED: (Benefits and precautions of physical therapy have been discussed with the patient.)  1. Balance Exercise  2. Bed Mobility  3. Family Education  4. Gait Training  5. Range of Motion (ROM)  6. Therapeutic Activites  7. Transfer Training  8.  Group Therapy     TREATMENT PLAN: Frequency/Duration: 3 times a week for duration of hospital stay  Rehabilitation Potential For Stated Goals: GOOD     RECOMMENDED REHABILITATION/EQUIPMENT: (at time of discharge pending progress): Due to the probability of continued deficits (see above) this patient will likely need continued skilled physical therapy after discharge. Equipment:    Walkers, Type: Rolling Walker              HISTORY:   History of Present Injury/Illness (Reason for Referral):  S/p left knee I&D  Past Medical History/Comorbidities:   Mr. Iker Blanco  has no past medical history on file. Mr. Iker Blanco  has a past surgical history that includes hx orthopaedic. Social History/Living Environment:   Home Environment: Private residence  Wheelchair Ramp: Yes  One/Two Story Residence: One story  Living Alone: Yes  Support Systems: Family member(s)  Patient Expects to be Discharged to[de-identified] Private residence  Current DME Used/Available at Home: Cane, straight  Prior Level of Function/Work/Activity:   Patient lives alone in a single story residence with 0 steps to enter. At baseline, patient is independent with ADLs, ambulates without utilizing an assistive device, and endorses 0 recent falls. Patient with difficult history to fall regarding falls and L LE pain. Number of Personal Factors/Comorbidities that affect the Plan of Care: 1-2: MODERATE COMPLEXITY   EXAMINATION:   Most Recent Physical Functioning:   Gross Assessment:  AROM: Generally decreased, functional (L LE)  Strength: Generally decreased, functional (L LE)  Coordination: Within functional limits  Sensation: Intact (Light touch distal B UE/LE)               Posture:  Posture (WDL): Exceptions to WDL  Posture Assessment: Forward head  Balance:  Sitting: Intact  Standing: Impaired  Standing - Static: Good  Standing - Dynamic : Fair Bed Mobility:  Scooting: Supervision  Wheelchair Mobility:     Transfers:  Sit to Stand: Supervision  Stand to Sit: Supervision  Bed to Chair: Stand-by assistance  Gait:     Speed/Rosalie: Slow  Step Length: Right shortened  Swing Pattern: Left asymmetrical  Stance: Right increased; Left decreased  Gait Abnormalities: Antalgic;Decreased step clearance  Distance (ft): 300 Feet (ft)  Ambulation - Level of Assistance: Stand-by assistance      Body Structures Involved:  1. Bones  2. Joints  3. Muscles Body Functions Affected:  1. Neuromusculoskeletal  2. Movement Related Activities and Participation Affected:  1. Mobility  2. Self Care   Number of elements that affect the Plan of Care: 4+: HIGH COMPLEXITY   CLINICAL PRESENTATION:   Presentation: Stable and uncomplicated: LOW COMPLEXITY   CLINICAL DECISION MAKIN[address] [place] [address] AM-PAC 6 Clicks   Basic Mobility Inpatient Short Form  How much difficulty does the patient currently have. .. Unable A Lot A Little None   1. Turning over in bed (including adjusting bedclothes, sheets and blankets)? [] 1   [] 2   [] 3   [x] 4   2. Sitting down on and standing up from a chair with arms ( e.g., wheelchair, bedside commode, etc.)   [] 1   [] 2   [] 3   [x] 4   3. Moving from lying on back to sitting on the side of the bed? [] 1   [] 2   [x] 3   [] 4   How much help from another person does the patient currently need. .. Total A Lot A Little None   4. Moving to and from a bed to a chair (including a wheelchair)? [] 1   [] 2   [x] 3   [] 4   5. Need to walk in hospital room? [] 1   [] 2   [x] 3   [] 4   6. Climbing 3-5 steps with a railing? [] 1   [] 2   [x] 3   [] 4   © , Trustees of [address] [place] [address], under license to [employer]. All rights reserved      Score:  Initial: 18 Most Recent: 20 (Date: 3/5/18 )    Interpretation of Tool:  Represents activities that are increasingly more difficult (i.e. Bed mobility, Transfers, Gait). Score 24 23 22-20 19-15 14-10 9-7 6     Modifier CH CI CJ CK CL CM CN      ?  Mobility - Walking and Moving Around:     - CURRENT STATUS: CJ - 20%-39% impaired, limited or restricted    - GOAL STATUS: CI - 1%-19% impaired, limited or restricted    - D/C STATUS:  ---------------To be determined---------------  Payor: BLUE CR[place] / [place] OF [address] / Product Type: PPO /      Medical Necessity:     · Patient demonstrates good rehab potential due to higher previous functional level. Reason for Services/Other Comments:  · Patient continues to require skilled intervention due to decreased functional mobility and balance/gait status from baseline. .   Use of outcome tool(s) and clinical judgement create a POC that gives a: Clear prediction of patient's progress: LOW COMPLEXITY            TREATMENT:   (In addition to Assessment/Re-Assessment sessions the following treatments were rendered)   Pre-treatment Symptoms/Complaints:  \"I am ready\"  Pain: Initial:   Pain Intensity 1: 4  Pain Location 1: Knee  Pain Orientation 1: Left  Pain Intervention(s) 1: Ambulation/Increased Activity, Emotional support, Repositioned, Rest  Post Session:  0/10     Physical Therapy Re-Assessment  Gait Training ( 10 Minutes):  Gait training to improve and/or restore physical functioning as related to mobility, strength and coordination. Ambulated 300 Feet (ft) with minimal assistance and minimal visual, verbal and tactile cues related to their gait mechanics, dynamic standing balance, and posture. Assistive Device: Walker, rolling  Ambulation - Level of Assistance: Stand-by assistance  Distance (ft): 300 Feet (ft)  Interventions: Safety awareness training, Tactile cues, Verbal cues    Braces/Orthotics/Lines/Etc:   · None  Treatment/Session Assessment:    · Response to Treatment:  Tolerated fair to well  · Interdisciplinary Collaboration:   o Physical Therapist  o Physical Therapy Assistant  o Registered Nurse  · After treatment position/precautions:   o Up in chair  o Bed/Chair-wheels locked  o Bed in low position  o Call light within reach  o RN notified  o Needs met; educated patient to call for assistance out of bed/chair; educated on fall risk; verbalized understanding.    · Compliance with Program/Exercises: compliant encourage elevation  of LLE  · Recommendations/Intent for next treatment session: \"Next visit will focus on advancements to more challenging activities and reduction in assistance provided\".     Total Treatment Duration:  PT Patient Time In/Time Out  Time In: 1140  Time Out: 121 [facility]

## 2018-03-05 NOTE — PROGRESS NOTES
Problem: Self Care Deficits Care Plan (Adult)  Goal: *Acute Goals and Plan of Care (Insert Text)  1. Patient will complete functional transfers with supervision with WBAT status in LLE with knee imobilizer in place and with adaptive equipment as needed. 3. Patient will complete lower body bathing and dressing with minimal assistance and adaptive equipment as needed. 4. Patient will complete toileting and toilet transfer with supervision. (Goal Met)  5. Patient will tolerate 30 minutes of OT treatment with 2-3 rest breaks to increase activity tolerance for ADLs. 6. Patient will participate in at least 15 minutes of BUE therapeutic exercises to strengthen BUE for functional transfers. (Progressing)    Timeframe: 7 visits         OCCUPATIONAL THERAPY: Daily Note, Treatment Day: 3rd and AM    3/5/2018  INPATIENT: Hospital Day: 13  Payor: BLUE CR[place] / [place] [facility] / Product Type: PPO /      NAME/AGE/GENDER: Emerita Musa is a 57 y.o. male   PRIMARY DIAGNOSIS:  Open wound of left knee, initial encounter [S81.002A] Septic arthritis of knee, left (HCC) Septic arthritis of knee, left (HCC)  Procedure(s) (LRB):  IRRIGATION AND DEBRIDEMENT LEFT KNEE/ BEING ADMITTED FROM [place] [address] ON 2.20 (Left)  12 Days Post-Op  ICD-10: Treatment Diagnosis:    · Pain in Left Knee (M25.562)  · Stiffness of Left Knee, Not elsewhere classified (M25.662)  · Generalized Muscle Weakness (M62.81)  · Other lack of cordination (R27.8)  · History of falling (Z91.81)   Precautions/Allergies:     Review of patient's allergies indicates no known allergies. ASSESSMENT:   Mr. William Castillo was admitted for the above diagnosis. 3/5/2018 Pt was getting up in his room on his own without AD upon arrival. Pt completed mobility into the bathroom and completed toileting independently. Pt mobilized to the sink side and and completed hand washing with supervision.  Pt was offered a rolling walker, but \"furniture cruised\" as he transferred to a w/c. Pt was transferred down to the gym in a wheel chair to participate in group exercises to increase strength for mobility and ADL's. Pt required visual, verbal, and manual cues to complete exercises with proper form. Pt left up in the chair to work with the PT. Continue OT POC. This section established at most recent assessment   PROBLEM LIST (Impairments causing functional limitations):  1. Decreased Strength  2. Decreased ADL/Functional Activities  3. Decreased Transfer Abilities  4. Decreased Ambulation Ability/Technique  5. Decreased Balance  6. Increased Pain  7. Decreased Activity Tolerance  8. Decreased Flexibility/Joint Mobility  9. Edema/Girth  10. Decreased Knowledge of Precautions  11. Decreased [place] with Home Exercise Program   INTERVENTIONS PLANNED: (Benefits and precautions of occupational therapy have been discussed with the patient.)  1. Activities of daily living training  2. Adaptive equipment training  3. Balance training  4. Clothing management  5. Donning&doffing training  6. Group therapy  7. Neuromuscular re-eduation  8. Therapeutic activity  9. Therapeutic exercise     TREATMENT PLAN: Frequency/Duration: Follow patient 3 times per week to address above goals. Rehabilitation Potential For Stated Goals: Excellent     RECOMMENDED REHABILITATION/EQUIPMENT: (at time of discharge pending progress): Due to the probability of continued deficits (see above) this patient will likely need continued skilled occupational therapy after discharge. Equipment:    TBD              OCCUPATIONAL PROFILE AND HISTORY:   History of Present Injury/Illness (Reason for Referral):  See H&P  Past Medical History/Comorbidities:   Mr. Uche Ledesma  has no past medical history on file. Mr. Uche Ledesma  has a past surgical history that includes hx orthopaedic.   Social History/Living Environment:   Home Environment: Private residence  Wheelchair Ramp: Yes  One/Two Story Residence: One story  Living Alone: Yes  Support Systems: Family member(s)  Patient Expects to be Discharged to[de-identified] Private residence  Current DME Used/Available at Home: Cane, straight  Prior Level of Function/Work/Activity:  Pt reports that he lives in one story home with ramp to enter in [address] [place] [address]. Pt states he completes functional mobility/ADL with independence. Pt does have a cane and rolling walker. Pt state that his wife passed away but unclear if he lives alone or if he is remarried. Personal Factors:          Past/Current Experience:  Infection of knee with recent I &D        Overall Behavior:  Demanding; lacks some awareness of need for safety/assistance          Number of Personal Factors/Comorbidities that affect the Plan of Care: Expanded review of therapy/medical records (1-2):  MODERATE COMPLEXITY   ASSESSMENT OF OCCUPATIONAL PERFORMANCE[de-identified]   Activities of Daily Living:           Basic ADLs (From Assessment) Complex ADLs (From Assessment)   Basic ADL  Feeding: Independent  Oral Facial Hygiene/Grooming: Stand-by assistance  Bathing: Moderate assistance  Upper Body Dressing: Stand-by assistance  Lower Body Dressing: Maximum assistance  Toileting: Moderate assistance Instrumental ADL  Meal Preparation: Maximum assistance  Homemaking:  Total assistance   Grooming/Bathing/Dressing Activities of Daily Living   Grooming  Washing Face: Supervision/set-up  Washing Hands: Supervision/set-up                   Functional Transfers  Bathroom Mobility: Supervision/set up  Toilet Transfer : Independent     Bed/Mat Mobility  Sit to Stand: Supervision       Most Recent Physical Functioning:   Gross Assessment:                  Posture:  Posture (WDL): Exceptions to WDL  Balance:  Standing: Impaired  Standing - Static: Fair (without AD)  Standing - Dynamic : Fair (without AD) Bed Mobility:     Wheelchair Mobility:     Transfers:  Sit to Stand: Supervision  Stand to Sit: Supervision                Patient Vitals for the past 6 hrs:   BP BP Patient Position SpO2 Pulse   18 0727 (!) 152/91 Sitting 100 % 93   18 1050 133/79 Sitting 97 % 87       Mental Status  Neurologic State: Alert  Orientation Level: Appropriate for age  Cognition: Appropriate decision making, Appropriate for age attention/concentration, Appropriate safety awareness  Perception: Appears intact  Perseveration: No perseveration noted  Safety/Judgement: Awareness of environment                          Physical Skills Involved:  1. Range of Motion  2. Balance  3. Strength  4. Activity Tolerance  5. Sensation  6. Pain (acute)  7. Edema Cognitive Skills Affected (resulting in the inability to perform in a timely and safe manner):  1. [facility] Psychosocial Skills Affected:  1. Habits/Routines  2. Social Interaction  3. Self-Awareness   Number of elements that affect the Plan of Care: 5+:  HIGH COMPLEXITY   CLINICAL DECISION MAKIN[address] [place] [id number] AM-PAC 6 Clicks   Daily Activity Inpatient Short Form  How much help from another person does the patient currently need. .. Total A Lot A Little None   1. Putting on and taking off regular lower body clothing? [] 1   [x] 2   [] 3   [] 4   2. Bathing (including washing, rinsing, drying)? [] 1   [x] 2   [] 3   [] 4   3. Toileting, which includes using toilet, bedpan or urinal?   [] 1   [x] 2   [] 3   [] 4   4. Putting on and taking off regular upper body clothing? [] 1   [] 2   [x] 3   [] 4   5. Taking care of personal grooming such as brushing teeth? [] 1   [] 2   [x] 3   [] 4   6. Eating meals? [] 1   [] 2   [x] 3   [] 4   © , Trustees of [address] [place] [address], under license to [employer]. All rights reserved      Score:  Initial: 15 Most Recent: X (Date: -- )    Interpretation of Tool:  Represents activities that are increasingly more difficult (i.e. Bed mobility, Transfers, Gait). Score 24 23 22-20 19-15 14-10 9-7 6     Modifier CH CI CJ CK CL CM CN      ?  Self Care:     - CURRENT STATUS: CK - 40%-59% impaired, limited or restricted    - GOAL STATUS: CJ - 20%-39% impaired, limited or restricted    - D/C STATUS:  ---------------To be determined---------------  Payor[place] BLUE CR[place] / [place] [facility] / Product Type: PPO /      Medical Necessity:     · Patient demonstrates excellent rehab potential due to higher previous functional level. Reason for Services/Other Comments:  · Patient continues to require skilled intervention due to decreased independence with ADL/functional transfers. Use of outcome tool(s) and clinical judgement create a POC that gives a: LOW COMPLEXITY         TREATMENT:   (In addition to Assessment/Re-Assessment sessions the following treatments were rendered)     Pre-treatment Symptoms/Complaints:    Pain: Initial:   Pain Intensity 1: 0  Post Session:  3/10   Self Care: (8 minutes): Procedure(s) (per grid) utilized to improve and/or restore self-care/home management as related to toileting and grooming. Required minimal visual, verbal and   cueing to facilitate activities of daily living skills. Group Therapeutic Exercise: (10 minutes):  Exercises per grid below to improve mobility, strength and activity tolerance. Required minimal visual and verbal cues to promote proper body alignment and promote proper body mechanics. Progressed resistance and repetitions as indicated.    Date:  3/5/18 Date:   Date:     Activity/Exercise Parameters Parameters Parameters   Shoulder flexion/extension 15 reps with a 5# dowel     Shoulder horizontal add/abb 15 reps with a red theraband     Punches 15 reps with a 5# dowel     Elbow flexion/extension 15 reps with a 5# dowel     Tricep extension 15 reps with a 5# dowel     Balloon tapping      Ball toss          Braces/Orthotics/Lines/Etc:   · IV  · O2 Device: Room air  Treatment/Session Assessment:    · Response to Treatment: Tolerated well   · Interdisciplinary Collaboration:   o Certified Occupational Therapy Assistant  o Registered Nurse  · After treatment position/precautions:   o Up in chair  o RN notified  o working with the PT   · Compliance with Program/Exercises: Will assess as treatment progresses. · Recommendations/Intent for next treatment session: \"Next visit will focus on advancements to more challenging activities and reduction in assistance provided\".   Total Treatment Duration:  OT Patient Time In/Time Out  Time In: 1100 (5632)  Time Out: 1108 [phone])     8 [facility]

## 2018-03-05 NOTE — PROGRESS NOTES
Pharmacokinetic Consult to Pharmacist    Ashu Jacquelin is a 57 y.o. male being treated for MRSA bacteremia, pyomyositis, and septic arthritis with vancomycin. Height: 5' 8\" (172.7 cm)  Weight: 113.4 kg (250 lb)  Lab Results   Component Value Date/Time    BUN 11 03/03/2018 06:31 AM    Creatinine 1.00 03/03/2018 06:31 AM    WBC 8.4 03/03/2018 06:31 AM      Estimated Creatinine Clearance: 91.2 mL/min (based on Cr of 1). Lab Results   Component Value Date/Time    Vancomycin,trough 14.8 03/05/2018 02:25 PM       EOT per ID is 3/20/18. Day 21 of vancomycin. Goal trough is 15-20. Trough drawn a little late due to issues with PICC line. If drawn on time, would likely have been within therapeutic range, but on the lower end of therapeutic. Would prefer given the nature of infection to target higher end of range, so will increase dose to 1750 mg Q12H. Next trough prior to the 4th dose which should be on 3/7/18. Will continue to follow patient.       Thank you,  Homero Montoya, PharmD  Clinical Pharmacist  [phone]

## 2018-03-05 NOTE — PROGRESS NOTES
Spoke with Dr. Filiberto Jimenez about pt PICC not drawing blood; Dr. Filiberto Jimenez ordered cath flow

## 2018-03-05 NOTE — PROGRESS NOTES
Spoke with Dr. Dann De La Torre about pt H& H of 8.1 and 25.8; Dr. Dann De La Torre ordered a CBC for tomorrow morning.

## 2018-03-05 NOTE — PROGRESS NOTES
Hospitalist Progress Note    3/5/2018  Admit Date: 2018  9:12 AM   NAME: Alison Montague   :  1955   MRN:  [id number]   Attending: Latoya Estrada. Fausto Montesinos MD  PCP:  None    SUBJECTIVE:   Patient presented with leg pain and swelling. Was found to have persistent Blood cultures positive for MRSA bacteremia. ECHO negative for vegetations. Due to poor improvement of his thigh and leg edema and pain he underwent an MRI of his leg showing multiloculated abscesses and findings of septic knee. He underwent I&D and knee lavage on 18 by general surgery. Orthopedics performed knee aspiration. So far, all BC and wound aspirate and cultures are positive for staph aureus. ID consulted and have continued vancomycin through 3/20.      3/5:  Awaiting precert for rehab. Denies any pain in knee      Review of Systems negative with exception of pertinent positives noted above  PHYSICAL EXAM     Visit Vitals    /79 (BP 1 Location: Left arm, BP Patient Position: Sitting)  Comment (BP Patient Position): recliner    Pulse 87    Temp 99 °F (37.2 °C)    Resp 20    Ht 5' 8\" (1.727 m)    Wt 113.4 kg (250 lb)    SpO2 97%    BMI 38.01 kg/m2      Temp (24hrs), Av °F (37.2 °C), Min:98.5 °F (36.9 °C), Max:99.3 °F (37.4 °C)    Oxygen Therapy  O2 Sat (%): 97 % (18 1050)  Pulse via Oximetry: 85 beats per minute (18 0413)  O2 Device: Room air (18 0413)  O2 Flow Rate (L/min): 3 l/min (18 3327)    Intake/Output Summary (Last 24 hours) at 18 1327  Last data filed at 18 1052   Gross per 24 hour   Intake                0 ml   Output             2225 ml   Net            -2225 ml      General: No acute distress    Lungs:  CTA Bilaterally.    Heart:  Regular rate and rhythm,  No murmur, rub, or gallop  Abdomen: Soft, Non distended, Non tender, Positive bowel sounds  Extremities: Left knee wrapped this am  Neurologic:  No focal deficits    ASSESSMENT      Active Hospital Problems    Diagnosis Date Noted    Septic arthritis of knee, left ([facility] [address] 02/21/2018    MRSA bacteremia 02/21/2018    Left leg cellulitis 02/13/2018    Anemia 02/13/2018    Abnormal transaminases 02/13/2018    Leukocytosis 02/13/2018    Hypoalbuminemia 02/13/2018     Plan:  · Continue vancomycin. BC from 2/19 are negative. Plan for vanco through 3/20  · MRI knee being repeated by ortho  · Will need snf. SW following.    Pending precert  · Appreciate specialist input    DVT Prophylaxis: heparin     Signed By: Yani Willard MD     March 5, 2018

## 2018-03-05 NOTE — PROGRESS NOTES
Pt dsg taped and loose on all edges. Dante Baum RN cath lul pt as I came in.   Deferred dsg change to Dante Baum his primary RN

## 2018-03-06 LAB
CRP SERPL-MCNC: 4.1 MG/DL (ref 0–0.9)
ERYTHROCYTE [DISTWIDTH] IN BLOOD BY AUTOMATED COUNT: 17.5 % (ref 11.9–14.6)
ERYTHROCYTE [DISTWIDTH] IN BLOOD BY AUTOMATED COUNT: 17.6 % (ref 11.9–14.6)
ERYTHROCYTE [SEDIMENTATION RATE] IN BLOOD: 118 MM/HR (ref 0–20)
HCT VFR BLD AUTO: 29 % (ref 41.1–50.3)
HCT VFR BLD AUTO: 29.3 % (ref 41.1–50.3)
HGB BLD-MCNC: 9 G/DL (ref 13.6–17.2)
HGB BLD-MCNC: 9 G/DL (ref 13.6–17.2)
MCH RBC QN AUTO: 26.3 PG (ref 26.1–32.9)
MCH RBC QN AUTO: 26.5 PG (ref 26.1–32.9)
MCHC RBC AUTO-ENTMCNC: 30.7 G/DL (ref 31.4–35)
MCHC RBC AUTO-ENTMCNC: 31 G/DL (ref 31.4–35)
MCV RBC AUTO: 85.3 FL (ref 79.6–97.8)
MCV RBC AUTO: 85.7 FL (ref 79.6–97.8)
PLATELET # BLD AUTO: 463 K/UL (ref 150–450)
PLATELET # BLD AUTO: 497 K/UL (ref 150–450)
PMV BLD AUTO: 9.1 FL (ref 10.8–14.1)
PMV BLD AUTO: 9.4 FL (ref 10.8–14.1)
RBC # BLD AUTO: 3.4 M/UL (ref 4.23–5.67)
RBC # BLD AUTO: 3.42 M/UL (ref 4.23–5.67)
WBC # BLD AUTO: 9.1 K/UL (ref 4.3–11.1)
WBC # BLD AUTO: 9.6 K/UL (ref 4.3–11.1)

## 2018-03-06 PROCEDURE — 85652 RBC SED RATE AUTOMATED: CPT | Performed by: ORTHOPAEDIC SURGERY

## 2018-03-06 PROCEDURE — 36592 COLLECT BLOOD FROM PICC: CPT

## 2018-03-06 PROCEDURE — 65270000029 HC RM PRIVATE

## 2018-03-06 PROCEDURE — 74011250636 HC RX REV CODE- 250/636: Performed by: INTERNAL MEDICINE

## 2018-03-06 PROCEDURE — 86140 C-REACTIVE PROTEIN: CPT | Performed by: ORTHOPAEDIC SURGERY

## 2018-03-06 PROCEDURE — 74011250637 HC RX REV CODE- 250/637: Performed by: FAMILY MEDICINE

## 2018-03-06 PROCEDURE — 74011250636 HC RX REV CODE- 250/636: Performed by: NURSE PRACTITIONER

## 2018-03-06 PROCEDURE — 97530 THERAPEUTIC ACTIVITIES: CPT

## 2018-03-06 PROCEDURE — 97150 GROUP THERAPEUTIC PROCEDURES: CPT

## 2018-03-06 PROCEDURE — 85027 COMPLETE CBC AUTOMATED: CPT | Performed by: INTERNAL MEDICINE

## 2018-03-06 RX ADMIN — Medication 300 UNITS: at 21:39

## 2018-03-06 RX ADMIN — Medication 10 ML: at 05:49

## 2018-03-06 RX ADMIN — Medication 10 ML: at 15:22

## 2018-03-06 RX ADMIN — DOCUSATE SODIUM 100 MG: 100 CAPSULE, LIQUID FILLED ORAL at 09:38

## 2018-03-06 RX ADMIN — Medication 300 UNITS: at 15:22

## 2018-03-06 RX ADMIN — OXYCODONE HYDROCHLORIDE AND ACETAMINOPHEN 2 TABLET: 5; 325 TABLET ORAL at 09:38

## 2018-03-06 RX ADMIN — VANCOMYCIN HYDROCHLORIDE 1750 MG: 10 INJECTION, POWDER, LYOPHILIZED, FOR SOLUTION INTRAVENOUS at 17:40

## 2018-03-06 RX ADMIN — DOCUSATE SODIUM 100 MG: 100 CAPSULE, LIQUID FILLED ORAL at 17:08

## 2018-03-06 RX ADMIN — Medication 300 UNITS: at 05:49

## 2018-03-06 RX ADMIN — VANCOMYCIN HYDROCHLORIDE 1750 MG: 10 INJECTION, POWDER, LYOPHILIZED, FOR SOLUTION INTRAVENOUS at 05:48

## 2018-03-06 RX ADMIN — Medication 10 ML: at 21:39

## 2018-03-06 NOTE — PROGRESS NOTES
Problem: Self Care Deficits Care Plan (Adult)  Goal: *Acute Goals and Plan of Care (Insert Text)  1. Patient will complete functional transfers with supervision with WBAT status in LLE with knee imobilizer in place and with adaptive equipment as needed. 3. Patient will complete lower body bathing and dressing with minimal assistance and adaptive equipment as needed. 4. Patient will complete toileting and toilet transfer with supervision. (Goal Met)  5. Patient will tolerate 30 minutes of OT treatment with 2-3 rest breaks to increase activity tolerance for ADLs. 6. Patient will participate in at least 15 minutes of BUE therapeutic exercises to strengthen BUE for functional transfers. (Progressing)    Timeframe: 7 visits         OCCUPATIONAL THERAPY: Daily Note, Treatment Day: 4th and AM    3/6/2018  INPATIENT: Hospital Day: 14  Payor: BLUE CROSS / [place] [facility] / Product Type: PPO /      NAME/AGE/GENDER: Briana Seymour is a 57 y.o. male   PRIMARY DIAGNOSIS:  Open wound of left knee, initial encounter [S81.002A] Septic arthritis of knee, left (HCC) Septic arthritis of knee, left (HCC)  Procedure(s) (LRB):  IRRIGATION AND DEBRIDEMENT LEFT KNEE/ BEING ADMITTED FROM [place] [address] ON 2.20 (Left)  13 Days Post-Op  ICD-10: Treatment Diagnosis:    · Pain in Left Knee (M25.562)  · Stiffness of Left Knee, Not elsewhere classified (M25.662)  · Generalized Muscle Weakness (M62.81)  · Other lack of cordination (R27.8)  · History of falling (Z91.81)   Precautions/Allergies:     Review of patient's allergies indicates no known allergies. ASSESSMENT:   Mr. Uche Ledesma was admitted for the above diagnosis. 3/6/2018  Pt ambulated down to the gym with a rolling walker to participate in group exercises to increase strength for mobility and ADL's. Pt required visual, verbal, and manual cues to complete exercises with proper form. Pt ambulated back to his room independently with a rolling walker. Continue OT POC.      This section established at most recent assessment   PROBLEM LIST (Impairments causing functional limitations):  1. Decreased Strength  2. Decreased ADL/Functional Activities  3. Decreased Transfer Abilities  4. Decreased Ambulation Ability/Technique  5. Decreased Balance  6. Increased Pain  7. Decreased Activity Tolerance  8. Decreased Flexibility/Joint Mobility  9. Edema/Girth  10. Decreased Knowledge of Precautions  11. Decreased [place] with Home Exercise Program   INTERVENTIONS PLANNED: (Benefits and precautions of occupational therapy have been discussed with the patient.)  1. Activities of daily living training  2. Adaptive equipment training  3. Balance training  4. Clothing management  5. Donning&doffing training  6. Group therapy  7. Neuromuscular re-eduation  8. Therapeutic activity  9. Therapeutic exercise     TREATMENT PLAN: Frequency/Duration: Follow patient 3 times per week to address above goals. Rehabilitation Potential For Stated Goals: Excellent     RECOMMENDED REHABILITATION/EQUIPMENT: (at time of discharge pending progress): Due to the probability of continued deficits (see above) this patient will likely need continued skilled occupational therapy after discharge. Equipment:    TBD              OCCUPATIONAL PROFILE AND HISTORY:   History of Present Injury/Illness (Reason for Referral):  See H&P  Past Medical History/Comorbidities:   Mr. Juju Jernigan  has no past medical history on file. Mr. Juju Jernigan  has a past surgical history that includes hx orthopaedic. Social History/Living Environment:   Home Environment: Private residence  Wheelchair Ramp: Yes  One/Two Story Residence: One story  Living Alone: Yes  Support Systems: Family member(s)  Patient Expects to be Discharged to[de-identified] Private residence  Current DME Used/Available at Home: Cane, straight  Prior Level of Function/Work/Activity:  Pt reports that he lives in one story home with ramp to enter in [place].  Pt states he completes functional mobility/ADL with independence. Pt does have a cane and rolling walker. Pt state that his wife passed away but unclear if he lives alone or if he is remarried. Personal Factors:          Past/Current Experience:  Infection of knee with recent I &D        Overall Behavior:  Demanding; lacks some awareness of need for safety/assistance          Number of Personal Factors/Comorbidities that affect the Plan of Care: Expanded review of therapy/medical records (1-2):  MODERATE COMPLEXITY   ASSESSMENT OF OCCUPATIONAL PERFORMANCE[de-identified]   Activities of Daily Living:           Basic ADLs (From Assessment) Complex ADLs (From Assessment)   Basic ADL  Feeding: Independent  Oral Facial Hygiene/Grooming: Stand-by assistance  Bathing: Moderate assistance  Upper Body Dressing: Stand-by assistance  Lower Body Dressing: Maximum assistance  Toileting: Moderate assistance Instrumental ADL  Meal Preparation: Maximum assistance  Homemaking: Total assistance   Grooming/Bathing/Dressing Activities of Daily Living                                     Most Recent Physical Functioning:   Gross Assessment:                  Posture:  Posture (WDL): Exceptions to WDL  Posture Assessment: Forward head  Balance:    Bed Mobility:     Wheelchair Mobility:     Transfers:                   Patient Vitals for the past 6 hrs:   BP BP Patient Position SpO2 Pulse   03/06/18 0738 130/83 Sitting 95 % (!) 105       Mental Status  Neurologic State: Alert  Orientation Level: Oriented X4  Cognition: Appropriate decision making, Appropriate for age attention/concentration, Appropriate safety awareness  Perception: Appears intact  Perseveration: No perseveration noted  Safety/Judgement: Awareness of environment                          Physical Skills Involved:  1. Range of Motion  2. Balance  3. Strength  4. Activity Tolerance  5. Sensation  6. Pain (acute)  7.  Edema Cognitive Skills Affected (resulting in the inability to perform in a timely and safe manner):  1. [place] [facility] Psychosocial Skills Affected:  1. Habits/Routines  2. Social Interaction  3. Self-Awareness   Number of elements that affect the Plan of Care: 5+:  HIGH COMPLEXITY   CLINICAL DECISION MAKIN[address] [place] [address] AM-PAC 6 Clicks   Daily Activity Inpatient Short Form  How much help from another person does the patient currently need. .. Total A Lot A Little None   1. Putting on and taking off regular lower body clothing? [] 1   [x] 2   [] 3   [] 4   2. Bathing (including washing, rinsing, drying)? [] 1   [x] 2   [] 3   [] 4   3. Toileting, which includes using toilet, bedpan or urinal?   [] 1   [x] 2   [] 3   [] 4   4. Putting on and taking off regular upper body clothing? [] 1   [] 2   [x] 3   [] 4   5. Taking care of personal grooming such as brushing teeth? [] 1   [] 2   [x] 3   [] 4   6. Eating meals? [] 1   [] 2   [x] 3   [] 4   © 2007, Trustees of [address] [place] [address], under license to [employer]. All rights reserved      Score:  Initial: 15 Most Recent: X (Date: -- )    Interpretation of Tool:  Represents activities that are increasingly more difficult (i.e. Bed mobility, Transfers, Gait). Score 24 23 22-20 19-15 14-10 9-7 6     Modifier CH CI CJ CK CL CM CN      ? Self Care:     - CURRENT STATUS: CK - 40%-59% impaired, limited or restricted    - GOAL STATUS: CJ - 20%-39% impaired, limited or restricted    - D/C STATUS:  ---------------To be determined---------------  Payor: BLUE CR[place] / [place] [address] / Product Type: PPO /      Medical Necessity:     · Patient demonstrates excellent rehab potential due to higher previous functional level. Reason for Services/Other Comments:  · Patient continues to require skilled intervention due to decreased independence with ADL/functional transfers.    Use of outcome tool(s) and clinical judgement create a POC that gives a: LOW COMPLEXITY         TREATMENT:   (In addition to Assessment/Re-Assessment sessions the following treatments were rendered)     Pre-treatment Symptoms/Complaints:    Pain: Initial:   Pain Intensity 1: 0  Post Session:  3/10   Group Therapeutic Exercise: (10 minutes):  Exercises per grid below to improve mobility, strength and activity tolerance. Required minimal visual and verbal cues to promote proper body alignment and promote proper body mechanics. Progressed resistance and repetitions as indicated. Date:  3/5/18 Date:  3/6/18 Date:     Activity/Exercise Parameters Parameters Parameters   Shoulder flexion/extension 15 reps with a 5# dowel 25 reps with a 5# dowel    Shoulder horizontal add/abb 15 reps with a red theraband 25 reps with a red theraband    Punches 15 reps with a 5# dowel 25 reps with a 5# dowel    Elbow flexion/extension 15 reps with a 5# dowel 25 reps with a 5# dowel    Tricep extension 15 reps with a 5# dowel     Balloon tapping      Ball toss          Braces/Orthotics/Lines/Etc:   · IV  · O2 Device: Room air  Treatment/Session Assessment:    · Response to Treatment: Tolerated well   · Interdisciplinary Collaboration:   o Certified Occupational Therapy Assistant  o Registered Nurse  · After treatment position/precautions:   o ambulated back to room independently   · Compliance with Program/Exercises: Will assess as treatment progresses. · Recommendations/Intent for next treatment session: \"Next visit will focus on advancements to more challenging activities and reduction in assistance provided\".   Total Treatment Duration:  OT Patient Time In/Time Out  Time In: 1120  Time Out: [facility] [place] Meier

## 2018-03-06 NOTE — PROGRESS NOTES
Problem: Falls - Risk of  Goal: *Absence of Falls  Document Kinjal Fall Risk and appropriate interventions in the flowsheet.    Outcome: Progressing Towards Goal  Fall Risk Interventions:  Mobility Interventions: Communicate number of staff needed for ambulation/transfer, Utilize walker, cane, or other assitive device    Mentation Interventions: Adequate sleep, hydration, pain control, Bed/chair exit alarm, Door open when patient unattended, Evaluate medications/consider consulting pharmacy, Eyeglasses and hearing aids, Familiar objects from home, Family/sitter at bedside, Gait belt with transfers/ambulation, Increase mobility, More frequent rounding, Reorient patient, Room close to nurse's station, Self-releasing belt, Toileting rounds, Update white board    Medication Interventions: Assess postural VS orthostatic hypotension, Teach patient to arise slowly    Elimination Interventions: Call light in reach, Toileting schedule/hourly rounds

## 2018-03-06 NOTE — PROGRESS NOTES
Spoke with  Bakari Mendez about not being able to print labels from clinical collect at this time; Bakari Mendez stated that a chart label with suffice.

## 2018-03-06 NOTE — PROGRESS NOTES
Infectious Disease Progress Note    Today's Date: 3/6/2018   Admit Date: 2018    Impression:   · MRSA bacteremia ( - ); repeat cultures  NG; TTE  negative for vegetation, MANJINDER NG  · MRSA pyomyositis left leg  · MRSA left knee septic arthritis, aspirated 18, s/p I&D   · Recent lumbar spine surgery with hardware, 10/2017 (in [place])  · Microcytic anemia with elevated ferritin - likely chronic disease anemia or inflammatory anemia s/p GI eval  · Thrombocytosis    Plan:   [facility] is planned when ready for discharge  Ortho is planning another knee wash-out tomorrow   ID Recommendations for plan of care - ID cannot follow the patient at [facility]. Routine PICC care  Continue vancomycin through 3/20/18, current dose 1500 Q12 hrs, adjust dose to keep vancomycin trough 15-20. Q Monday labs:   CBC with diff, serum creatinine, vancomycin trough, ESR, non-cardiac inflammatory CRP  Q Thursday labs:   Serum creatinine and vancomycin trough  Follow up with ID 3/20/18 at 0840    Anti-infectives:   IV vancomycin    Subjective:      Denies nausea, vomiting, diarrhea, or sweats. Denies difficulty breathing. Left knee is feeling improved. No Known Allergies     Review of Systems:  Pertinent items are noted in the History of Present Illness.     Objective:     Visit Vitals    /82 (BP 1 Location: Left arm, BP Patient Position: Sitting)    Pulse 95    Temp 98.2 °F (36.8 °C)    Resp 18    Ht 5' 8\" (1.727 m)    Wt 113.4 kg (250 lb)    SpO2 95%    BMI 38.01 kg/m2     Temp (24hrs), Av.6 °F (37 °C), Min:98 °F (36.7 °C), Max:99 °F (37.2 °C)       Lines:   RUE PICC intact        Physical Exam:    General:  Alert, cooperative, obese   Eyes:  Sclera anicteric   Mouth/Throat: Dentition in good repair, moist mucosa, no erythema or exudates   Neck: Supple   Lungs:   Clear lung fields in anterior position, no increased work of breathing   CV:  Regular rate and rhythm without audible murmur   Abdomen: Soft, non tender, obese, active bowel sounds   Extremities: RLE 1+ edema   Skin: No rashes/hives   Musculoskeletal: L lateral thigh/knee incision, healing, staples removed this morning, no erythema, no active drainage, significant swelling at the knee, very tender   Lines/Devices:  Intact, no erythema, drainage or tenderness   Psych: Alert, oriented x3     Data Review:     CBC:  Recent Labs      18   0951  18   0614   WBC  9.1  9.6   HGB  9.0*  9.0*   HCT  29.0*  29.3*   PLT  463*  497*       BMP:  No results for input(s): CREA, BUN, NA, K, CL, CO2, AGAP, GLU in the last 72 hours. LFTS:  No results for input(s): TBILI, ALT, SGOT, AP, TP, ALB in the last 72 hours. Microbiology:     All Micro Results     None          Imagin/15/18 L knee film: IMPRESSION:  1. Large joint effusion and soft tissue edema without acute osseous abnormality. An infectious/inflammatory process of the left knee joint cannot be excluded  given the appearance. Recommend clinical correlation. 18 MRI left leg  IMPRESSION:  1. Multiloculated fluid collections within the left lower extremity most compatible with abscesses. A large joint effusion is present with enhancement as well as enhancement within the joint space therefore concerning for septic arthritis. There are no definite findings of osteomyelitis. 2. Large right joint effusion.     Signed By: Tru Denise NP     2018

## 2018-03-06 NOTE — PROGRESS NOTES
Problem: Falls - Risk of  Goal: *Absence of Falls  Document Kinjal Fall Risk and appropriate interventions in the flowsheet.    Outcome: Progressing Towards Goal  Fall Risk Interventions:  Mobility Interventions: Bed/chair exit alarm, Communicate number of staff needed for ambulation/transfer, OT consult for ADLs, PT Consult for mobility concerns, Patient to call before getting OOB, PT Consult for assist device competence, Strengthening exercises (ROM-active/passive), Utilize walker, cane, or other assitive device, Utilize gait belt for transfers/ambulation    Mentation Interventions: Adequate sleep, hydration, pain control, Bed/chair exit alarm, Door open when patient unattended, Evaluate medications/consider consulting pharmacy, Eyeglasses and hearing aids, Familiar objects from home, Family/sitter at bedside, Gait belt with transfers/ambulation, Increase mobility, More frequent rounding, Reorient patient, Room close to nurse's station, Self-releasing belt, Toileting rounds, Update white board    Medication Interventions: Assess postural VS orthostatic hypotension, Bed/chair exit alarm, Evaluate medications/consider consulting pharmacy, Patient to call before getting OOB, Teach patient to arise slowly, Utilize gait belt for transfers/ambulation    Elimination Interventions: Call light in reach

## 2018-03-06 NOTE — PROGRESS NOTES
Hospitalist Progress Note    3/6/2018  Admit Date: 2018  9:12 AM   NAME: Mitzi Canavan   :  1955   MRN:  [id number]   Attending: Vineet Dsouza. Brittny Liu MD  PCP:  None    SUBJECTIVE:   Patient presented with leg pain and swelling. Was found to have persistent Blood cultures positive for MRSA bacteremia. ECHO negative for vegetations. Due to poor improvement of his thigh and leg edema and pain he underwent an MRI of his leg showing multiloculated abscesses and findings of septic knee. He underwent I&D and knee lavage on 18 by general surgery. Orthopedics performed knee aspiration. So far, all BC and wound aspirate and cultures are positive for staph aureus. ID consulted and have continued vancomycin through 3/20.      3/6:  Has precert. Ortho considering washout of knee      Review of Systems negative with exception of pertinent positives noted above  PHYSICAL EXAM     Visit Vitals    /83 (BP 1 Location: Left arm, BP Patient Position: Sitting)    Pulse (!) 105    Temp 98.8 °F (37.1 °C)    Resp 18    Ht 5' 8\" (1.727 m)    Wt 113.4 kg (250 lb)    SpO2 95%    BMI 38.01 kg/m2      Temp (24hrs), Av.7 °F (37.1 °C), Min:98 °F (36.7 °C), Max:99 °F (37.2 °C)    Oxygen Therapy  O2 Sat (%): 95 % (18 0738)  Pulse via Oximetry: 85 beats per minute (18 0413)  O2 Device: Room air (18 0413)  O2 Flow Rate (L/min): 3 l/min (18 5937)    Intake/Output Summary (Last 24 hours) at 18 0849  Last data filed at 18 1838   Gross per 24 hour   Intake              480 ml   Output                0 ml   Net              480 ml      General: No acute distress    Lungs:  CTA Bilaterally.    Heart:  Regular rate and rhythm,  No murmur, rub, or gallop  Abdomen: Soft, Non distended, Non tender, Positive bowel sounds  Extremities: Left knee wrapped this am  Neurologic:  No focal deficits    ASSESSMENT      Active Hospital Problems    Diagnosis Date Noted    Septic arthritis of knee, left ([facility] [address] 02/21/2018    MRSA bacteremia 02/21/2018    Left leg cellulitis 02/13/2018    Anemia 02/13/2018    Abnormal transaminases 02/13/2018    Leukocytosis 02/13/2018    Hypoalbuminemia 02/13/2018     Plan:  · Continue vancomycin. BC from 2/19 are negative. Plan for vanco through 3/20  · Ortho to evaluate for further possible washout of knee  · Will need snf. SW following.    Has precert  · Appreciate specialist input    DVT Prophylaxis: heparin     Signed By: Joy Cesar MD     March 6, 2018

## 2018-03-06 NOTE — PROGRESS NOTES
Received auth from [facility] for admission today, per MD pt DC on hold until possible tomorrow 3/7/18 d/t may need additional surgery on knee.

## 2018-03-06 NOTE — PROGRESS NOTES
Problem: Mobility Impaired (Adult and Pediatric)  Goal: *Acute Goals and Plan of Care (Insert Text)  UPDATED 3/5/18  1. Mr. Bhakti Chandra will perform transfers with INDEPENDENCE within 7 treatment day(s). 2. Mr. Bhakti Chandra will ambulate x500ft with RW and MODIFIED INDEPENDENCE within 7 treatment day(s). 3. Mr. Bhakti Chandra will demonstrate >90 degrees of L knee flexion within 7 treatment day(s). 4. Mr. Bhakti Chandra will demonstrate <-5 decrees of L knee extension within 7 treatment day(s). 5. Mr. Bhakti Chandra will tolerate 25+ minutes of standing therapeutic activity/exercises with a <2point increase in pain from rest within 7 treatment day(s). Previous Goals:      _______________________________________________________________________________________________  PHYSICAL THERAPY: Daily Note, Treatment Day: 1st, AM 3/6/2018  INPATIENT: Hospital Day: 14  Payor: Jaylen Serrato / Plan: [facility] / Product Type: PPO /      WBAT L LE     NAME/AGE/GENDER: Alison Montague is a 57 y.o. male   PRIMARY DIAGNOSIS: Open wound of left knee, initial encounter [S81.002A] Septic arthritis of knee, left (HCC) Septic arthritis of knee, left (HCC)  Procedure(s) (LRB):  IRRIGATION AND DEBRIDEMENT LEFT KNEE/ BEING ADMITTED FROM [place] [id number] ON 2.20 (Left)  13 Days Post-Op  ICD-10: Treatment Diagnosis:   · Generalized Muscle Weakness (M62.81)  · Difficulty in walking, Not elsewhere classified (R26.2)   Precaution/Allergies:  Review of patient's allergies indicates no known allergies. ASSESSMENT:     Mr. Bhakti Chandra is a 57year old male admitted with left knee infection s/p L knee irigation and debridement. Patient is WBAT. Patient was sitting up in straight back chair upon contact and agreeable to PT. Patient able to transfer to standing with mod I and ambulate 250' with use of rolling walker, supervision and occasional cues for sequencing with rolling walker.  Patient participates in group therapeutic strengthening/ROM exercises to improve functional strength for transfers, gait and overall mobility. Patient requires cues to perform exercises correctly. Left knee ROM not measured due to ace wrap dressing. Patient would benefit from continued skilled PT post discharge and prior to returning home as he is well below baseline. Overall steady progress towards physical therapy goals. No goals have been met thus far. Will continue efforts. This section established at most recent assessment   PROBLEM LIST (Impairments causing functional limitations):  1. Decreased Strength  2. Decreased ADL/Functional Activities  3. Decreased Transfer Abilities  4. Decreased Ambulation Ability/Technique  5. Decreased Balance  6. Increased Pain  7. Decreased Activity Tolerance  8. Decreased Flexibility/Joint Mobility  9. Decreased Knowledge of Precautions  10. Decreased [place] with Home Exercise Program   INTERVENTIONS PLANNED: (Benefits and precautions of physical therapy have been discussed with the patient.)  1. Balance Exercise  2. Bed Mobility  3. Family Education  4. Gait Training  5. Range of Motion (ROM)  6. Therapeutic Activites  7. Transfer Training  8. Group Therapy     TREATMENT PLAN: Frequency/Duration: 3 times a week for duration of hospital stay  Rehabilitation Potential For Stated Goals: GOOD     RECOMMENDED REHABILITATION/EQUIPMENT: (at time of discharge pending progress): Due to the probability of continued deficits (see above) this patient will likely need continued skilled physical therapy after discharge. Equipment:    Walkers, Type: Rolling Walker              HISTORY:   History of Present Injury/Illness (Reason for Referral):  S/p left knee I&D  Past Medical History/Comorbidities:   Mr. Yamini Valdez  has no past medical history on file. Mr. Yamini Valdez  has a past surgical history that includes hx orthopaedic.   Social History/Living Environment:   Home Environment: Private residence  Wheelchair Ramp: Yes  One/Two Story Residence: One story  Living Alone: Yes  Support Systems: Family member(s)  Patient Expects to be Discharged to[de-identified] Private residence  Current DME Used/Available at Home: Cane, straight  Prior Level of Function/Work/Activity:   Patient lives alone in a single story residence with 0 steps to enter. At baseline, patient is independent with ADLs, ambulates without utilizing an assistive device, and endorses 0 recent falls. Patient with difficult history to fall regarding falls and L LE pain. Number of Personal Factors/Comorbidities that affect the Plan of Care: 1-2: MODERATE COMPLEXITY   EXAMINATION:   Most Recent Physical Functioning:   Gross Assessment:                  Posture:     Balance:  Sitting: Intact  Standing: Impaired  Standing - Static: Good  Standing - Dynamic : Fair Bed Mobility:     Wheelchair Mobility:     Transfers:  Sit to Stand: Modified independent  Stand to Sit: Modified independent  Gait:     Speed/Rosalie: Slow  Step Length: Right shortened  Gait Abnormalities: Decreased step clearance;Trunk sway increased  Distance (ft): 250 Feet (ft)  Assistive Device: Walker, rolling  Ambulation - Level of Assistance: Supervision;Stand-by assistance  Interventions: Safety awareness training;Verbal cues      Body Structures Involved:  1. Bones  2. Joints  3. Muscles Body Functions Affected:  1. Neuromusculoskeletal  2. Movement Related Activities and Participation Affected:  1. Mobility  2. Self Care   Number of elements that affect the Plan of Care: 4+: HIGH COMPLEXITY   CLINICAL PRESENTATION:   Presentation: Stable and uncomplicated: LOW COMPLEXITY   CLINICAL DECISION MAKIN [facility] [address] [id number] AM-PAC 6 Clicks   Basic Mobility Inpatient Short Form  How much difficulty does the patient currently have. .. Unable A Lot A Little None   1. Turning over in bed (including adjusting bedclothes, sheets and blankets)? [] 1   [] 2   [] 3   [x] 4   2.   Sitting down on and standing up from a chair with arms ( e.g., wheelchair, bedside commode, etc.)   [] 1   [] 2   [] 3   [x] 4   3. Moving from lying on back to sitting on the side of the bed? [] 1   [] 2   [x] 3   [] 4   How much help from another person does the patient currently need. .. Total A Lot A Little None   4. Moving to and from a bed to a chair (including a wheelchair)? [] 1   [] 2   [x] 3   [] 4   5. Need to walk in hospital room? [] 1   [] 2   [x] 3   [] 4   6. Climbing 3-5 steps with a railing? [] 1   [] 2   [x] 3   [] 4   © 2007, Trustees of [address] [place] [address] [address] [id number], under license to [employer]. All rights reserved      Score:  Initial: 18 Most Recent: 20 (Date: 3/5/18 )    Interpretation of Tool:  Represents activities that are increasingly more difficult (i.e. Bed mobility, Transfers, Gait). Score 24 23 22-20 19-15 14-10 9-7 6     Modifier CH CI CJ CK CL CM CN      ? Mobility - Walking and Moving Around:     - CURRENT STATUS: CJ - 20%-39% impaired, limited or restricted    - GOAL STATUS: CI - 1%-19% impaired, limited or restricted    - D/C STATUS:  ---------------To be determined---------------  Payor: BLUE CROSS / Plan: [place] [employer] [address] / Product Type: PPO /      Medical Necessity:     · Patient demonstrates good rehab potential due to higher previous functional level. Reason for Services/Other Comments:  · Patient continues to require skilled intervention due to decreased functional mobility and balance/gait status from baseline. .   Use of outcome tool(s) and clinical judgement create a POC that gives a: Clear prediction of patient's progress: LOW COMPLEXITY            TREATMENT:   (In addition to Assessment/Re-Assessment sessions the following treatments were rendered)   Pre-treatment Symptoms/Complaints:  No complaints  Pain: Initial:   Pain Intensity 1: 0  Post Session:  0/10     Therapeutic Activity: (    10 Minutes):   Therapeutic activities including transfer training, ambulation on level ground, static/dynamic standing balance training, instruction in sequencing with rolling walker, and patient education to improve mobility, strength and balance. Required moderate Safety awareness training;Verbal cues to promote static and dynamic balance in standing and promote coordination of bilateral, lower extremity(s). Group Therapeutic Exercise: (  10 Minutes):  Exercises per grid below to improve mobility, strength, balance and stiffness/soreness. Required minimal visual, verbal and tactile cues to promote proper body alignment, promote proper body posture and promote proper body mechanics. Progressed range, repetitions and complexity of movement as indicated. Date:  3/6/18 Date:   Date:     ACTIVITY/EXERCISE AM PM AM PM AM PM   Ambulation:           Distance  Device  Duration         Seated Heel Raises x20B A        Seated Toe Raises x20B A        Seated Long Arc Quads x20B A        Seated Marching x20B A        Seated Hip Abduction x20B A        Seated heel slides x10L A        B = bilateral; AA = active assistive; A = active; P = passive        Braces/Orthotics/Lines/Etc:   · None  Treatment/Session Assessment:    · Response to Treatment:  See above  · Interdisciplinary Collaboration:   o Physical Therapy Assistant  o Registered Nurse  · After treatment position/precautions:   o Up in chair  o Bed/Chair-wheels locked  o Call light within reach  o RN notified   · Compliance with Program/Exercises: compliant encourage elevation  of LLE  · Recommendations/Intent for next treatment session: \"Next visit will focus on advancements to more challenging activities and reduction in assistance provided\".     Total Treatment Duration:  PT Patient Time In/Time Out  Time In: 1100 (1130)  Time Out: 1110 (1140)    Karen Mercer, PTA

## 2018-03-07 VITALS
TEMPERATURE: 98 F | OXYGEN SATURATION: 98 % | DIASTOLIC BLOOD PRESSURE: 96 MMHG | BODY MASS INDEX: 37.89 KG/M2 | WEIGHT: 250 LBS | HEIGHT: 68 IN | SYSTOLIC BLOOD PRESSURE: 139 MMHG | HEART RATE: 101 BPM | RESPIRATION RATE: 19 BRPM

## 2018-03-07 PROCEDURE — 74011250637 HC RX REV CODE- 250/637: Performed by: FAMILY MEDICINE

## 2018-03-07 PROCEDURE — 74011250636 HC RX REV CODE- 250/636: Performed by: NURSE PRACTITIONER

## 2018-03-07 PROCEDURE — 74011250636 HC RX REV CODE- 250/636: Performed by: INTERNAL MEDICINE

## 2018-03-07 RX ADMIN — VANCOMYCIN HYDROCHLORIDE 1750 MG: 10 INJECTION, POWDER, LYOPHILIZED, FOR SOLUTION INTRAVENOUS at 06:05

## 2018-03-07 RX ADMIN — Medication 300 UNITS: at 06:05

## 2018-03-07 RX ADMIN — Medication 10 ML: at 06:05

## 2018-03-07 RX ADMIN — DOCUSATE SODIUM 100 MG: 100 CAPSULE, LIQUID FILLED ORAL at 08:17

## 2018-03-07 NOTE — PROGRESS NOTES
ORTHO:    PATIENT IS OK FOR DC FORM ORTHO STANDPOINT    WILL SEE PATIENT IN OFFICE ON FRIDAY TO WOUND CHECK

## 2018-03-07 NOTE — PROGRESS NOTES
Patient scheduled for transport to [facility] today at 10:30am via stretcher by [facility] ambulance. Patient notified. Care Management Interventions  PCP Verified by CM:  Yes  Mode of Transport at Discharge: BLS  Transition of Care Consult (CM Consult): Discharge Planning  Physical Therapy Consult: Yes  Occupational Therapy Consult: Yes  Current Support Network: Own Home  Confirm Follow Up Transport: Family  Plan discussed with Pt/Family/Caregiver: Yes  Freedom of Choice Offered: Yes  Discharge Location  Discharge Placement: Rehab Unit Subacute ([facility])

## 2018-03-07 NOTE — PROGRESS NOTES
TRANSFER - OUT REPORT:    Verbal report given to Albert HILL(name) on  Auto  being transferred to [place](unit) for routine progression of care       Report consisted of patients Situation, Background, Assessment and   Recommendations(SBAR). Information from the following report(s) SBAR, Kardex, Procedure Summary, Intake/Output, MAR and Med Rec Status was reviewed with the receiving nurse. Lines:   PICC Single Lumen 50/80/08 Right;Basilic (Active)   Central Line Being Utilized Yes 3/7/2018  8:13 AM   Criteria for Appropriate Use Long term IV/antibiotic administration 3/7/2018  8:13 AM   Site Assessment Clean, dry, & intact 3/7/2018  8:13 AM   Phlebitis Assessment 0 3/7/2018  8:13 AM   Infiltration Assessment 0 3/7/2018  8:13 AM   Arm Circumference (cm) 34 cm 2/23/2018  3:20 PM   Date of Last Dressing Change 03/05/18 3/7/2018  8:13 AM   Dressing Status Clean, dry, & intact 3/7/2018  8:13 AM   External Catheter Length (cm) 0 centimeters 3/5/2018  2:50 PM   Dressing Type Disk with Chlorhexadine gluconate (CHG); Transparent 3/7/2018  8:13 AM   Hub Color/Line Status Patent;Capped 3/6/2018  9:39 AM   Action Taken Dressing changed 3/5/2018  2:50 PM   Positive Blood Return (Site #1) Yes 3/6/2018  9:39 AM   Alcohol Cap Used No 3/6/2018  9:39 AM        Opportunity for questions and clarification was provided.       Patient transported with:   [employer]

## 2018-03-13 ENCOUNTER — HOSPITAL ENCOUNTER (OUTPATIENT)
Dept: LAB | Age: 63
Discharge: HOME | End: 2018-03-13

## 2018-03-13 LAB
BASOPHILS # BLD: 0 K/UL (ref 0–0.2)
BASOPHILS NFR BLD: 0 % (ref 0–2)
CREAT SERPL-MCNC: 1.04 MG/DL (ref 0.8–1.5)
CRP SERPL-MCNC: 1.1 MG/DL (ref 0–0.9)
DIFFERENTIAL METHOD BLD: ABNORMAL
EOSINOPHIL # BLD: 0.3 K/UL (ref 0–0.8)
EOSINOPHIL NFR BLD: 4 % (ref 0.5–7.8)
ERYTHROCYTE [DISTWIDTH] IN BLOOD BY AUTOMATED COUNT: 16.7 % (ref 11.9–14.6)
ERYTHROCYTE [SEDIMENTATION RATE] IN BLOOD: 107 MM/HR (ref 0–20)
HCT VFR BLD AUTO: 29.7 % (ref 41.1–50.3)
HGB BLD-MCNC: 9.4 G/DL (ref 13.6–17.2)
IMM GRANULOCYTES # BLD: 0 K/UL (ref 0–0.5)
IMM GRANULOCYTES NFR BLD AUTO: 0 % (ref 0–5)
LYMPHOCYTES # BLD: 1.7 K/UL (ref 0.5–4.6)
LYMPHOCYTES NFR BLD: 24 % (ref 13–44)
MCH RBC QN AUTO: 27.1 PG (ref 26.1–32.9)
MCHC RBC AUTO-ENTMCNC: 31.6 G/DL (ref 31.4–35)
MCV RBC AUTO: 85.6 FL (ref 79.6–97.8)
MONOCYTES # BLD: 0.5 K/UL (ref 0.1–1.3)
MONOCYTES NFR BLD: 7 % (ref 4–12)
NEUTS SEG # BLD: 4.6 K/UL (ref 1.7–8.2)
NEUTS SEG NFR BLD: 65 % (ref 43–78)
PLATELET # BLD AUTO: 426 K/UL (ref 150–450)
PMV BLD AUTO: 10.2 FL (ref 10.8–14.1)
RBC # BLD AUTO: 3.47 M/UL (ref 4.23–5.67)
VANCOMYCIN TROUGH SERPL-MCNC: 19.3 UG/ML (ref 5–20)
WBC # BLD AUTO: 7.1 K/UL (ref 4.3–11.1)

## 2018-03-13 PROCEDURE — 85025 COMPLETE CBC W/AUTO DIFF WBC: CPT

## 2018-03-13 PROCEDURE — 85652 RBC SED RATE AUTOMATED: CPT

## 2018-03-13 PROCEDURE — 80202 ASSAY OF VANCOMYCIN: CPT

## 2018-03-13 PROCEDURE — 82565 ASSAY OF CREATININE: CPT

## 2018-03-13 PROCEDURE — 86140 C-REACTIVE PROTEIN: CPT

## 2018-11-11 NOTE — ROUTINE PROCESS
Patient leaving now per [place] on stretcher to [facility].
Pharmacist AJ notified of critical vanc trough of 26. Verbal order to hold 1700 vanc dose.
TRANSFER - IN REPORT:    Verbal report received from Yuliya Rodgers (name) on Christy Wang  being received from [facility]) for routine post - op      Report consisted of patients Situation, Background, Assessment and   Recommendations(SBAR). Information from the following report(s) SBAR was reviewed with the receiving nurse. Opportunity for questions and clarification was provided. Assessment completed upon patients arrival to unit and care assumed.
Statement Selected

## 2024-05-23 ENCOUNTER — LAB OUTSIDE AN ENCOUNTER (OUTPATIENT)
Dept: URBAN - METROPOLITAN AREA CLINIC 113 | Facility: CLINIC | Age: 69
End: 2024-05-23

## 2024-05-23 ENCOUNTER — OFFICE VISIT (OUTPATIENT)
Dept: URBAN - METROPOLITAN AREA CLINIC 113 | Facility: CLINIC | Age: 69
End: 2024-05-23
Payer: MEDICARE

## 2024-05-23 ENCOUNTER — DASHBOARD ENCOUNTERS (OUTPATIENT)
Age: 69
End: 2024-05-23

## 2024-05-23 VITALS
HEIGHT: 68 IN | WEIGHT: 205 LBS | HEART RATE: 65 BPM | BODY MASS INDEX: 31.07 KG/M2 | TEMPERATURE: 98.1 F | DIASTOLIC BLOOD PRESSURE: 77 MMHG | RESPIRATION RATE: 20 BRPM | SYSTOLIC BLOOD PRESSURE: 140 MMHG

## 2024-05-23 DIAGNOSIS — Z86.010 HISTORY OF COLON POLYPS: ICD-10-CM

## 2024-05-23 PROBLEM — 428283002: Status: ACTIVE | Noted: 2024-05-23

## 2024-05-23 PROCEDURE — 996 AG2 (NON BILLABLE): Performed by: NURSE PRACTITIONER

## 2024-05-23 RX ORDER — TAMSULOSIN HYDROCHLORIDE 0.4 MG/1
1 CAPSULE CAPSULE ORAL ONCE A DAY
Status: ACTIVE | COMMUNITY

## 2024-05-23 RX ORDER — SILDENAFIL 100 MG/1
1 TABLET AS NEEDED TABLET, FILM COATED ORAL ONCE A DAY
Status: ACTIVE | COMMUNITY

## 2024-05-23 RX ORDER — ATORVASTATIN CALCIUM 10 MG/1
1 TABLET TABLET, FILM COATED ORAL ONCE A DAY
Status: ACTIVE | COMMUNITY

## 2024-06-13 ENCOUNTER — CLAIMS CREATED FROM THE CLAIM WINDOW (OUTPATIENT)
Dept: URBAN - METROPOLITAN AREA SURGERY CENTER 25 | Facility: SURGERY CENTER | Age: 69
End: 2024-06-13
Payer: MEDICARE

## 2024-06-13 ENCOUNTER — CLAIMS CREATED FROM THE CLAIM WINDOW (OUTPATIENT)
Dept: URBAN - METROPOLITAN AREA CLINIC 4 | Facility: CLINIC | Age: 69
End: 2024-06-13
Payer: MEDICARE

## 2024-06-13 ENCOUNTER — OUT OF OFFICE VISIT (OUTPATIENT)
Dept: URBAN - METROPOLITAN AREA SURGERY CENTER 25 | Facility: SURGERY CENTER | Age: 69
End: 2024-06-13

## 2024-06-13 DIAGNOSIS — K63.89 OTHER SPECIFIED DISEASES OF INTESTINE: ICD-10-CM

## 2024-06-13 DIAGNOSIS — K63.89 BACTERIAL OVERGROWTH SYNDROME: ICD-10-CM

## 2024-06-13 DIAGNOSIS — Z12.11 COLON CANCER SCREENING: ICD-10-CM

## 2024-06-13 PROCEDURE — 88305 TISSUE EXAM BY PATHOLOGIST: CPT | Performed by: PATHOLOGY

## 2024-06-13 PROCEDURE — 45380 COLONOSCOPY AND BIOPSY: CPT | Performed by: INTERNAL MEDICINE

## 2024-06-13 PROCEDURE — 45380 COLONOSCOPY AND BIOPSY: CPT | Performed by: CLINIC/CENTER

## 2024-06-13 RX ORDER — TAMSULOSIN HYDROCHLORIDE 0.4 MG/1
1 CAPSULE CAPSULE ORAL ONCE A DAY
Status: ACTIVE | COMMUNITY

## 2024-06-13 RX ORDER — ATORVASTATIN CALCIUM 10 MG/1
1 TABLET TABLET, FILM COATED ORAL ONCE A DAY
Status: ACTIVE | COMMUNITY

## 2024-06-13 RX ORDER — SILDENAFIL 100 MG/1
1 TABLET AS NEEDED TABLET, FILM COATED ORAL ONCE A DAY
Status: ACTIVE | COMMUNITY

## (undated) DEVICE — BANDAGE COMPR SELF ADH 5 YDX4 IN TAN STRL PREMIERPRO LF

## (undated) DEVICE — INTENDED FOR TISSUE SEPARATION, AND OTHER PROCEDURES THAT REQUIRE A SHARP SURGICAL BLADE TO PUNCTURE OR CUT.: Brand: BARD-PARKER ® STAINLESS STEEL BLADES

## (undated) DEVICE — X-LARGE COTTON GLOVE: Brand: DEROYAL

## (undated) DEVICE — COVER LT HNDL FOR OR SURG LAMP

## (undated) DEVICE — DRAPE TWL SURG 16X26IN BLU ORB04] ALLCARE INC]

## (undated) DEVICE — Device

## (undated) DEVICE — 2000CC GUARDIAN II: Brand: GUARDIAN

## (undated) DEVICE — (D)PREP SKN CHLRAPRP APPL 26ML -- CONVERT TO ITEM 371833

## (undated) DEVICE — FAN SPRAY KIT: Brand: PULSAVAC®

## (undated) DEVICE — AMD ANTIMICROBIAL GAUZE SPONGES,12 PLY USP TYPE VII, 0.2% POLYHEXAMETHYLENE BIGUANIDE HCI (PHMB): Brand: CURITY

## (undated) DEVICE — SYSTEM CULT COLL OR TRNSPRT CLR DBL SWAB W/ MOD AERB AMIES

## (undated) DEVICE — GAUZE,PACKING STRIP,IODOFORM,1/2"X5YD,ST: Brand: CURAD

## (undated) DEVICE — SUTURE ETHLN SZ 2-0 L18IN NONABSORBABLE BLK L26MM PS 3/8 585H

## (undated) DEVICE — STOCKINETTE IMPERV 12X48IN STE -- MEDICHOICE

## (undated) DEVICE — SOLUTION IV 1000ML 0.9% SOD CHL

## (undated) DEVICE — REM POLYHESIVE ADULT PATIENT RETURN ELECTRODE: Brand: VALLEYLAB

## (undated) DEVICE — GUARDIAN LVC: Brand: GUARDIAN

## (undated) DEVICE — AMD ANTIMICROBIAL BANDAGE ROLL,6 PLY: Brand: KERLIX

## (undated) DEVICE — TRAY PREP DRY W/ PREM GLV 2 APPL 6 SPNG 2 UNDPD 1 OVERWRAP

## (undated) DEVICE — SUTURE PDS II SZ 1 L96IN ABSRB VLT TP-1 L65MM 1/2 CIR Z880G

## (undated) DEVICE — STERILE LATEX POWDER-FREE SURGICAL GLOVESWITH NITRILE COATING: Brand: PROTEXIS

## (undated) DEVICE — SOLUTION IRRIG 3000ML 0.9% SOD CHL FLX CONT 0797208] ICU MEDICAL INC]

## (undated) DEVICE — SPONGE LAP 18X18IN STRL -- 5/PK

## (undated) DEVICE — MEDI-VAC YANKAUER SUCTION HANDLE W/BULBOUS TIP: Brand: CARDINAL HEALTH

## (undated) DEVICE — DRAIN KT WND 10FR RND 400ML --

## (undated) DEVICE — BUTTON SWITCH PENCIL BLADE ELECTRODE, HOLSTER: Brand: EDGE

## (undated) DEVICE — SUTURE VCRL SZ 2-0 L27IN ABSRB UD L26MM SH 1/2 CIR J417H